# Patient Record
Sex: MALE | Race: WHITE | NOT HISPANIC OR LATINO | Employment: PART TIME | URBAN - METROPOLITAN AREA
[De-identification: names, ages, dates, MRNs, and addresses within clinical notes are randomized per-mention and may not be internally consistent; named-entity substitution may affect disease eponyms.]

---

## 2017-07-07 ENCOUNTER — OFFICE VISIT (OUTPATIENT)
Dept: PEDIATRICS | Facility: CLINIC | Age: 62
End: 2017-07-07
Payer: COMMERCIAL

## 2017-07-07 VITALS
HEIGHT: 66 IN | HEART RATE: 95 BPM | TEMPERATURE: 98.4 F | OXYGEN SATURATION: 95 % | DIASTOLIC BLOOD PRESSURE: 88 MMHG | BODY MASS INDEX: 33.91 KG/M2 | SYSTOLIC BLOOD PRESSURE: 136 MMHG | WEIGHT: 211 LBS

## 2017-07-07 DIAGNOSIS — M54.2 CHRONIC NECK PAIN: ICD-10-CM

## 2017-07-07 DIAGNOSIS — G89.29 CHRONIC NECK PAIN: ICD-10-CM

## 2017-07-07 DIAGNOSIS — I10 HTN, GOAL BELOW 140/90: ICD-10-CM

## 2017-07-07 DIAGNOSIS — Z00.00 ROUTINE GENERAL MEDICAL EXAMINATION AT A HEALTH CARE FACILITY: Primary | ICD-10-CM

## 2017-07-07 DIAGNOSIS — G44.049 CHRONIC PAROXYSMAL HEMICRANIA, NOT INTRACTABLE: ICD-10-CM

## 2017-07-07 DIAGNOSIS — R97.20 ELEVATED PROSTATE SPECIFIC ANTIGEN (PSA): ICD-10-CM

## 2017-07-07 LAB
BASOPHILS # BLD AUTO: 0 10E9/L (ref 0–0.2)
BASOPHILS NFR BLD AUTO: 0.3 %
DIFFERENTIAL METHOD BLD: NORMAL
EOSINOPHIL # BLD AUTO: 0.2 10E9/L (ref 0–0.7)
EOSINOPHIL NFR BLD AUTO: 2.3 %
ERYTHROCYTE [DISTWIDTH] IN BLOOD BY AUTOMATED COUNT: 12.6 % (ref 10–15)
HCT VFR BLD AUTO: 45.8 % (ref 40–53)
HGB BLD-MCNC: 15.4 G/DL (ref 13.3–17.7)
LYMPHOCYTES # BLD AUTO: 1.3 10E9/L (ref 0.8–5.3)
LYMPHOCYTES NFR BLD AUTO: 19.6 %
MCH RBC QN AUTO: 30.6 PG (ref 26.5–33)
MCHC RBC AUTO-ENTMCNC: 33.6 G/DL (ref 31.5–36.5)
MCV RBC AUTO: 91 FL (ref 78–100)
MONOCYTES # BLD AUTO: 0.6 10E9/L (ref 0–1.3)
MONOCYTES NFR BLD AUTO: 8.8 %
NEUTROPHILS # BLD AUTO: 4.7 10E9/L (ref 1.6–8.3)
NEUTROPHILS NFR BLD AUTO: 69 %
PLATELET # BLD AUTO: 209 10E9/L (ref 150–450)
RBC # BLD AUTO: 5.03 10E12/L (ref 4.4–5.9)
WBC # BLD AUTO: 6.8 10E9/L (ref 4–11)

## 2017-07-07 PROCEDURE — 99396 PREV VISIT EST AGE 40-64: CPT | Mod: 25 | Performed by: INTERNAL MEDICINE

## 2017-07-07 PROCEDURE — 80050 GENERAL HEALTH PANEL: CPT | Performed by: INTERNAL MEDICINE

## 2017-07-07 PROCEDURE — G0103 PSA SCREENING: HCPCS | Performed by: INTERNAL MEDICINE

## 2017-07-07 PROCEDURE — 90715 TDAP VACCINE 7 YRS/> IM: CPT | Performed by: INTERNAL MEDICINE

## 2017-07-07 PROCEDURE — 90471 IMMUNIZATION ADMIN: CPT | Performed by: INTERNAL MEDICINE

## 2017-07-07 PROCEDURE — 36415 COLL VENOUS BLD VENIPUNCTURE: CPT | Performed by: INTERNAL MEDICINE

## 2017-07-07 RX ORDER — TRIAMTERENE/HYDROCHLOROTHIAZID 37.5-25 MG
TABLET ORAL
Refills: 0 | COMMUNITY
Start: 2017-06-09 | End: 2017-07-10

## 2017-07-07 NOTE — NURSING NOTE
"Chief Complaint   Patient presents with     Physical       Initial /80 (BP Location: Right arm, Cuff Size: Adult Regular)  Pulse 95  Temp 98.4  F (36.9  C) (Oral)  Ht 5' 6\" (1.676 m)  Wt 211 lb (95.7 kg)  SpO2 95%  BMI 34.06 kg/m2 Estimated body mass index is 34.06 kg/(m^2) as calculated from the following:    Height as of this encounter: 5' 6\" (1.676 m).    Weight as of this encounter: 211 lb (95.7 kg).  Medication Reconciliation: complete   Anitra Tay MA    "

## 2017-07-07 NOTE — LETTER
[address] [address]                  [phone]   July 10, 2017    Roni Rebolledo  [address]  [facility] [address]      Dear Roni,     Here are the results from the recent Labs that we did.     Your prostate testing numbers have increased slightly since last year. I think we should have you discuss these numbers with a urologist to see if we should continue to follow or if any intervention is needed. You can call to schedule [facility], KENDALL Patel [phone]   [email or web address]     Your other labs were in an ok range.     Let me know if you have questions or concerns!     Sincerely,       Roge Alvarado MD   Internal Medicine and Pediatrics         Results for orders placed or performed in visit on 07/07/17   TSH with free T4 reflex   Result Value Ref Range    TSH 0.55 0.40 - 4.00 mU/L   CBC with platelets differential   Result Value Ref Range    WBC 6.8 4.0 - 11.0 10e9/L    RBC Count 5.03 4.4 - 5.9 10e12/L    Hemoglobin 15.4 13.3 - 17.7 g/dL    Hematocrit 45.8 40.0 - 53.0 %    MCV 91 78 - 100 fl    MCH 30.6 26.5 - 33.0 pg    MCHC 33.6 31.5 - 36.5 g/dL    RDW 12.6 10.0 - 15.0 %    Platelet Count 209 150 - 450 10e9/L    Diff Method Automated Method     % Neutrophils 69.0 %    % Lymphocytes 19.6 %    % Monocytes 8.8 %    % Eosinophils 2.3 %    % Basophils 0.3 %    Absolute Neutrophil 4.7 1.6 - 8.3 10e9/L    Absolute Lymphocytes 1.3 0.8 - 5.3 10e9/L    Absolute Monocytes 0.6 0.0 - 1.3 10e9/L    Absolute Eosinophils 0.2 0.0 - 0.7 10e9/L    Absolute Basophils 0.0 0.0 - 0.2 10e9/L   Comprehensive metabolic panel   Result Value Ref Range    Sodium 143 133 - 144 mmol/L    Potassium 4.1 3.4 - 5.3 mmol/L    Chloride 107 94 - 109 mmol/L    Carbon Dioxide 32 20 - 32 mmol/L    Anion Gap 4 3 - 14 mmol/L    Glucose 109 (H) 70 - 99 mg/dL    Urea Nitrogen 24 7 - 30 mg/dL    Creatinine 0.95 0.66 - 1.25 mg/dL    GFR  Estimate 80 >60 mL/min/1.7m2    GFR Estimate If Black >90   GFR Calc   >60 mL/min/1.7m2    Calcium 8.5 8.5 - 10.1 mg/dL    Bilirubin Total 0.3 0.2 - 1.3 mg/dL    Albumin 3.9 3.4 - 5.0 g/dL    Protein Total 6.8 6.8 - 8.8 g/dL    Alkaline Phosphatase 68 40 - 150 U/L    ALT 34 0 - 70 U/L    AST 22 0 - 45 U/L   PSA, screen   Result Value Ref Range    PSA 10.80 (H) 0 - 4 ug/L

## 2017-07-07 NOTE — PATIENT INSTRUCTIONS
1) Labs downstairs for routine health check including prostate testing, liver and kidney functions, thyroid testing    2) They should call you from [facility] to set up MRI of the neck and brain-     3) I would continue current blood pressure medication- let me know if you need refills.    Roge Alvarado MD    Preventive Health Recommendations  Male Ages 50   64    Yearly exam:             See your health care provider every year in order to  o   Review health changes.   o   Discuss preventive care.    o   Review your medicines if your doctor has prescribed any.     Have a cholesterol test every 5 years, or more frequently if you are at risk for high cholesterol/heart disease.     Have a diabetes test (fasting glucose) every three years. If you are at risk for diabetes, you should have this test more often.     Have a colonoscopy at age 50, or have a yearly FIT test (stool test). These exams will check for colon cancer.      Talk with your health care provider about whether or not a prostate cancer screening test (PSA) is right for you.    You should be tested each year for STDs (sexually transmitted diseases), if you re at risk.     Shots: Get a flu shot each year. Get a tetanus shot every 10 years.     Nutrition:    Eat at least 5 servings of fruits and vegetables daily.     Eat whole-grain bread, whole-wheat pasta and brown rice instead of white grains and rice.     Talk to your provider about Calcium and Vitamin D.     Lifestyle    Exercise for at least 150 minutes a week (30 minutes a day, 5 days a week). This will help you control your weight and prevent disease.     Limit alcohol to one drink per day.     No smoking.     Wear sunscreen to prevent skin cancer.     See your dentist every six months for an exam and cleaning.     See your eye doctor every 1 to 2 years.
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
## 2017-07-07 NOTE — MR AVS SNAPSHOT
After Visit Summary   7/7/2017    Roni Rebolledo    MRN: [id number]           Patient Information     Date Of Birth          1955        Visit Information        Provider Department      7/7/2017 1:50 PM Roge Alvarado MD [facility]        Today's Diagnoses     Routine general medical examination at a health care facility    -  1    Chronic paroxysmal hemicrania, not intractable        HTN, goal below 140/90        Chronic neck pain          Care Instructions    1) Labs downstairs for routine health check including prostate testing, liver and kidney functions, thyroid testing    2) They should call you from [facility] to set up MRI of the neck and brain-     3) I would continue current blood pressure medication- let me know if you need refills.    Roge Alvarado MD    Preventive Health Recommendations  Male Ages 50 - 64    Yearly exam:             See your health care provider every year in order to  o   Review health changes.   o   Discuss preventive care.    o   Review your medicines if your doctor has prescribed any.     Have a cholesterol test every 5 years, or more frequently if you are at risk for high cholesterol/heart disease.     Have a diabetes test (fasting glucose) every three years. If you are at risk for diabetes, you should have this test more often.     Have a colonoscopy at age 50, or have a yearly FIT test (stool test). These exams will check for colon cancer.      Talk with your health care provider about whether or not a prostate cancer screening test (PSA) is right for you.    You should be tested each year for STDs (sexually transmitted diseases), if you re at risk.     Shots: Get a flu shot each year. Get a tetanus shot every 10 years.     Nutrition:    Eat at least 5 servings of fruits and vegetables daily.     Eat whole-grain bread, whole-wheat pasta and brown rice instead of white grains and rice.     Talk to your provider about Calcium and Vitamin D.  "    Lifestyle    Exercise for at least 150 minutes a week (30 minutes a day, 5 days a week). This will help you control your weight and prevent disease.     Limit alcohol to one drink per day.     No smoking.     Wear sunscreen to prevent skin cancer.     See your dentist every six months for an exam and cleaning.     See your eye doctor every 1 to 2 years.            Follow-ups after your visit        Future tests that were ordered for you today     Open Future Orders        Priority Expected Expires Ordered    MR Brain w/o Contrast Routine  7/7/2018 7/7/2017    MR Cervical Spine w/o Contrast Routine  7/7/2018 7/7/2017            Who to contact     If you have questions or need follow up information about today's clinic visit or your schedule please contact [facility] directly at [phone].  Normal or non-critical lab and imaging results will be communicated to you by MyChart, letter or phone within 4 business days after the clinic has received the results. If you do not hear from us within 7 days, please contact the clinic through [employer] or phone. If you have a critical or abnormal lab result, we will notify you by phone as soon as possible.  Submit refill requests through [employer] or call your pharmacy and they will forward the refill request to us. Please allow 3 business days for your refill to be completed.          Additional Information About Your Visit        [employer] Information     [employer] lets you send messages to your doctor, view your test results, renew your prescriptions, schedule appointments and more. To sign up, go to [email or web address] . Click on \"Log in\" on the left side of the screen, which will take you to the Welcome page. Then click on \"Sign up Now\" on the right side of the page.     You will be asked to enter the access code listed below, as well as some personal information. Please follow the directions to create your username and password.     Your access code is: " "[email or web address]  Expires: 10/5/2017  2:48 PM     Your access code will  in 90 days. If you need help or a new code, please call your [place] clinic or [phone].        Care EveryWhere ID     This is your Care EveryWhere ID. This could be used by other organizations to access your [place] medical records  [id number]        Your Vitals Were     Pulse Temperature Height Pulse Oximetry BMI (Body Mass Index)       95 98.4  F (36.9  C) (Oral) 5' 6\" (1.676 m) 95% 34.06 kg/m2        Blood Pressure from Last 3 Encounters:   17 136/88   16 134/88   05/29/15 125/78    Weight from Last 3 Encounters:   17 211 lb (95.7 kg)   16 217 lb (98.4 kg)   05/06/15 206 lb (93.4 kg)              We Performed the Following     CBC with platelets differential     Comprehensive metabolic panel     PSA, screen     TSH with free T4 reflex        Primary Care Provider Office Phone # Fax #    Roge Alvarado [phone]       [facility] [address] [place] [address]        Equal Access to Services     GORDON TO : Hadii sherwin ku hadasho Soomaali, waaxda luqadaha, qaybta kaalmada adeegyada, rashmi terryn luis pinon . So [facility] [phone].    ATENCIÓN: Si habla español, tiene a hernandez disposición servicios gratuitos de asistencia lingüística. Llame al [phone].    We comply with applicable federal civil rights laws and [place] laws. We do not discriminate on the basis of race, color, national origin, age, disability sex, sexual orientation or gender identity.            Thank you!     Thank you for choosing [facility]  for your care. Our goal is always to provide you with excellent care. Hearing back from our patients is one way we can continue to improve our services. Please take a few minutes to complete the written survey that you may receive in the mail after your visit with us. Thank you!             Your Updated Medication List - " Protect others around you: Learn how to safely use, store and throw away your medicines at www.disposemymeds.org.          This list is accurate as of: 7/7/17  2:48 PM.  Always use your most recent med list.                   Brand Name Dispense Instructions for use Diagnosis    triamterene-hydrochlorothiazide 37.5-25 MG per tablet    MAXZIDE-25     TK 1 T PO QAM

## 2017-07-08 LAB
ALBUMIN SERPL-MCNC: 3.9 G/DL (ref 3.4–5)
ALP SERPL-CCNC: 68 U/L (ref 40–150)
ALT SERPL W P-5'-P-CCNC: 34 U/L (ref 0–70)
ANION GAP SERPL CALCULATED.3IONS-SCNC: 4 MMOL/L (ref 3–14)
AST SERPL W P-5'-P-CCNC: 22 U/L (ref 0–45)
BILIRUB SERPL-MCNC: 0.3 MG/DL (ref 0.2–1.3)
BUN SERPL-MCNC: 24 MG/DL (ref 7–30)
CALCIUM SERPL-MCNC: 8.5 MG/DL (ref 8.5–10.1)
CHLORIDE SERPL-SCNC: 107 MMOL/L (ref 94–109)
CO2 SERPL-SCNC: 32 MMOL/L (ref 20–32)
CREAT SERPL-MCNC: 0.95 MG/DL (ref 0.66–1.25)
GFR SERPL CREATININE-BSD FRML MDRD: 80 ML/MIN/1.7M2
GLUCOSE SERPL-MCNC: 109 MG/DL (ref 70–99)
POTASSIUM SERPL-SCNC: 4.1 MMOL/L (ref 3.4–5.3)
PROT SERPL-MCNC: 6.8 G/DL (ref 6.8–8.8)
PSA SERPL-ACNC: 10.8 UG/L (ref 0–4)
SODIUM SERPL-SCNC: 143 MMOL/L (ref 133–144)
TSH SERPL DL<=0.005 MIU/L-ACNC: 0.55 MU/L (ref 0.4–4)

## 2017-07-10 DIAGNOSIS — I10 HTN, GOAL BELOW 140/90: Primary | ICD-10-CM

## 2017-07-10 NOTE — TELEPHONE ENCOUNTER
Maxide 37.5-25mg  Last Written Prescription Date: 6/9/17  Last Fill Quantity: 0, # refills: 0  Last Office Visit with [facility] or [facility] prescribing provider: 7/7/17       Potassium   Date Value Ref Range Status   07/07/2017 4.1 3.4 - 5.3 mmol/L Final     Creatinine   Date Value Ref Range Status   07/07/2017 0.95 0.66 - 1.25 mg/dL Final     BP Readings from Last 3 Encounters:   07/07/17 136/88   05/17/16 134/88   05/29/15 125/78     *reported as historical*    Alyssa Sylvester,   [facility]

## 2017-07-13 NOTE — TELEPHONE ENCOUNTER
Patient calling to check on refill status. He spoke with someone a couple of days ago, a nurse, and was told she would be able to send in his refill for him. He still doesn't have it. Needs it to be sent to [facility] pharmacy on [address]. Please call to confirm once this has been completed.    Sally SUTTON  Central Scheduler

## 2017-07-14 ENCOUNTER — HOSPITAL ENCOUNTER (OUTPATIENT)
Dept: MRI IMAGING | Facility: CLINIC | Age: 62
End: 2017-07-14
Attending: INTERNAL MEDICINE
Payer: COMMERCIAL

## 2017-07-14 ENCOUNTER — HOSPITAL ENCOUNTER (OUTPATIENT)
Dept: MRI IMAGING | Facility: CLINIC | Age: 62
Discharge: HOME | End: 2017-07-14
Attending: INTERNAL MEDICINE | Admitting: INTERNAL MEDICINE
Payer: COMMERCIAL

## 2017-07-14 DIAGNOSIS — M54.2 CHRONIC NECK PAIN: ICD-10-CM

## 2017-07-14 DIAGNOSIS — G89.29 CHRONIC NECK PAIN: ICD-10-CM

## 2017-07-14 PROCEDURE — 70551 MRI BRAIN STEM W/O DYE: CPT

## 2017-07-14 PROCEDURE — 72141 MRI NECK SPINE W/O DYE: CPT

## 2017-07-14 RX ORDER — TRIAMTERENE/HYDROCHLOROTHIAZID 37.5-25 MG
TABLET ORAL
Qty: 60 TABLET | Refills: 0 | Status: SHIPPED | OUTPATIENT
Start: 2017-07-14 | End: 2017-07-14

## 2017-07-14 RX ORDER — TRIAMTERENE/HYDROCHLOROTHIAZID 37.5-25 MG
TABLET ORAL
Qty: 60 TABLET | Refills: 1 | Status: SHIPPED | OUTPATIENT
Start: 2017-07-14 | End: 2018-01-15

## 2017-07-14 NOTE — TELEPHONE ENCOUNTER
Routing refill request to provider for review/approval because:  Medication is reported/historical    Grace Wallace RN  Message handled by Nurse Triage.

## 2017-08-29 ENCOUNTER — TRANSFERRED RECORDS (OUTPATIENT)
Dept: HEALTH INFORMATION MANAGEMENT | Facility: CLINIC | Age: 62
End: 2017-08-29

## 2018-01-15 DIAGNOSIS — I10 HTN, GOAL BELOW 140/90: ICD-10-CM

## 2018-01-15 NOTE — TELEPHONE ENCOUNTER
"Requested Prescriptions   Pending Prescriptions Disp Refills     triamterene-hydrochlorothiazide (MAXZIDE-25) 37.5-25 MG per tablet  Last Written Prescription Date:  7/14/17  Last Fill Quantity: 60,  # refills: 1   Last Office Visit with [facility], [facility] or [facility] prescribing provider:  7/7/2017     Future Office Visit:      60 tablet 1     Sig: TK 1 T PO QAM    Diuretics (Including Combos) Protocol Passed    1/15/2018 11:28 AM       Passed - Blood pressure under 140/90    BP Readings from Last 3 Encounters:   07/07/17 136/88   05/17/16 134/88   05/29/15 125/78                Passed - Recent or future visit with authorizing provider's specialty    Patient had office visit in the last year or has a visit in the next 30 days with authorizing provider.  See \"Patient Info\" tab in inbasket, or \"Choose Columns\" in Meds & Orders section of the refill encounter.              Passed - Patient is age 18 or older       Passed - Normal serum creatinine on file in past 12 months    Recent Labs   Lab Test  07/07/17   1459   CR  0.95             Passed - Normal serum potassium on file in past 12 months    Recent Labs   Lab Test  07/07/17   1459   POTASSIUM  4.1                   Passed - Normal serum sodium on file in past 12 months    Recent Labs   Lab Test  07/07/17   1459   NA  143                "

## 2018-01-17 RX ORDER — TRIAMTERENE/HYDROCHLOROTHIAZID 37.5-25 MG
1 TABLET ORAL EVERY MORNING
Qty: 90 TABLET | Refills: 1 | Status: SHIPPED | OUTPATIENT
Start: 2018-01-17 | End: 2021-05-10

## 2018-01-17 NOTE — TELEPHONE ENCOUNTER
Prescription approved per [facility] [facility].  Ella Coulter RN

## 2018-01-18 ENCOUNTER — OFFICE VISIT (OUTPATIENT)
Dept: URGENT CARE | Facility: URGENT CARE | Age: 63
End: 2018-01-18
Payer: COMMERCIAL

## 2018-01-18 ENCOUNTER — TELEPHONE (OUTPATIENT)
Dept: PEDIATRICS | Facility: CLINIC | Age: 63
End: 2018-01-18

## 2018-01-18 VITALS
TEMPERATURE: 97.7 F | HEART RATE: 99 BPM | BODY MASS INDEX: 34.06 KG/M2 | DIASTOLIC BLOOD PRESSURE: 86 MMHG | SYSTOLIC BLOOD PRESSURE: 134 MMHG | OXYGEN SATURATION: 99 % | WEIGHT: 211 LBS

## 2018-01-18 DIAGNOSIS — I10 HTN, GOAL BELOW 140/90: ICD-10-CM

## 2018-01-18 DIAGNOSIS — R06.02 SOB (SHORTNESS OF BREATH): ICD-10-CM

## 2018-01-18 DIAGNOSIS — I71.21 ASCENDING AORTIC ANEURYSM (H): ICD-10-CM

## 2018-01-18 DIAGNOSIS — R07.9 ACUTE CHEST PAIN: Primary | ICD-10-CM

## 2018-01-18 PROCEDURE — 93000 ELECTROCARDIOGRAM COMPLETE: CPT | Performed by: PHYSICIAN ASSISTANT

## 2018-01-18 PROCEDURE — 99215 OFFICE O/P EST HI 40 MIN: CPT | Performed by: PHYSICIAN ASSISTANT

## 2018-01-18 ASSESSMENT — ENCOUNTER SYMPTOMS
ABDOMINAL PAIN: 0
FEVER: 0
CHILLS: 0
NAUSEA: 0
SHORTNESS OF BREATH: 1
HEADACHES: 0
FOCAL WEAKNESS: 0
VOMITING: 0
DIARRHEA: 0

## 2018-01-18 NOTE — MR AVS SNAPSHOT
"              After Visit Summary   2018    Roni Rebolledo    MRN: [id number]           Patient Information     Date Of Birth          1955        Visit Information        Provider Department      2018 3:15 PM Shital Ramon PA-C [facility]        Today's Diagnoses     Acute chest pain    -  1    SOB (shortness of breath)        Ascending aortic aneurysm (H)        HTN, goal below 140/90           Follow-ups after your visit        Who to contact     If you have questions or need follow up information about today's clinic visit or your schedule please contact [facility] directly at [phone].  Normal or non-critical lab and imaging results will be communicated to you by Animalvitaehart, letter or phone within 4 business days after the clinic has received the results. If you do not hear from us within 7 days, please contact the clinic through Animalvitaehart or phone. If you have a critical or abnormal lab result, we will notify you by phone as soon as possible.  Submit refill requests through Wandrian or call your pharmacy and they will forward the refill request to us. Please allow 3 business days for your refill to be completed.          Additional Information About Your Visit        MyChart Information     Wandrian lets you send messages to your doctor, view your test results, renew your prescriptions, schedule appointments and more. To sign up, go to [email or web address] . Click on \"Log in\" on the left side of the screen, which will take you to the Welcome page. Then click on \"Sign up Now\" on the right side of the page.     You will be asked to enter the access code listed below, as well as some personal information. Please follow the directions to create your username and password.     Your access code is: [email or web address]  Expires: 2018  4:11 PM     Your access code will  in 90 days. If you need help or a new code, please call your [facility] clinic or " [phone].        Care EveryWhere ID     This is your Care EveryWhere ID. This could be used by other organizations to access your [place] medical records  [id number]        Your Vitals Were     Pulse Temperature Pulse Oximetry BMI (Body Mass Index)          99 97.7  F (36.5  C) (Tympanic) 99% 34.06 kg/m2         Blood Pressure from Last 3 Encounters:   01/18/18 134/86   07/07/17 136/88   05/17/16 134/88    Weight from Last 3 Encounters:   01/18/18 211 lb (95.7 kg)   07/07/17 211 lb (95.7 kg)   05/17/16 217 lb (98.4 kg)              We Performed the Following     EKG 12-lead complete w/read - Clinics        Primary Care Provider Office Phone # Fax #    Roge Alvarado [phone] [address] [place] [address]        Equal Access to Services     [facility]: Hadii aad ku hadasho Soomaali, waaxda luqadaha, qaybta kaalmada adeegyada, rashmi thrasherin hoda pinon . So [facility] [phone].    ATENCIÓN: Si habla español, tiene a hernandez disposición servicios gratuitos de asistencia lingüística. Llame al [phone].    We comply with applicable federal civil rights laws and [place] laws. We do not discriminate on the basis of race, color, national origin, age, disability, sex, sexual orientation, or gender identity.            Thank you!     Thank you for choosing [facility]  for your care. Our goal is always to provide you with excellent care. Hearing back from our patients is one way we can continue to improve our services. Please take a few minutes to complete the written survey that you may receive in the mail after your visit with us. Thank you!             Your Updated Medication List - Protect others around you: Learn how to safely use, store and throw away your medicines at www.disposemymeds.org.          This list is accurate as of: 1/18/18  4:11 PM.  Always use your most recent med list.                   Brand Name Dispense Instructions  for use Diagnosis    triamterene-hydrochlorothiazide 37.5-25 MG per tablet    MAXZIDE-25    90 tablet    Take 1 tablet by mouth every morning    HTN, goal below 140/90

## 2018-01-18 NOTE — TELEPHONE ENCOUNTER
Pt notifies the following:     - pain under the L armpit off & on for months  - also has been experiencing tightness in mid chest, doesn't radiate  - has been trying to get into shape, working out regularly, lifting weights  - wondering whether the chest tightness is a muscle pull, has herniated disks in neck  - denies chest pain, GOTTI, HA, SOB, vision changes, weakness in one side of face/body, diaphoresis, lethargy, vomiting, nausea, confusion, edema, pain radiating to jaw/shoulder/hand or dehydration sx's  - is taking maxzide qd  - had multiple stress tests in the last 10 yrs      Offered an appointment tomorrow, advised ER if sx's get worse. Pt doesn't want to go to ER, don't want to wait till tomorrow to be seen. So, advised UC. Pt agrees.     Bello, RN  Triage Nurse

## 2018-01-18 NOTE — PROGRESS NOTES
HPI  January 18, 2018  HPI limited as pt is poor historian.    HPI: Roni Rebolledo is a 62 year old male with hx HTN, ascending aortic aneurysm, & anxiety who complains of moderate intermittent midsternal chest pain for the past couple of months. He notes pain today intermittently (none currently) and feeling SOB at rest today. Chest pain feels like a tightness and like heartburn, does not radiate. Denies pain between shoulderblades. No known aggravating or alleviating factors. No treatments tried. Denies fever/chills, cough, HA, abd pain, N/V/D, rash, or any other symptoms.     Aortic aneurysm diagnosed in 2013 and monitored periodically; last imaging done in 2016. Pt has had several stress tests which have been normal- can't recall when last one was.    Past Medical History:   Diagnosis Date     Anxiety 12/23/2009     Ascending aortic aneurysm (H) 5/6/2015     Bunion, left 5/17/2016     Bunion, right 5/17/2016     CARDIOVASCULAR SCREENING; LDL GOAL LESS THAN 130 10/31/2010     Carpal tunnel syndrome of left wrist 5/17/2016     Carpal tunnel syndrome on both sides      Cervical radiculopathy 5/17/2016     Elevated prostate specific antigen (PSA) 5/6/2015     Flank pain 5/6/2015     HTN, goal below 140/90 12/23/2013     Immunization deficiency 5/6/2015     Lumbar radiculopathy 5/17/2016     Onychomycosis 5/17/2016     Overweight 5/17/2016     Urinary retention      Past Surgical History:   Procedure Laterality Date     ARTHROSCOPY KNEE WITH LATERAL MENISCECTOMY      left     ARTHROSCOPY SHOULDER      bilat     COLONOSCOPY  5/29/2015    Dr. Vigil [facility]     COLONOSCOPY N/A 5/29/2015    Procedure: COLONOSCOPY;  Surgeon: Mariano Vigil MD;  Location:  GI     ORTHOPEDIC SURGERY  2013    ACL repair left     PROSTATE BIOPSY, NEEDLE, SATURATION SAMPLING       Social History   Substance Use Topics     Smoking status: Former Smoker     Quit date: 1/3/1987     Smokeless tobacco: Never Used     Alcohol use 0.0 oz/week      0 Standard drinks or equivalent per week      Comment: wine 5 times a wk --glass or 2     Patient Active Problem List   Diagnosis     Anxiety     CARDIOVASCULAR SCREENING; LDL GOAL LESS THAN 130     HTN, goal below 140/90     Ascending aortic aneurysm (H)     Elevated prostate specific antigen (PSA)     Flank pain     Immunization deficiency     Overweight     Cervical radiculopathy     Lumbar radiculopathy     Carpal tunnel syndrome of left wrist     Bunion, left     Bunion, right     Onychomycosis     Family History   Problem Relation Age of Onset     Other Cancer Mother      brain CA     MENTAL ILLNESS Father      dementia     Coronary Artery Disease Brother      Colon Cancer No family hx of         Problem list, Medication list, Allergies, and Medical/Social/Surgical histories reviewed in [facility] and updated as appropriate.      Review of Systems   Constitutional: Negative for chills and fever.   Respiratory: Positive for shortness of breath.    Cardiovascular: Positive for chest pain.   Gastrointestinal: Negative for abdominal pain, diarrhea, nausea and vomiting.   Skin: Negative for rash.   Neurological: Negative for focal weakness and headaches.   All other systems reviewed and are negative.        Physical Exam   Constitutional: He is oriented to person, place, and time and well-developed, well-nourished, and in no distress.   HENT:   Head: Normocephalic and atraumatic.   Neck: Trachea normal. No JVD present. No tracheal deviation present.   Cardiovascular: Normal rate, regular rhythm and normal heart sounds.    Pulmonary/Chest: Effort normal and breath sounds normal.   Musculoskeletal: Normal range of motion.   Neurological: He is alert and oriented to person, place, and time. Gait normal.   Skin: Skin is warm and dry.   Psychiatric: His mood appears anxious.   Nursing note and vitals reviewed.      Vital Signs  /86 (BP Location: Right arm, Patient Position: Chair, Cuff Size: Adult Large)  Pulse 99   Temp 97.7  F (36.5  C) (Tympanic)  Wt 211 lb (95.7 kg)  SpO2 99%  BMI 34.06 kg/m2     Diagnostic Test Results:  none   EKG - appears normal, NSR, normal axis, normal intervals, no acute ST/T changes c/w ischemia, no LVH by voltage criteria, unchanged from previous tracings    ASSESSMENT/PLAN      ICD-10-CM    1. Acute chest pain R07.9 EKG 12-lead complete w/read - Clinics   2. SOB (shortness of breath) R06.02    3. Ascending aortic aneurysm (H) I71.2    4. HTN, goal below 140/90 I10       RRR, no M/R/G, lungs CTAB, EKG normal. Vitals WNL. Pt with hx aortic aneurysm and HTN, presents with SOB and intermittent chest pain. Although he does appear anxious and symptoms may be due to anxiety, I informed him I cannot rule out ACS or ruptured aortic aneurysm here from urgent care so have recommended he go to the ER. He will go to [facility] ER by private car- declines ambulance.    I have discussed any lab or imaging results, the patient's diagnosis, and my plan of treatment with the patient and/or family. Patient is aware to come back in if with worsening symptoms or if no relief despite treatment plan.  Patient voiced understanding and had no further questions.       Follow Up: Data Unavailable    GURINDER Gayle, PAERIK  [facility]

## 2018-01-18 NOTE — NURSING NOTE
"Chief Complaint   Patient presents with     Urgent Care     Chest Pain     Pt states has had chest tightness, sob, and headache .        Initial /86 (BP Location: Right arm, Patient Position: Chair, Cuff Size: Adult Large)  Pulse 99  Temp 97.7  F (36.5  C) (Tympanic)  Wt 211 lb (95.7 kg)  SpO2 99%  BMI 34.06 kg/m2 Estimated body mass index is 34.06 kg/(m^2) as calculated from the following:    Height as of 7/7/17: 5' 6\" (1.676 m).    Weight as of this encounter: 211 lb (95.7 kg).  Medication Reconciliation: unable or not appropriate to perform   Chana Ross CMA ([facility]) 1/18/2018 3:27 PM    "

## 2018-01-24 ENCOUNTER — OFFICE VISIT (OUTPATIENT)
Dept: URGENT CARE | Facility: URGENT CARE | Age: 63
End: 2018-01-24
Payer: COMMERCIAL

## 2018-01-24 VITALS
SYSTOLIC BLOOD PRESSURE: 128 MMHG | HEART RATE: 93 BPM | DIASTOLIC BLOOD PRESSURE: 80 MMHG | TEMPERATURE: 99.9 F | WEIGHT: 211 LBS | BODY MASS INDEX: 34.06 KG/M2 | OXYGEN SATURATION: 96 %

## 2018-01-24 DIAGNOSIS — R05.9 COUGH: ICD-10-CM

## 2018-01-24 DIAGNOSIS — J10.1 INFLUENZA B: Primary | ICD-10-CM

## 2018-01-24 DIAGNOSIS — R52 BODY ACHES: ICD-10-CM

## 2018-01-24 DIAGNOSIS — R07.0 THROAT PAIN: ICD-10-CM

## 2018-01-24 LAB
DEPRECATED S PYO AG THROAT QL EIA: NORMAL
FLUAV+FLUBV AG SPEC QL: NEGATIVE
FLUAV+FLUBV AG SPEC QL: POSITIVE
SPECIMEN SOURCE: ABNORMAL
SPECIMEN SOURCE: NORMAL

## 2018-01-24 PROCEDURE — 99214 OFFICE O/P EST MOD 30 MIN: CPT | Performed by: FAMILY MEDICINE

## 2018-01-24 PROCEDURE — 87880 STREP A ASSAY W/OPTIC: CPT | Performed by: FAMILY MEDICINE

## 2018-01-24 PROCEDURE — 87081 CULTURE SCREEN ONLY: CPT | Performed by: FAMILY MEDICINE

## 2018-01-24 PROCEDURE — 87804 INFLUENZA ASSAY W/OPTIC: CPT | Performed by: FAMILY MEDICINE

## 2018-01-24 RX ORDER — OSELTAMIVIR PHOSPHATE 75 MG/1
75 CAPSULE ORAL 2 TIMES DAILY
Qty: 10 CAPSULE | Refills: 0 | Status: SHIPPED | OUTPATIENT
Start: 2018-01-24 | End: 2018-01-29

## 2018-01-24 RX ORDER — BENZONATATE 200 MG/1
200 CAPSULE ORAL 3 TIMES DAILY PRN
Qty: 21 CAPSULE | Refills: 0 | Status: SHIPPED | OUTPATIENT
Start: 2018-01-24 | End: 2020-01-16

## 2018-01-24 NOTE — PATIENT INSTRUCTIONS
Take full course of Tamiflu for influenza B.  Okay for tylenol and ibuprofen for discomfort.  Okay to take tessalon perles to help with cough.      Influenza (Adult)    Influenza is also called the flu. It is a viral illness that affects the air passages of your lungs. It is different from the common cold. The flu can easily be passed from one to person to another. It may be spread through the air by coughing and sneezing. Or it can be spread by touching the sick person and then touching your own eyes, nose, or mouth.  The flu starts 1 to 3 days after you are exposed to the flu virus. It may last for 1 to 2 weeks but many people feel tired or fatigued for many weeks afterward. You usually don t need to take antibiotics unless you have a complication. This might be an ear or sinus infection or pneumonia.  Symptoms of the flu may be mild or severe. They can include extreme tiredness (wanting to stay in bed all day), chills, fevers, muscle aches, soreness with eye movement, headache, and a dry, hacking cough.  Home care  Follow these guidelines when caring for yourself at home:    Avoid being around cigarette smoke, whether yours or other people s.    Acetaminophen or ibuprofen will help ease your fever, muscle aches, and headache. Don t give aspirin to anyone younger than 18 who has the flu. Aspirin can harm the liver.    Nausea and loss of appetite are common with the flu. Eat light meals. Drink 6 to 8 glasses of liquids every day. Good choices are water, sport drinks, soft drinks without caffeine, juices, tea, and soup. Extra fluids will also help loosen secretions in your nose and lungs.    Over-the-counter cold medicines will not make the flu go away faster. But the medicines may help with coughing, sore throat, and congestion in your nose and sinuses. Don t use a decongestant if you have high blood pressure.    Stay home until your fever has been gone for at least 24 hours without using medicine to reduce  fever.  Follow-up care  Follow up with your healthcare provider, or as advised, if you are not getting better over the next week.  If you are age 65 or older, talk with your provider about getting a pneumococcal vaccine every 5 years. You should also get this vaccine if you have chronic asthma or COPD. All adults should get a flu vaccine every fall. Ask your provider about this.  When to seek medical advice  Call your healthcare provider right away if any of these occur:    Cough with lots of colored mucus (sputum) or blood in your mucus    Chest pain, shortness of breath, wheezing, or trouble breathing    Severe headache, or face, neck, or ear pain    New rash with fever    Fever of 100.4 F (38 C) or higher, or as directed by your healthcare provider    Confusion, behavior change, or seizure    Severe weakness or dizziness    You get a new fever or cough after getting better for a few days  Date Last Reviewed: 1/1/2017 2000-2017 [employer]. [address] [place] [address]. All rights reserved. This information is not intended as a substitute for professional medical care. Always follow your healthcare professional's instructions.

## 2018-01-24 NOTE — PROGRESS NOTES
SUBJECTIVE:   Roni Rebolledo is a 62 year old male presenting with a chief complaint of bodyaches, sore throat, cough and chills.  Developed fever last night.  Denies any N/V/D, states that digestive has slowed down and constipated, took laxative and was able to have BM.  Does not obtain flu vaccination  Onset of symptoms was 3 day(s) ago.  Course of illness is worsening.    Severity moderate  Current and Associated symptoms: fever, cough, body aches  Treatment measures tried include Tylenol/Ibuprofen, Fluids and Rest.  Predisposing factors include ill contact: work and family.    Patient is planning to go to [place] on Friday, will be gone for 3 weeks.  No history of asthma.  Patient has seasonal allergies.  Prior TOB use.    Past Medical History:   Diagnosis Date     Anxiety 12/23/2009     Ascending aortic aneurysm (H) 5/6/2015     Bunion, left 5/17/2016     Bunion, right 5/17/2016     CARDIOVASCULAR SCREENING; LDL GOAL LESS THAN 130 10/31/2010     Carpal tunnel syndrome of left wrist 5/17/2016     Carpal tunnel syndrome on both sides      Cervical radiculopathy 5/17/2016     Elevated prostate specific antigen (PSA) 5/6/2015     Flank pain 5/6/2015     HTN, goal below 140/90 12/23/2013     Immunization deficiency 5/6/2015     Lumbar radiculopathy 5/17/2016     Onychomycosis 5/17/2016     Overweight 5/17/2016     Urinary retention      Current Outpatient Prescriptions   Medication Sig Dispense Refill     oseltamivir (TAMIFLU) 75 MG capsule Take 1 capsule (75 mg) by mouth 2 times daily for 5 days 10 capsule 0     benzonatate (TESSALON) 200 MG capsule Take 1 capsule (200 mg) by mouth 3 times daily as needed for cough 21 capsule 0     triamterene-hydrochlorothiazide (MAXZIDE-25) 37.5-25 MG per tablet Take 1 tablet by mouth every morning 90 tablet 1     Social History   Substance Use Topics     Smoking status: Former Smoker     Quit date: 1/3/1987     Smokeless tobacco: Never Used     Alcohol use 0.0 oz/week     0  Standard drinks or equivalent per week      Comment: wine 5 times a wk --glass or 2       ROS:  CONSTITUTIONAL:POSITIVE  for chills, fatigue, fever, malaise and myalgias  INTEGUMENTARY/SKIN: NEGATIVE for worrisome rashes, moles or lesions  ENT/MOUTH: POSITIVE for sore throat  RESP:POSITIVE for cough-non productive, cough-productive and wheezing  CV: NEGATIVE for chest pain, palpitations or peripheral edema  GI: NEGATIVE for nausea, abdominal pain, heartburn, or change in bowel habits and POSITIVE for constipation  MUSCULOSKELETAL: POSITIVE  for arthralgias     OBJECTIVE:  /80 (BP Location: Right arm, Patient Position: Chair, Cuff Size: Adult Regular)  Pulse 93  Temp 99.9  F (37.7  C) (Oral)  Wt 211 lb (95.7 kg)  SpO2 96%  BMI 34.06 kg/m2  GENERAL APPEARANCE: healthy, alert and no distress  EYES: EOMI,  PERRL, conjunctiva clear  HENT: ear canals and TM's normal.  Nose and mouth without ulcers, erythema or lesions.  No sinus tenderness.  Pharynx pink, no exudates  NECK: supple, nontender, no lymphadenopathy  RESP: lungs clear to auscultation - no rales, rhonchi or wheezes  CV: regular rates and rhythm, normal S1 S2, no murmur noted  NEURO: Normal strength and tone, sensory exam grossly normal,  normal speech and mentation  SKIN: no suspicious lesions or rashes    Results for orders placed or performed in visit on 01/24/18   Influenza A/B antigen   Result Value Ref Range    Influenza A/B Agn Specimen Nasal     Influenza A Negative NEG^Negative    Influenza B Positive (A) NEG^Negative   Rapid strep screen   Result Value Ref Range    Specimen Description Throat     Rapid Strep A Screen       NEGATIVE: No Group A streptococcal antigen detected by immunoassay, await culture report.       ASSESSMENT/PLAN:  (J10.1) Influenza B  (primary encounter diagnosis)  Plan: oseltamivir (TAMIFLU) 75 MG capsule            (R52) Body aches  Comment: influenza B  Plan: Influenza A/B antigen            (R07.0) Throat pain  Plan:  Rapid strep screen            (R05) Cough  Comment: influenza B  Plan: benzonatate (TESSALON) 200 MG capsule            Discussed symptomatic treatment, plenty of fluids and rest.  RX Tamiflu given for patient, reviewed that most effective treatment is within 48 hour of symptoms onset and unclear if patient is outside that window time but had only developed fever yesterday.  Due to age and co-morbid medical problems and planning to fly, may be of more benefit to treat with Tamiflu.    Will follow up on throat culture and treat if positive for strep.    Return to clinic if no resolution of symptoms.    Emir Enriquez MD  January 24, 2018 3:59 PM

## 2018-01-24 NOTE — MR AVS SNAPSHOT
After Visit Summary   1/24/2018    Roni Rebolledo    MRN: [id number]           Patient Information     Date Of Birth          1955        Visit Information        Provider Department      1/24/2018 2:00 PM Emir Enriquez MD [facility]        Today's Diagnoses     Influenza B    -  1    Body aches        Throat pain        Cough          Care Instructions    Take full course of Tamiflu for influenza B.  Okay for tylenol and ibuprofen for discomfort.  Okay to take tessalon perles to help with cough.      Influenza (Adult)    Influenza is also called the flu. It is a viral illness that affects the air passages of your lungs. It is different from the common cold. The flu can easily be passed from one to person to another. It may be spread through the air by coughing and sneezing. Or it can be spread by touching the sick person and then touching your own eyes, nose, or mouth.  The flu starts 1 to 3 days after you are exposed to the flu virus. It may last for 1 to 2 weeks but many people feel tired or fatigued for many weeks afterward. You usually don t need to take antibiotics unless you have a complication. This might be an ear or sinus infection or pneumonia.  Symptoms of the flu may be mild or severe. They can include extreme tiredness (wanting to stay in bed all day), chills, fevers, muscle aches, soreness with eye movement, headache, and a dry, hacking cough.  Home care  Follow these guidelines when caring for yourself at home:    Avoid being around cigarette smoke, whether yours or other people s.    Acetaminophen or ibuprofen will help ease your fever, muscle aches, and headache. Don t give aspirin to anyone younger than 18 who has the flu. Aspirin can harm the liver.    Nausea and loss of appetite are common with the flu. Eat light meals. Drink 6 to 8 glasses of liquids every day. Good choices are water, sport drinks, soft drinks without caffeine, juices, tea, and soup. Extra  fluids will also help loosen secretions in your nose and lungs.    Over-the-counter cold medicines will not make the flu go away faster. But the medicines may help with coughing, sore throat, and congestion in your nose and sinuses. Don t use a decongestant if you have high blood pressure.    Stay home until your fever has been gone for at least 24 hours without using medicine to reduce fever.  Follow-up care  Follow up with your healthcare provider, or as advised, if you are not getting better over the next week.  If you are age 65 or older, talk with your provider about getting a pneumococcal vaccine every 5 years. You should also get this vaccine if you have chronic asthma or COPD. All adults should get a flu vaccine every fall. Ask your provider about this.  When to seek medical advice  Call your healthcare provider right away if any of these occur:    Cough with lots of colored mucus (sputum) or blood in your mucus    Chest pain, shortness of breath, wheezing, or trouble breathing    Severe headache, or face, neck, or ear pain    New rash with fever    Fever of 100.4 F (38 C) or higher, or as directed by your healthcare provider    Confusion, behavior change, or seizure    Severe weakness or dizziness    You get a new fever or cough after getting better for a few days  Date Last Reviewed: 1/1/2017 2000-2017 [employer]. [address] [place] [address]. All rights reserved. This information is not intended as a substitute for professional medical care. Always follow your healthcare professional's instructions.                Follow-ups after your visit        Who to contact     If you have questions or need follow up information about today's clinic visit or your schedule please contact [facility] directly at [phone].  Normal or non-critical lab and imaging results will be communicated to you by MyChart, letter or phone within 4 business days after the clinic has  "received the results. If you do not hear from us within 7 days, please contact the clinic through LiquidText or phone. If you have a critical or abnormal lab result, we will notify you by phone as soon as possible.  Submit refill requests through LiquidText or call your pharmacy and they will forward the refill request to us. Please allow 3 business days for your refill to be completed.          Additional Information About Your Visit        LiquidText Information     Liqu[employer]Text lets you send messages to your doctor, view your test results, renew your prescriptions, schedule appointments and more. To sign up, go to [email or web address] . Click on \"Log in\" on the left side of the screen, which will take you to the Welcome page. Then click on \"Sign up Now\" on the right side of the page.     You will be asked to enter the access code listed below, as well as some personal information. Please follow the directions to create your username and password.     Your access code is: [email or web address]  Expires: 2018  4:11 PM     Your access code will  in 90 days. If you need help or a new code, please call your [place] clinic or [phone].        Care EveryWhere ID     This is your Care EveryWhere ID. This could be used by other organizations to access your [place] medical records  [id number]        Your Vitals Were     Pulse Temperature Pulse Oximetry BMI (Body Mass Index)          93 99.9  F (37.7  C) (Oral) 96% 34.06 kg/m2         Blood Pressure from Last 3 Encounters:   18 128/80   18 134/86   17 136/88    Weight from Last 3 Encounters:   18 211 lb (95.7 kg)   18 211 lb (95.7 kg)   17 211 lb (95.7 kg)              We Performed the Following     Influenza A/B antigen     Rapid strep screen          Today's Medication Changes          These changes are accurate as of 18  3:42 PM.  If you have any questions, ask your nurse or doctor.               Start taking these medicines.     "    Dose/Directions    benzonatate 200 MG capsule   Commonly known as:  TESSALON   Used for:  Cough   Started by:  Emir Enriquez MD        Dose:  200 mg   Take 1 capsule (200 mg) by mouth 3 times daily as needed for cough   Quantity:  21 capsule   Refills:  0       oseltamivir 75 MG capsule   Commonly known as:  TAMIFLU   Used for:  Influenza B   Started by:  Emir Enriquez MD        Dose:  75 mg   Take 1 capsule (75 mg) by mouth 2 times daily for 5 days   Quantity:  10 capsule   Refills:  0            Where to get your medicines      These medications were sent to [id number] - [facility] [address] AT [facility] [address]  [address], [facility] [address]     Phone:  [phone]     benzonatate 200 MG capsule    oseltamivir 75 MG capsule                Primary Care Provider Office Phone # Fax #    Roge Alvarado [phone] [address] [place] [address]        Equal Access to Services     [facility] AH: Hadii aad ku hadasho Soomaali, waaxda luqadaha, qaybta kaalmada adeegyada, waxay idiin hayaan luis pinon . So [facility] [phone].    ATENCIÓN: Si habla español, tiene a hernandez disposición servicios gratuitos de asistencia lingüística. [facility] [phone].    We comply with applicable federal civil rights laws and [place] laws. We do not discriminate on the basis of race, color, national origin, age, disability, sex, sexual orientation, or gender identity.            Thank you!     Thank you for choosing [facility]  for your care. Our goal is always to provide you with excellent care. Hearing back from our patients is one way we can continue to improve our services. Please take a few minutes to complete the written survey that you may receive in the mail after your visit with us. Thank you!             Your Updated Medication List - Protect others around you: Learn how to safely use, store and throw away your  medicines at www.disposemymeds.org.          This list is accurate as of 1/24/18  3:42 PM.  Always use your most recent med list.                   Brand Name Dispense Instructions for use Diagnosis    benzonatate 200 MG capsule    TESSALON    21 capsule    Take 1 capsule (200 mg) by mouth 3 times daily as needed for cough    Cough       oseltamivir 75 MG capsule    TAMIFLU    10 capsule    Take 1 capsule (75 mg) by mouth 2 times daily for 5 days    Influenza B       triamterene-hydrochlorothiazide 37.5-25 MG per tablet    MAXZIDE-25    90 tablet    Take 1 tablet by mouth every morning    HTN, goal below 140/90

## 2018-01-24 NOTE — NURSING NOTE
"Chief Complaint   Patient presents with     Urgent Care     Generalized Body Aches     bodyache, sore throat, cough, chills x 4        Initial /80 (BP Location: Right arm, Patient Position: Chair, Cuff Size: Adult Regular)  Pulse 93  Temp 99.9  F (37.7  C) (Oral)  Wt 211 lb (95.7 kg)  SpO2 96%  BMI 34.06 kg/m2 Estimated body mass index is 34.06 kg/(m^2) as calculated from the following:    Height as of 7/7/17: 5' 6\" (1.676 m).    Weight as of this encounter: 211 lb (95.7 kg).  Medication Reconciliation: unable or not appropriate to perform   Noam Martinez Medical Assistant    "

## 2018-01-25 LAB
BACTERIA SPEC CULT: NORMAL
SPECIMEN SOURCE: NORMAL

## 2018-12-05 ENCOUNTER — OFFICE VISIT (OUTPATIENT)
Dept: ORTHOPEDICS | Facility: CLINIC | Age: 63
End: 2018-12-05
Payer: COMMERCIAL

## 2018-12-05 VITALS — WEIGHT: 203 LBS | BODY MASS INDEX: 32.62 KG/M2 | HEIGHT: 66 IN

## 2018-12-05 DIAGNOSIS — D48.0 SYNOVIAL CHONDROMATOSIS: Primary | ICD-10-CM

## 2018-12-05 ASSESSMENT — ENCOUNTER SYMPTOMS
DYSPNEA ON EXERTION: 1
MUSCLE CRAMPS: 1
HEMATURIA: 0
STIFFNESS: 1
SINUS PAIN: 1
SORE THROAT: 0
SINUS CONGESTION: 0
WHEEZING: 1
TASTE DISTURBANCE: 0
HOARSE VOICE: 0
NAUSEA: 0
BACK PAIN: 1
BLOATING: 0
MYALGIAS: 1
CONSTIPATION: 1
SEIZURES: 0
NUMBNESS: 1
SNORES LOUDLY: 1
PALPITATIONS: 0
FLANK PAIN: 0
BLOOD IN STOOL: 0
EYE REDNESS: 0
NECK MASS: 0
EYE PAIN: 0
MUSCLE WEAKNESS: 1
COUGH DISTURBING SLEEP: 0
SHORTNESS OF BREATH: 0
TROUBLE SWALLOWING: 0
DIARRHEA: 0
PARALYSIS: 0
EYE IRRITATION: 1
LIGHT-HEADEDNESS: 0
ARTHRALGIAS: 1
TREMORS: 0
HEARTBURN: 1
DIFFICULTY URINATING: 1
DISTURBANCES IN COORDINATION: 0
DOUBLE VISION: 0
HYPOTENSION: 0
MEMORY LOSS: 1
EYE WATERING: 0
HEADACHES: 1
RECTAL PAIN: 1
COUGH: 0
HEMOPTYSIS: 0
LOSS OF CONSCIOUSNESS: 0
JOINT SWELLING: 1
SPEECH CHANGE: 0
SMELL DISTURBANCE: 0
SYNCOPE: 0
JAUNDICE: 0
LEG PAIN: 1
NECK PAIN: 1
DYSURIA: 1
POSTURAL DYSPNEA: 0
TINGLING: 1
HYPERTENSION: 1
WEAKNESS: 1
SLEEP DISTURBANCES DUE TO BREATHING: 0
BOWEL INCONTINENCE: 0
SPUTUM PRODUCTION: 0
ORTHOPNEA: 0
EXERCISE INTOLERANCE: 0
VOMITING: 0
ABDOMINAL PAIN: 0
DIZZINESS: 1

## 2018-12-05 NOTE — MR AVS SNAPSHOT
"              After Visit Summary   2018    Roni Rebolledo    MRN: [id number]           Patient Information     Date Of Birth          1955        Visit Information        Provider Department      2018 7:15 AM Saleem Johnson [facility]        Today's Diagnoses     Synovial chondromatosis    -  1       Follow-ups after your visit        Who to contact     Please call your clinic at [phone] to:    Ask questions about your health    Make or cancel appointments    Discuss your medicines    Learn about your test results    Speak to your doctor            Additional Information About Your Visit        MyChart Information     [employer] is an electronic gateway that provides easy, online access to your medical records. With [employer], you can request a clinic appointment, read your test results, renew a prescription or communicate with your care team.     To sign up for [employer] visit the website at [email or web address]   You will be asked to enter the access code listed below, as well as some personal information. Please follow the directions to create your username and password.     Your access code is: [email or web address]  Expires: 3/4/2019  6:30 AM     Your access code will  in 90 days. If you need help or a new code, please contact your [facility] or call [phone] for assistance.        Care EveryWhere ID     This is your Care EveryWhere ID. This could be used by other organizations to access your [place] medical records  [id number]        Your Vitals Were     Height BMI (Body Mass Index)                1.676 m (5' 6\") 32.77 kg/m2           Blood Pressure from Last 3 Encounters:   18 128/80   18 134/86   17 136/88    Weight from Last 3 Encounters:   18 92.1 kg (203 lb)   18 95.7 kg (211 lb)   18 95.7 kg (211 lb)              Today, you had the following     No orders found for display       " Primary Care Provider Office Phone # Fax #    Roge Alvarado [phone] [address] [place] [address]        Equal Access to Services     SHARLAGORDON JULIANKRANTHI : Hadii sherwin madison claribelo Sorohitali, waaxda luqadaha, qaybta kaalmada patricia, rashmi del toroarpan gene. So [facility] [phone].    ATENCIÓN: Si habla español, tiene a hernandez disposición servicios gratuitos de asistencia lingüística. Llame al [phone].    We comply with applicable federal civil rights laws and [place] laws. We do not discriminate on the basis of race, color, national origin, age, disability, sex, sexual orientation, or gender identity.            Thank you!     Thank you for choosing [facility]  for your care. Our goal is always to provide you with excellent care. Hearing back from our patients is one way we can continue to improve our services. Please take a few minutes to complete the written survey that you may receive in the mail after your visit with us. Thank you!             Your Updated Medication List - Protect others around you: Learn how to safely use, store and throw away your medicines at www.disposemymeds.org.          This list is accurate as of 12/5/18 10:54 AM.  Always use your most recent med list.                   Brand Name Dispense Instructions for use Diagnosis    benzonatate 200 MG capsule    TESSALON    21 capsule    Take 1 capsule (200 mg) by mouth 3 times daily as needed for cough    Cough       triamterene-HCTZ 37.5-25 MG tablet    MAXZIDE-25    90 tablet    Take 1 tablet by mouth every morning    HTN, goal below 140/90

## 2018-12-05 NOTE — LETTER
12/5/2018       RE: Roni Rebolledo  [address]  [facility] [place] [facility] [address]     Dear Colleague,    Thank you for referring your patient, Roni Rebolledo, [facility]. Please see a copy of my visit note below.    This 63-year-old male has a history of right leg pain.  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  He began to have pain in the hip and the thigh in the medial lateral aspect of the knee as well as the posterior aspect of the right knee.  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  He has minimal discomfort below the knee and does not seem to have any numbness or tingling complaints.  He has some knee pain going up and down stairs.  I reviewed his patient survey information in the EMR.    On examination he is alert oriented has a normal mood and affect and is in no acute distress.  He has pes planus with a collapsed plantar arch on the left side more than the right.  He has some lateral subluxation of his right patella.  In his right lower extremity there is no edema erythema or adenopathy.  He does have some fullness in the popliteal fossa and is mildly tender on the medial side.  His leg is well perfused and grossly sensate.  He is able to ambulate with slight limp.    I reviewed his imaging studies.  He has some very well defined ossified portions of synovial chondromatosis in the medial aspect of his posterior knee.  I suspect these are within a Baker's cyst.  He had these present on x-ray 5 years ago.    This patient has an episode of over exertion and pain throughout the right leg especially in the medial lateral aspects of the knee.  The popliteal tenderness on the right seem to come on or at least be exacerbated during that episode.  My preference is for him to work through this with physical  therapy.  If he has persistent popliteal pain we could remove the synovial chondromatosis tissue from his Baker's cyst however these have been present for many years while being asymptomatic and I am not convinced that they are the pain generator in this current episode.  Patient understands this impression will work with therapy.  If he does not have resolution of his popliteal discomfort then he will return to see me and we can discuss surgery again.    Again, thank you for allowing me to participate in the care of your patient.      Sincerely,    Saleem Johnson MD

## 2018-12-05 NOTE — PROGRESS NOTES
This 63-year-old male has a history of right leg pain.  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  He began to have pain in the hip and the thigh in the medial lateral aspect of the knee as well as the posterior aspect of the right knee.  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  He has minimal discomfort below the knee and does not seem to have any numbness or tingling complaints.  He has some knee pain going up and down stairs.  I reviewed his patient survey information in the EMR.    On examination he is alert oriented has a normal mood and affect and is in no acute distress.  He has pes planus with a collapsed plantar arch on the left side more than the right.  He has some lateral subluxation of his right patella.  In his right lower extremity there is no edema erythema or adenopathy.  He does have some fullness in the popliteal fossa and is mildly tender on the medial side.  His leg is well perfused and grossly sensate.  He is able to ambulate with slight limp.    I reviewed his imaging studies.  He has some very well defined ossified portions of synovial chondromatosis in the medial aspect of his posterior knee.  I suspect these are within a Baker's cyst.  He had these present on x-ray 5 years ago.    This patient has an episode of over exertion and pain throughout the right leg especially in the medial lateral aspects of the knee.  The popliteal tenderness on the right seem to come on or at least be exacerbated during that episode.  My preference is for him to work through this with physical therapy.  If he has persistent popliteal pain we could remove the synovial chondromatosis tissue from his Baker's cyst however these have been present for many years while being asymptomatic and I am not convinced that they are the pain generator in this current episode.  Patient understands this  impression will work with therapy.  If he does not have resolution of his popliteal discomfort then he will return to see me and we can discuss surgery again.

## 2018-12-05 NOTE — NURSING NOTE
"Reason For Visit:   Chief Complaint   Patient presents with     Right Knee - Consult       Ht 1.676 m (5' 6\")  Wt 92.1 kg (203 lb)  BMI 32.77 kg/m2    Pain Assessment  Patient Currently in Pain: Yes  Primary Pain Location: Knee  Pain Orientation: Right  Pain Descriptors: Aching  Aggravating Factors: Walking    Stephane More ATC  "

## 2018-12-10 ENCOUNTER — TELEPHONE (OUTPATIENT)
Dept: ORTHOPEDICS | Facility: CLINIC | Age: 63
End: 2018-12-10

## 2018-12-10 NOTE — TELEPHONE ENCOUNTER
[facility] Call Center    Phone Message    May a detailed message be left on voicemail: yes    Reason for Call: Other: Pt requesting call back to discuss surgery with Dr. Johnson. Pt wanting to know if he can also do a knee replacement as well     Action Taken: Message routed to:  [facility] ([facility] ortho

## 2018-12-12 ENCOUNTER — TELEPHONE (OUTPATIENT)
Dept: ORTHOPEDICS | Facility: CLINIC | Age: 63
End: 2018-12-12

## 2018-12-12 NOTE — TELEPHONE ENCOUNTER
RN called and left a voice message with Roni.  Based on Dr. Johnson's clinic note  From 12-05-18, You should be working with therapy, if this does not resolve your pain. Please call and make an appointment to come back in and discuss surgery.   ( My preference is for him to work through this with physical therapy.  If he has persistent popliteal pain we could remove the synovial chondromatosis tissue from his Baker's cyst however these have been present for many years while being asymptomatic and I am not convinced that they are the pain generator in this current episode.  Patient understands this impression will work with therapy.  If he does not have resolution of his popliteal discomfort then he will return to see me and we can discuss surgery again.)      Call Back (questions about surgery)     Jeevan Kinney ATC routed conversation to You 2 days ago      Maritza Nicholas routed conversation to [facility] 2 days ago      Maritza Nicholas 2 days ago         [facility]     Phone Message     May a detailed message be left on voicemail: yes     Reason for Call: Other: Pt requesting call back to discuss surgery with Dr. Johnson. Pt wanting to know if he can also do a knee replacement as well      Action Taken: Message routed to:  [facility] ([facility] ortho         Documentation       Roni Rebolledo P [phone]  Maritza Nicholas Christian McKay, MD   Orthopaedic Surgery   Synovial chondromatosis   Dx   Right Knee - Consult ; Referred by Marquez Gill MD   Reason for Visit    Progress Notes   Saleem Johnson MD (Physician)     Orthopaedic Surgery      This 63-year-old male has a history of right leg pain.  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  He began to have pain in the hip and the thigh in the medial lateral aspect of the knee as well as the posterior aspect of the right knee.  X-rays  showed synovial chondromatosis which is been present for several years.  He has minimal discomfort below the knee and does not seem to have any numbness or tingling complaints.  He has some knee pain going up and down stairs.  I reviewed his patient survey information in the EMR.     On examination he is alert oriented has a normal mood and affect and is in no acute distress.  He has pes planus with a collapsed plantar arch on the left side more than the right.  He has some lateral subluxation of his right patella.  In his right lower extremity there is no edema erythema or adenopathy.  He does have some fullness in the popliteal fossa and is mildly tender on the medial side.  His leg is well perfused and grossly sensate.  He is able to ambulate with slight limp.     I reviewed his imaging studies.  He has some very well defined ossified portions of synovial chondromatosis in the medial aspect of his posterior knee.  I suspect these are within a Baker's cyst.  He had these present on x-ray 5 years ago.     This patient has an episode of over exertion and pain throughout the right leg especially in the medial lateral aspects of the knee.  The popliteal tenderness on the right seem to come on or at least be exacerbated during that episode.  My preference is for him to work through this with physical therapy.  If he has persistent popliteal pain we could remove the synovial chondromatosis tissue from his Baker's cyst however these have been present for many years while being asymptomatic and I am not convinced that they are the pain generator in this current episode.  Patient understands this impression will work with therapy.  If he does not have resolution of his popliteal discomfort then he will return to see me and we can discuss surgery again.

## 2018-12-31 ENCOUNTER — OFFICE VISIT (OUTPATIENT)
Dept: URGENT CARE | Facility: URGENT CARE | Age: 63
End: 2018-12-31
Payer: COMMERCIAL

## 2018-12-31 VITALS
DIASTOLIC BLOOD PRESSURE: 96 MMHG | TEMPERATURE: 98.2 F | WEIGHT: 197.6 LBS | HEART RATE: 81 BPM | SYSTOLIC BLOOD PRESSURE: 124 MMHG | OXYGEN SATURATION: 96 % | BODY MASS INDEX: 31.89 KG/M2

## 2018-12-31 DIAGNOSIS — R05.9 COUGH: Primary | ICD-10-CM

## 2018-12-31 DIAGNOSIS — J02.9 SORE THROAT: ICD-10-CM

## 2018-12-31 LAB
DEPRECATED S PYO AG THROAT QL EIA: NORMAL
FLUAV+FLUBV AG SPEC QL: NEGATIVE
FLUAV+FLUBV AG SPEC QL: NEGATIVE
SPECIMEN SOURCE: NORMAL
SPECIMEN SOURCE: NORMAL

## 2018-12-31 PROCEDURE — 87804 INFLUENZA ASSAY W/OPTIC: CPT | Performed by: FAMILY MEDICINE

## 2018-12-31 PROCEDURE — 87081 CULTURE SCREEN ONLY: CPT | Performed by: FAMILY MEDICINE

## 2018-12-31 PROCEDURE — 99213 OFFICE O/P EST LOW 20 MIN: CPT | Performed by: FAMILY MEDICINE

## 2018-12-31 PROCEDURE — 87880 STREP A ASSAY W/OPTIC: CPT | Performed by: FAMILY MEDICINE

## 2019-01-01 LAB
BACTERIA SPEC CULT: NORMAL
SPECIMEN SOURCE: NORMAL

## 2019-01-01 NOTE — PROGRESS NOTES
SUBJECTIVE:   Roni Rebolledo is a 63 year old male presenting with a chief complaint of cough, congestion, runny nose, tinnitus, elevated temp.  Thought was flu as this feels similar.  Onset of symptoms was 3 day(s) ago.  Course of illness is worsening.    Severity moderate  Current and Associated symptoms: congestion, sore throat  Treatment measures tried include Tylenol/Ibuprofen, Fluids and Rest.  Predisposing factors include None.    Did not obtain flu vaccination this year, had influenza B last year.    Past Medical History:   Diagnosis Date     Anxiety 2009     Ascending aortic aneurysm (H) 2015     Bunion, left 2016     Bunion, right 2016     CARDIOVASCULAR SCREENING; LDL GOAL LESS THAN 130 10/31/2010     Carpal tunnel syndrome of left wrist 2016     Carpal tunnel syndrome on both sides      Cervical radiculopathy 2016     Elevated prostate specific antigen (PSA) 2015     Flank pain 2015     HTN, goal below 140/90 2013     Immunization deficiency 2015     Lumbar radiculopathy 2016     Onychomycosis 2016     Overweight 2016     Urinary retention      Current Outpatient Medications   Medication Sig Dispense Refill     benzonatate (TESSALON) 200 MG capsule Take 1 capsule (200 mg) by mouth 3 times daily as needed for cough (Patient not taking: Reported on 2018) 21 capsule 0     triamterene-hydrochlorothiazide (MAXZIDE-25) 37.5-25 MG per tablet Take 1 tablet by mouth every morning (Patient not taking: Reported on 2018) 90 tablet 1     Social History     Tobacco Use     Smoking status: Former Smoker     Last attempt to quit: 1/3/1987     Years since quittin.0     Smokeless tobacco: Never Used   Substance Use Topics     Alcohol use: Yes     Alcohol/week: 0.0 oz     Comment: wine 5 times a wk --glass or 2       ROS:  Review of systems otherwise negative except as stated above.    OBJECTIVE:  BP (!) 124/96 (BP Location: Right arm, Patient  Position: Chair, Cuff Size: Adult Regular)   Pulse 81   Temp 98.2  F (36.8  C) (Oral)   Wt 89.6 kg (197 lb 9.6 oz)   SpO2 96%   BMI 31.89 kg/m    GENERAL APPEARANCE: healthy, alert and no distress  EYES: EOMI,  PERRL, conjunctiva clear  HENT: ear canals and TM's normal.  Nose and mouth without ulcers, erythema or lesions  NECK: supple, nontender, no lymphadenopathy  RESP: lungs clear to auscultation - no rales, rhonchi or wheezes  CV: regular rates and rhythm, normal S1 S2, no murmur noted  Extremities: no peripheral edema or tenderness, peripheral pulses normal  PSYCH: mentation appears normal and affect normal/bright    Results for orders placed or performed in visit on 12/31/18   Influenza A/B antigen   Result Value Ref Range    Influenza A/B Agn Specimen Nasal     Influenza A Negative NEG^Negative    Influenza B Negative NEG^Negative   Rapid strep screen   Result Value Ref Range    Specimen Description Throat     Rapid Strep A Screen       NEGATIVE: No Group A streptococcal antigen detected by immunoassay, await culture report.       ASSESSMENT/PLAN:  (R05) Cough  (primary encounter diagnosis)  Comment: viral  Plan: Influenza A/B antigen            (J02.9) Sore throat  Comment: viral  Plan: Rapid strep screen, Beta strep group A culture            Reassurance given, reviewed symptomatic treatment for viral URI symptoms with tylenol, ibuprofen, plenty of fluids and rest.  Will follow up on throat culture and treat if positive for strep.  Discussed limitation for influenza screening but that as symptoms outside 48 hour window that Tamiflu would not be effective.  Declined cough suppressant medication, states that cough has improved and is able to sleep.    Follow up with primary provider if no resolution of symptoms.    Emir Enriquez MD

## 2019-07-02 ENCOUNTER — TRANSFERRED RECORDS (OUTPATIENT)
Dept: HEALTH INFORMATION MANAGEMENT | Facility: CLINIC | Age: 64
End: 2019-07-02

## 2019-11-16 ENCOUNTER — TRANSFERRED RECORDS (OUTPATIENT)
Dept: HEALTH INFORMATION MANAGEMENT | Facility: CLINIC | Age: 64
End: 2019-11-16

## 2019-11-22 ENCOUNTER — NURSE TRIAGE (OUTPATIENT)
Dept: NURSING | Facility: CLINIC | Age: 64
End: 2019-11-22

## 2019-11-22 PROCEDURE — 99284 EMERGENCY DEPT VISIT MOD MDM: CPT

## 2019-11-23 ENCOUNTER — HOSPITAL ENCOUNTER (EMERGENCY)
Facility: CLINIC | Age: 64
Discharge: HOME | End: 2019-11-23
Attending: EMERGENCY MEDICINE | Admitting: EMERGENCY MEDICINE
Payer: MEDICARE

## 2019-11-23 VITALS
RESPIRATION RATE: 18 BRPM | DIASTOLIC BLOOD PRESSURE: 91 MMHG | HEART RATE: 70 BPM | OXYGEN SATURATION: 98 % | TEMPERATURE: 98.6 F | SYSTOLIC BLOOD PRESSURE: 139 MMHG

## 2019-11-23 DIAGNOSIS — M54.2 NECK PAIN: ICD-10-CM

## 2019-11-23 DIAGNOSIS — M47.812 SPONDYLOSIS OF CERVICAL REGION WITHOUT MYELOPATHY OR RADICULOPATHY: ICD-10-CM

## 2019-11-23 PROCEDURE — 25000132 ZZH RX MED GY IP 250 OP 250 PS 637: Mod: GY | Performed by: EMERGENCY MEDICINE

## 2019-11-23 PROCEDURE — 25000128 H RX IP 250 OP 636: Performed by: EMERGENCY MEDICINE

## 2019-11-23 PROCEDURE — 96372 THER/PROPH/DIAG INJ SC/IM: CPT

## 2019-11-23 RX ORDER — LIDOCAINE 4 G/G
1 PATCH TOPICAL ONCE
Status: DISCONTINUED | OUTPATIENT
Start: 2019-11-23 | End: 2019-11-23 | Stop reason: HOSPADM

## 2019-11-23 RX ORDER — DIAZEPAM 5 MG
5 TABLET ORAL ONCE
Status: COMPLETED | OUTPATIENT
Start: 2019-11-23 | End: 2019-11-23

## 2019-11-23 RX ORDER — LIDOCAINE 4 G/G
1 PATCH TOPICAL DAILY PRN
Qty: 15 PATCH | Refills: 0 | Status: SHIPPED | OUTPATIENT
Start: 2019-11-23 | End: 2021-05-10

## 2019-11-23 RX ORDER — DIAZEPAM 5 MG
5 TABLET ORAL EVERY 6 HOURS PRN
Qty: 10 TABLET | Refills: 0 | Status: SHIPPED | OUTPATIENT
Start: 2019-11-23 | End: 2019-11-30

## 2019-11-23 RX ORDER — ACETAMINOPHEN 500 MG
1000 TABLET ORAL ONCE
Status: COMPLETED | OUTPATIENT
Start: 2019-11-23 | End: 2019-11-23

## 2019-11-23 RX ORDER — NAPROXEN 500 MG/1
500 TABLET ORAL 2 TIMES DAILY WITH MEALS
Qty: 14 TABLET | Refills: 0 | Status: SHIPPED | OUTPATIENT
Start: 2019-11-23 | End: 2020-01-16

## 2019-11-23 RX ORDER — KETOROLAC TROMETHAMINE 30 MG/ML
30 INJECTION, SOLUTION INTRAMUSCULAR; INTRAVENOUS ONCE
Status: COMPLETED | OUTPATIENT
Start: 2019-11-23 | End: 2019-11-23

## 2019-11-23 RX ADMIN — DIAZEPAM 5 MG: 5 TABLET ORAL at 01:20

## 2019-11-23 RX ADMIN — LIDOCAINE 1 PATCH: 560 PATCH PERCUTANEOUS; TOPICAL; TRANSDERMAL at 01:21

## 2019-11-23 RX ADMIN — KETOROLAC TROMETHAMINE 30 MG: 30 INJECTION, SOLUTION INTRAMUSCULAR at 01:20

## 2019-11-23 RX ADMIN — ACETAMINOPHEN 1000 MG: 500 TABLET, FILM COATED ORAL at 01:20

## 2019-11-23 ASSESSMENT — ENCOUNTER SYMPTOMS
BACK PAIN: 0
NERVOUS/ANXIOUS: 1
NECK PAIN: 1
FEVER: 0
MYALGIAS: 1

## 2019-11-23 NOTE — TELEPHONE ENCOUNTER
"Roni reports neck pain, rated 3/10. Pain is constant. Thinks it may be due to nerve pain. States that he is unable to sleep due to the discomfort. Reports that there is weakness of both arms.     Per protocol, advised to head to ED. Care advice reviewed. Patient verbalizes understanding but might wait, if he is unable to sleep tonight, then will head to [facility] ED. Advised to call back with further questions/concerns.     Susana Juarez RN/[place] Nurse Advisor          Reason for Disposition    Weakness of an arm or hand    Additional Information    Negative: Shock suspected (e.g., cold/pale/clammy skin, too weak to stand, low BP, rapid pulse)    Negative: Difficult to awaken or acting confused (e.g., disoriented, slurred speech)    Negative: [1] Similar pain previously AND [2] it was from \"heart attack\"    Negative: [1] Similar pain previously AND [2] it was from \"angina\" AND [3] not relieved by nitroglycerin    Negative: Sounds like a life-threatening emergency to the triager    Negative: Difficulty breathing or unusual sweating (e.g., sweating without exertion)    Negative: [1] Stiff neck (can't put chin to chest) AND [2] headache    Negative: [1] Stiff neck (can't put chin to chest) AND [2] fever    Protocols used: NECK PAIN OR [id number]      "

## 2019-11-23 NOTE — ED AVS SNAPSHOT
[facility] Emergency Department  [address]  [facility] [address]  Phone:  [phone]  Fax:  [phone]                                    Roni Rebolledo   MRN: [id number]    Department:  [facility] Emergency Department   Date of Visit:  11/22/2019           After Visit Summary Signature Page    I have received my discharge instructions, and my questions have been answered. I have discussed any challenges I see with this plan with the nurse or doctor.    ..........................................................................................................................................  Patient/Patient Representative Signature      ..........................................................................................................................................  Patient Representative Print Name and Relationship to Patient    ..................................................               ................................................  Date                                   Time    ..........................................................................................................................................  Reviewed by Signature/Title    ...................................................              ..............................................  Date                                               Time          22EPIC Rev 08/18

## 2019-11-23 NOTE — ED TRIAGE NOTES
"Neck pain that radiates to Rt shoulder x 2 weeks.  Had massage tonight and now pain is much worse.  States \"I'm not going home without and MRI\".  "

## 2019-11-23 NOTE — ED PROVIDER NOTES
"History     Chief Complaint:  Neck Pain     HPI  Roni Rebolledo is a 64 year old male with history of cervical radiculopathy and cervical DJD who presents to the emergency department today with exacerbation of neck pain. The patient states that he slept on his neck wrong about 2 weeks ago and he states that he has had a constant \"knife\" pain to the right neck, shoulder and front chest region. He states that it does radiate to the left. He states that the right arm feels fatigued but denies any numbness, tingling, or weakness. He states the pain is better with pressure to the back of the head and with walking. He states he has been to the chiropractor 3 times, a massage therapist and went to [facility]  6 days ago and obtained an image of the c-spine and was told he had mild arthritis. He states that he was given a muscle relaxer, that only slightly masks the pain, and course of steroids that he has been off since yesterday. He states he had a massage tonight and was feeling better until after when it re-cramped up. He denies fever or low back issues currently. He notes that with his prostate he is also up at night more often urinating. He denies any fall or injury. No low back symptoms. No fever. No history of malignancy.     Allergies:  Lisinopril    Medications:    Tessalon  Maxide  Flomax  Proscar  Uroxatral    Past Medical History:    Anxiety   Ascending aortic aneurysm   Bunion  Carpal tunnel syndrome   Cervical radiculopathy   Elevated prostate specific antigen (PSA)    HTN   Immunization deficiency   Lumbar radiculopathy   Onychomycosis   Overweight   Urinary retention    Past Surgical History:    Knee arthroscopy  Shoulder arthroscopy  ACL repair  Prostate biopsy    Family History:    Mother - brain cancer  Father - dementia  Brother - CAD    Social History:  The patient was accompanied to the ED alone.  Smoking Status: Former  Smokeless Tobacco: Never  Alcohol Use: Yes   Drug Use: No   PCP: Roge Alvarado "   Marital Status:      Review of Systems   Constitutional: Negative for fever.   Musculoskeletal: Positive for myalgias and neck pain. Negative for back pain.   Psychiatric/Behavioral: The patient is nervous/anxious.    All other systems reviewed and are negative.       Physical Exam     Patient Vitals for the past 24 hrs:   BP Temp Temp src Pulse Heart Rate Resp SpO2   11/23/19 0200 (!) 140/87 -- -- 74 -- 18 99 %   11/23/19 0012 (!) 171/140 98.5  F (36.9  C) Temporal 83 83 16 100 %        Physical Exam  General: Appears anxious, pacing. Nontoxic.   Head:  Scalp, face, and head appear normal, atraumatic.  Neck:  Cervical spine nontender, no stepoffs.  Normal range of motion.  Patient with increased muscle tension and soft tissue and muscle tenderness to palpation over the right posterior cervical paraspinous muscles as well as trapezius extending down inferior and laterally over the right shoulder.  The right shoulder is normal with full range of motion.  No joint effusion.  Clavicle is normal.  AC joints nontender.  No overlying skin changes, swelling, crepitus, ecchymosis.  Eyes:  Pupils equal, round    Conjunctivae noninjected and sclera white  ENT:    The nose is normal    Ears/pinnae are normal  CV:  RRR, no M/R/G. Radial pulses 2+ bilaterally   Resp:  CTAB, no increased WOB   MSK:  CMS intact in the bilateral upper extremities.  Sensation circulation and motor function is intact in all peripheral distributions of the bilateral hands and upper extremities.  No numbness tingling or weakness.  Skin:  No rash or lesions noted.  Neuro: Speech is normal and fluent    Moves all extremities spontaneously  Psych:  Anxious. Pressured speech. Awake, Alert.    Appropriate interactions    Emergency Department Course     Interventions:  0120 Toradol 30 mg IM  0120 Valium 5 mg PO  0120 Tylenol 1000 mg PO  0121 Lidocaine patch x1 transdermal     Emergency Department Course:  Nursing notes and vitals reviewed. (12:37  "AM) I performed an exam of the patient as documented above.     Medicine administered as documented above.    0301 I rechecked the patient and discussed the results of their workup thus far.     Findings and plan explained to the Patient. Patient discharged home with instructions regarding supportive care, medications, and reasons to return. The importance of close follow-up was reviewed. The patient was prescribed Acetaminophen, Valium, Lidocaine patch and Naproxen.    Impression & Plan       Medical Decision Making:  This patient presents with acute on chronic neck pain that is clearly musculoskeletal on examination.  Clinical examination is consistent with myofascial strain/spasm. No definite radicular symptoms. CMS is intact in all distal peripheral distributions. No trauma. Patient had XR just a few days who which he reports only showed \"mild arthritis\"  There is no evidence of cervical radiculopathy, ligamentous instability, myelopathy, dissection, spinal tumor or abscess at this time. He has had a [facility] MRI in 2017 which showed degenerative changes. I recommended supportive care at this time and follow up with spine specialist. Patient felt improved after the above interventions.  I discussed worrisome symptoms/signs, if they were to evolve, that should prompt the patient to follow up more quickly or return to the ED. Supportive outpatient management is indicated.  No indication for emergent MRI at this time. Return precautions were discussed with patient. The patient's questions were answered and the patient was agreeable with discharge.     Diagnosis:    ICD-10-CM    1. Spondylosis of cervical region without myelopathy or radiculopathy M47.812    2. Neck pain M54.2       Disposition:  Discharged to home.    Discharge Medications:  New Prescriptions    ACETAMINOPHEN 500 MG CAPS    Take 2 capsules by mouth every 8 hours as needed For aches, pain, fever    DIAZEPAM (VALIUM) 5 MG TABLET    Take 1 tablet (5 mg) " by mouth every 6 hours as needed for muscle spasms or pain (MUSCLE SPASM)    LIDOCAINE (LIDOCARE) 4 % PATCH    Place 1 patch onto the skin daily as needed for moderate pain (musculoskeletal pain) Remove patch after 12 hours. To prevent lidocaine toxicity, patient should be patch free for 12 hrs daily.    NAPROXEN (NAPROSYN) 500 MG TABLET    Take 1 tablet (500 mg) by mouth 2 times daily (with meals) for 7 days Do not take with ibuprofen/motrin/advil      Scribe Disclosure:  CLARENCE, Robi Guzman, am serving as a scribe at 12:37 AM on 11/23/2019 to document services personally performed by Jeevan Velázquez MD based on my observations and the provider's statements to me.      11/23/2019   [facility] EMERGENCY DEPARTMENT       Jeevan Velázquez MD  11/23/19 1926

## 2019-11-25 NOTE — PROGRESS NOTES
"NEUROSURGERY CLINIC CONSULT NOTE     DATE OF VISIT: 11/26/2019     SUBJECTIVE:     Roni Rebolledo is a pleasant 64 year old male who presents to the clinic today for consultation on some cervical spine pain. He is referred to the Neurosurgery Clinic by the Emergency Department on 11/23/2019.   Today, Mr. Rebolledo reports a three weeks history of symptoms. He describes constant, sharp pain that initiates in the midline cervical region and radiates distally in the left C*** distribution. This pain is not accompanied by paresthesias, numbness or perceived weakness, but he does feel a cramping sensation ***. Prolonged walking, standing, and *** aggravate the symptoms, while alleviation is obtained by ***. \"Sleeping on his neck wrong\" is associated with the onset of the pain. There are no bowel or bladder changes. He denies changes in gait, instability or falling episodes. There has been no significant change in his handwriting or hand dexterity. He has participated in conservative therapies to include muscle relaxants, Medrol Dosepak, massage therapy and chiropractic care which did provide relief, albeit minimal. The patient does *** smoke cigarettes.       Current Outpatient Medications:      acetaminophen 500 MG CAPS, Take 2 capsules by mouth every 8 hours as needed For aches, pain, fever, Disp: 60 capsule, Rfl: 0     benzonatate (TESSALON) 200 MG capsule, Take 1 capsule (200 mg) by mouth 3 times daily as needed for cough (Patient not taking: Reported on 12/31/2018), Disp: 21 capsule, Rfl: 0     diazepam (VALIUM) 5 MG tablet, Take 1 tablet (5 mg) by mouth every 6 hours as needed for muscle spasms or pain (MUSCLE SPASM), Disp: 10 tablet, Rfl: 0     Lidocaine (LIDOCARE) 4 % Patch, Place 1 patch onto the skin daily as needed for moderate pain (musculoskeletal pain) Remove patch after 12 hours. To prevent lidocaine toxicity, patient should be patch free for 12 hrs daily., Disp: 15 patch, Rfl: 0     naproxen (NAPROSYN) 500 " MG tablet, Take 1 tablet (500 mg) by mouth 2 times daily (with meals) for 7 days Do not take with ibuprofen/motrin/advil, Disp: 14 tablet, Rfl: 0     triamterene-hydrochlorothiazide (MAXZIDE-25) 37.5-25 MG per tablet, Take 1 tablet by mouth every morning (Patient not taking: Reported on 2018), Disp: 90 tablet, Rfl: 1     Allergies   Allergen Reactions     Lisinopril Cough     No Known Drug Allergies         Past Medical History:   Diagnosis Date     Anxiety 2009     Ascending aortic aneurysm (H) 2015     Bunion, left 2016     Bunion, right 2016     CARDIOVASCULAR SCREENING; LDL GOAL LESS THAN 130 10/31/2010     Carpal tunnel syndrome of left wrist 2016     Carpal tunnel syndrome on both sides      Cervical radiculopathy 2016     Elevated prostate specific antigen (PSA) 2015     Flank pain 2015     HTN, goal below 140/90 2013     Immunization deficiency 2015     Lumbar radiculopathy 2016     Onychomycosis 2016     Overweight 2016     Urinary retention         Past Surgical History:   Procedure Laterality Date     ARTHROSCOPY KNEE WITH LATERAL MENISCECTOMY      left     ARTHROSCOPY SHOULDER      bilat     COLONOSCOPY  2015    Dr. Vigil [facility]     COLONOSCOPY N/A 2015    Procedure: COLONOSCOPY;  Surgeon: Mariano Vigil MD;  Location:  GI     ORTHOPEDIC SURGERY      ACL repair left     PROSTATE BIOPSY, NEEDLE, SATURATION SAMPLING          Social History     Tobacco Use     Smoking status: Former Smoker     Last attempt to quit: 1/3/1987     Years since quittin.9     Smokeless tobacco: Never Used   Substance Use Topics     Alcohol use: Yes     Alcohol/week: 0.0 standard drinks     Comment: wine 5 times a wk --glass or 2     Drug use: No        Review of Symptoms - Pertinent findings noted in the HPI.     OBJECTIVE:   Ht: ***  Wt:  BMI:  R:  P:  BP:    Imaging:     None available at time of evaluation.     Exam:     Patient  appears comfortable, conversational, and in no apparent distress.   Head: Normocephalic, without obvious abnormality, atraumatic, no facial asymmetry.   Eyes: conjunctivae/corneas clear. PERRL, EOM's intact.   Throat: lips, mucosa, and tongue normal; teeth and gums normal.   Neck: supple, symmetrical, trachea midline, no adenopathy and thyroid: not enlarged, symmetric, no tenderness/mass/nodules.   Lungs: clear to auscultation bilaterally.   Heart: regular rate and rhythm.   Abdomen: soft, non-tender; bowel sounds normal; no masses, no organomegaly.   Pulses: 2+ and symmetric.   Skin: Skin color, texture, turgor normal. No rashes or lesions.     CN II-XII grossly intact, alert and appropriate with conversation and following commands.   Gait is non-antalgic. Able to tandem walk. Able to walk on toes and heels without difficulty***.   Cervical spine is *** tender to palpation. Appropriate range of motion of neck, not concerning for lhermitte's phenomenon.   Bilateral bicep ***/4 and tricep reflexes 1/4. Sensation intact throughout upper extremities.     UE muscle strength  Right  Left    Deltoid  5/5  5/5    Biceps  5/5  5/5    Triceps  5/5  5/5    Hand intrinsics  5/5  5/5    Hand grasp  5/5  5/5    Schafer signs  neg  neg      Lumbar spine is *** tender to palpation   Intact sensation throughout lower extremities.***   Bilateral patellar ***/4 and achilles reflex ***/4. ***Negative for pain with straight leg raise.     LE muscle strength  Right  Left    Iliopsoas (hip flexion)  5/5  5/5    Quad (knee extension)  5/5  5/5    Hamstring (knee flexion)  5/5  5/5    Gastrocnemius (PF)  5/5  5/5    Tibialis Ant. (DF)  5/5  5/5    EHL  5/5  5/5      ***Negative Babinski bilaterally. ***Negative for clonus.   Calves are soft and non-tender bilaterally.     ASSESSMENT/PLAN:     No diagnosis found.     Roni Rebolledo is a 64 year old male who presents to the clinic for consultation on***. The patient's most recent imaging was  reviewed with Dr. Salvador today. We did not have any available imaging available to review today. On exam, the patient is noted to have***. The patient has attempted conservative management with muscle relaxants, Medrol Dosepak, massage therapy and chiropractic care, without resolution of symptoms.   Surgical options were discussed with the patient today. Based on imaging and clinical evaluation, M*** would likely benefit from ***. Benefits as well as possible surgical risks including risk of nerve damage, infection, bleeding, residual or recurrent disk / symptoms, and spinal fluid leak were reviewed with the patient. ***An educational video was viewed by the patient for further understanding of the surgery as well as pre-operative and post-operative management.   ***The procedure was described as being a ***minimally invasive same-day surgery to be performed at [facility]. Normal postoperative recovery was summarized and included not lifting anything greater than 5-10 pounds, avoid excessive bending, twisting, and turning, and to make frequent position changes about every 30 minutes going from sitting, standing, and walking for six weeks approximately. The timing of post-operative follow-up appointments were also discussed. These will take place in the [facility].   The patient appeared to have a good understanding of the situation and asked appropriate questions which were answered. If changes or worsening of symptoms should occur prior to ***, ***he should seek being evaluated.      Respectfully,     ANTOINE Oliveira PA-C     ***Dr. Salvador met with the patient today and reviewed the above plan.   ***Exam, imaging, and plan reviewed by Dr. Salvador.

## 2019-11-26 ENCOUNTER — OFFICE VISIT (OUTPATIENT)
Dept: NEUROSURGERY | Facility: CLINIC | Age: 64
End: 2019-11-26
Attending: PHYSICIAN ASSISTANT
Payer: MEDICARE

## 2019-11-26 VITALS
SYSTOLIC BLOOD PRESSURE: 137 MMHG | RESPIRATION RATE: 16 BRPM | BODY MASS INDEX: 31.34 KG/M2 | OXYGEN SATURATION: 96 % | HEART RATE: 84 BPM | DIASTOLIC BLOOD PRESSURE: 90 MMHG | HEIGHT: 66 IN | TEMPERATURE: 97.4 F | WEIGHT: 195 LBS

## 2019-11-26 DIAGNOSIS — M54.2 NECK PAIN: Primary | ICD-10-CM

## 2019-11-26 DIAGNOSIS — M48.02 FORAMINAL STENOSIS OF CERVICAL REGION: ICD-10-CM

## 2019-11-26 PROCEDURE — G0463 HOSPITAL OUTPT CLINIC VISIT: HCPCS

## 2019-11-26 PROCEDURE — 99203 OFFICE O/P NEW LOW 30 MIN: CPT | Performed by: PHYSICIAN ASSISTANT

## 2019-11-26 RX ORDER — TAMSULOSIN HYDROCHLORIDE 0.4 MG/1
0.4 CAPSULE ORAL DAILY
COMMUNITY
End: 2021-05-10 | Stop reason: ALTCHOICE

## 2019-11-26 ASSESSMENT — MIFFLIN-ST. JEOR: SCORE: 1612.26

## 2019-11-26 ASSESSMENT — PAIN SCALES - GENERAL: PAINLEVEL: MODERATE PAIN (4)

## 2019-11-26 NOTE — NURSING NOTE
"Roni Rebolledo is a 64 year old male who presents for:  Chief Complaint   Patient presents with     Follow Up        Initial Vitals:  BP (!) 137/90   Pulse 84   Temp 97.4  F (36.3  C) (Oral)   Resp 16   Ht 5' 6\" (1.676 m)   Wt 195 lb (88.5 kg)   SpO2 96%   BMI 31.47 kg/m   Estimated body mass index is 31.47 kg/m  as calculated from the following:    Height as of this encounter: 5' 6\" (1.676 m).    Weight as of this encounter: 195 lb (88.5 kg).. Body surface area is 2.03 meters squared. BP completed using cuff size: regular  Moderate Pain (4)        Nursing Comments: Pt present today for neck pain.        Bang Meléndez, [facility]    "

## 2019-11-26 NOTE — PATIENT INSTRUCTIONS
Recommend a trial of cervical traction with physical therapy and have a home unit issued if appropriate.

## 2019-11-26 NOTE — LETTER
11/26/2019         RE: Roni Rebolledo  [address]  [facility] [address]        Dear Colleague,    Thank you for referring your patient, Roni Rebolledo, to the [facility]. Please see a copy of my visit note below.    Neurosurgery Clinic Consult    HPI    Mr. Rebolledo is a 64-year-old male reports a long history of neck pain and presents for follow-up after being in the ER few days ago.  He states he is having right-sided neck pain goes between his neck and his upper scapular region is waxing and waning but more recently his hips.  He was in physical therapy or just a few days ago and feels this is made things better he is been working with PT at [facility] orthopedics.  He denies any radicular symptoms in his arms denies any loss of coordination in his hands or difficulty with walking.  He he has a cervical MRI from 2 years ago and from about 10 years ago and they are stable.  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        Assessment    64-year-old male with neck pain most likely musculoskeletal but could be are somewhat related to the foraminal stenosis seen on this at C3-4 and his MRI from 2 years ago.    Plan:      Recommend the patient keep working with physical therapy and do a trial of cervical traction and  have a home unit issued if appropriate.  If his symptoms do not improve or if he develops more radicular pain he can certainly see him back in clinic and update his cervical MRI at that time.      Total time with 30 minutes spent with the patient today.  50% spent in consultation and coordination of care regarding his neck pain    Again, thank you for allowing me to participate in the care of your patient.        Sincerely,        Monico To PA-C

## 2019-11-26 NOTE — PROGRESS NOTES
Neurosurgery Clinic Consult    HPI    Mr. Rebolledo is a 64-year-old male reports a long history of neck pain and presents for follow-up after being in the ER few days ago.  He states he is having right-sided neck pain goes between his neck and his upper scapular region is waxing and waning but more recently his hips.  He was in physical therapy or just a few days ago and feels this is made things better he is been working with PT at [facility] orthopedics.  He denies any radicular symptoms in his arms denies any loss of coordination in his hands or difficulty with walking.  He he has a cervical MRI from 2 years ago and from about 10 years ago and they are stable.  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        Assessment    64-year-old male with neck pain most likely musculoskeletal but could be are somewhat related to the foraminal stenosis seen on this at C3-4 and his MRI from 2 years ago.    Plan:      Recommend the patient keep working with physical therapy and do a trial of cervical traction and have a home unit issued if appropriate.  If his symptoms do not improve or if he develops more radicular pain he can certainly see him back in clinic and update his cervical MRI at that time.      Total time with 30 minutes spent with the patient today.  50% spent  in consultation and coordination of care regarding his neck pain

## 2020-01-16 ENCOUNTER — OFFICE VISIT (OUTPATIENT)
Dept: URGENT CARE | Facility: URGENT CARE | Age: 65
End: 2020-01-16
Payer: MEDICARE

## 2020-01-16 VITALS
OXYGEN SATURATION: 97 % | HEART RATE: 81 BPM | DIASTOLIC BLOOD PRESSURE: 94 MMHG | TEMPERATURE: 98.9 F | SYSTOLIC BLOOD PRESSURE: 154 MMHG | RESPIRATION RATE: 18 BRPM

## 2020-01-16 DIAGNOSIS — R68.89 FLU-LIKE SYMPTOMS: ICD-10-CM

## 2020-01-16 DIAGNOSIS — J11.1 INFLUENZA-LIKE ILLNESS: Primary | ICD-10-CM

## 2020-01-16 LAB
FLUAV+FLUBV AG SPEC QL: NEGATIVE
FLUAV+FLUBV AG SPEC QL: NEGATIVE
SPECIMEN SOURCE: NORMAL

## 2020-01-16 PROCEDURE — 87804 INFLUENZA ASSAY W/OPTIC: CPT | Performed by: FAMILY MEDICINE

## 2020-01-16 PROCEDURE — 99213 OFFICE O/P EST LOW 20 MIN: CPT | Performed by: FAMILY MEDICINE

## 2020-01-16 RX ORDER — OSELTAMIVIR PHOSPHATE 75 MG/1
75 CAPSULE ORAL 2 TIMES DAILY
Qty: 10 CAPSULE | Refills: 0 | Status: SHIPPED | OUTPATIENT
Start: 2020-01-16 | End: 2020-01-21

## 2020-01-16 RX ORDER — FINASTERIDE 5 MG/1
5 TABLET, FILM COATED ORAL
COMMUNITY
Start: 2019-07-03 | End: 2020-07-02

## 2020-01-16 RX ORDER — ALFUZOSIN HYDROCHLORIDE 10 MG/1
1 TABLET, EXTENDED RELEASE ORAL DAILY
Refills: 3 | COMMUNITY
Start: 2019-11-11 | End: 2022-11-15

## 2020-01-16 NOTE — PROGRESS NOTES
SUBJECTIVE:   Roni Rebolledo is a 64 year old male presenting with a chief complaint of URI symptoms intially last week, was feeling better.  Developed fever, body aches and cough today  Onset of symptoms was today.  Course of illness is worsening.    Severity moderate  Current and Associated symptoms: fever, body aches, cough  Treatment measures tried include Tylenol/Ibuprofen, Fluids and Rest.  Predisposing factors include None.    No flu vaccination    Past Medical History:   Diagnosis Date     Anxiety 12/23/2009     Ascending aortic aneurysm (H) 5/6/2015     Bunion, left 5/17/2016     Bunion, right 5/17/2016     CARDIOVASCULAR SCREENING; LDL GOAL LESS THAN 130 10/31/2010     Carpal tunnel syndrome of left wrist 5/17/2016     Carpal tunnel syndrome on both sides      Cervical radiculopathy 5/17/2016     Elevated prostate specific antigen (PSA) 5/6/2015     Flank pain 5/6/2015     HTN, goal below 140/90 12/23/2013     Immunization deficiency 5/6/2015     Lumbar radiculopathy 5/17/2016     Onychomycosis 5/17/2016     Overweight 5/17/2016     Urinary retention      Current Outpatient Medications   Medication Sig Dispense Refill     alfuzosin ER (UROXATRAL) 10 MG 24 hr tablet Take 1 tablet by mouth daily  3     finasteride (PROSCAR) 5 MG tablet Take 5 mg by mouth       acetaminophen 500 MG CAPS Take 2 capsules by mouth every 8 hours as needed For aches, pain, fever 60 capsule 0     Lidocaine (LIDOCARE) 4 % Patch Place 1 patch onto the skin daily as needed for moderate pain (musculoskeletal pain) Remove patch after 12 hours. To prevent lidocaine toxicity, patient should be patch free for 12 hrs daily. 15 patch 0     tamsulosin (FLOMAX) 0.4 MG capsule Take 0.4 mg by mouth daily       triamterene-hydrochlorothiazide (MAXZIDE-25) 37.5-25 MG per tablet Take 1 tablet by mouth every morning (Patient not taking: Reported on 1/16/2020) 90 tablet 1     Social History     Tobacco Use     Smoking status: Former Smoker     Last  attempt to quit: 1/3/1987     Years since quittin.0     Smokeless tobacco: Never Used   Substance Use Topics     Alcohol use: Yes     Alcohol/week: 0.0 standard drinks     Comment: wine 5 times a wk --glass or 2       ROS:  Review of systems negative except as stated above.    OBJECTIVE:  BP (!) 154/94   Pulse 81   Temp 98.9  F (37.2  C) (Tympanic)   Resp 18   SpO2 97%   GENERAL APPEARANCE: healthy, alert and no distress  RESP: lungs clear to auscultation - no rales, rhonchi or wheezes  CV: regular rates and rhythm, normal S1 S2, no murmur noted  PSYCH: mentation appears normal and affect normal/bright    Results for orders placed or performed in visit on 20   Influenza A/B antigen     Status: None   Result Value Ref Range    Influenza A/B Agn Specimen Nasal     Influenza A Negative NEG^Negative    Influenza B Negative NEG^Negative       ASSESSMENT/PLAN:  (R69) Influenza-like illness  (primary encounter diagnosis)  Plan: oseltamivir (TAMIFLU) 75 MG capsule            (R68.89) Flu-like symptoms  Plan: Influenza A/B antigen            Reassurance given, reviewed symptomatic treatment with tylenol, plenty of fluids and rest.  Reviewed influenza screen limitation, patient would like to be treated empirically.  RX Tamiflu given as patient is within 48 hour window for effective treatment    Follow up with primary provider if no improvement of symptoms in 1 week    Emir Enriquez MD, MD  2020 6:10 PM

## 2020-01-28 ENCOUNTER — ANCILLARY PROCEDURE (OUTPATIENT)
Dept: GENERAL RADIOLOGY | Facility: CLINIC | Age: 65
End: 2020-01-28
Attending: INTERNAL MEDICINE
Payer: MEDICARE

## 2020-01-28 ENCOUNTER — OFFICE VISIT (OUTPATIENT)
Dept: URGENT CARE | Facility: URGENT CARE | Age: 65
End: 2020-01-28
Payer: MEDICARE

## 2020-01-28 VITALS
SYSTOLIC BLOOD PRESSURE: 148 MMHG | OXYGEN SATURATION: 97 % | WEIGHT: 195.8 LBS | BODY MASS INDEX: 31.6 KG/M2 | DIASTOLIC BLOOD PRESSURE: 88 MMHG | TEMPERATURE: 97.1 F | HEART RATE: 74 BPM

## 2020-01-28 DIAGNOSIS — R05.9 COUGH: ICD-10-CM

## 2020-01-28 DIAGNOSIS — J18.9 COMMUNITY ACQUIRED PNEUMONIA OF RIGHT LOWER LOBE OF LUNG: ICD-10-CM

## 2020-01-28 DIAGNOSIS — R05.9 COUGH: Primary | ICD-10-CM

## 2020-01-28 PROCEDURE — 99214 OFFICE O/P EST MOD 30 MIN: CPT | Performed by: INTERNAL MEDICINE

## 2020-01-28 PROCEDURE — 71046 X-RAY EXAM CHEST 2 VIEWS: CPT

## 2020-01-28 RX ORDER — CEFDINIR 300 MG/1
300 CAPSULE ORAL 2 TIMES DAILY
Qty: 20 CAPSULE | Refills: 0 | Status: SHIPPED | OUTPATIENT
Start: 2020-01-28 | End: 2020-02-07

## 2020-01-28 RX ORDER — DOXYCYCLINE 100 MG/1
100 TABLET ORAL DAILY
Qty: 10 TABLET | Refills: 0 | Status: SHIPPED | OUTPATIENT
Start: 2020-01-28 | End: 2020-02-07

## 2020-01-28 RX ORDER — PREDNISONE 20 MG/1
40 TABLET ORAL DAILY
Qty: 10 TABLET | Refills: 0 | Status: CANCELLED | OUTPATIENT
Start: 2020-01-28 | End: 2020-02-02

## 2020-01-29 ENCOUNTER — TELEPHONE (OUTPATIENT)
Dept: URGENT CARE | Facility: URGENT CARE | Age: 65
End: 2020-01-29

## 2020-01-29 NOTE — TELEPHONE ENCOUNTER
Reason for Call:  Other call back    Detailed comments: The pt was calling and he would like to speak to his care team about his most recent chest xray and if he needs to still be on his antibiotics.     Phone Number Patient can be reached at: Home number on file [phone] (home)    Best Time: Anytime    Can we leave a detailed message on this number? YES    Call taken on 1/29/2020 at 5:23 PM by Felicitas Smith

## 2020-01-29 NOTE — PROGRESS NOTES
Subjective     Roni Rebolledo is a 64 year old male who presents to clinic today for the following health issues:    HPI     Ongoing cold and moved into chest area.  - started getting sick, was on trip, was getting better and then worse. Last night with some black piece of material up, notes that was feeling better and now feels worse again.   - wheezing has been ongoing.   - has been spitting up-   - getting tickle then gets some material up.   - did course of tamiflu for possible influenza     - no fevers.   - no chest pain  -  Has been wheezing more  - feels congested with some maxillary sinus pain  - similar to episodes in the past requiring treatment    Otherwise feeling well. Normal eating/drinking. No abdominal pain. Has not needed albuterol before.        Patient Active Problem List   Diagnosis     Anxiety     CARDIOVASCULAR SCREENING; LDL GOAL LESS THAN 130     HTN, goal below 140/90     Ascending aortic aneurysm (H)     Elevated prostate specific antigen (PSA)     Flank pain     Immunization deficiency     Overweight     Cervical radiculopathy     Lumbar radiculopathy     Carpal tunnel syndrome of left wrist     Bunion, left     Bunion, right     Onychomycosis     Past Surgical History:   Procedure Laterality Date     ARTHROSCOPY KNEE WITH LATERAL MENISCECTOMY      left     ARTHROSCOPY SHOULDER      bilat     COLONOSCOPY  2015    Dr. Vigil [facility]     COLONOSCOPY N/A 2015    Procedure: COLONOSCOPY;  Surgeon: Mariano Vigil MD;  Location:  GI     ORTHOPEDIC SURGERY      ACL repair left     PROSTATE BIOPSY, NEEDLE, SATURATION SAMPLING         Social History     Tobacco Use     Smoking status: Former Smoker     Last attempt to quit: 1/3/1987     Years since quittin.0     Smokeless tobacco: Never Used   Substance Use Topics     Alcohol use: Yes     Alcohol/week: 0.0 standard drinks     Comment: wine 5 times a wk --glass or 2     Family History   Problem Relation Age of Onset      Other Cancer Mother         brain CA     Mental Illness Father         dementia     Coronary Artery Disease Brother      Colon Cancer No family hx of              Reviewed and updated as needed this visit by Provider         Review of Systems   ROS COMP: Constitutional, HEENT, cardiovascular, pulmonary, GI, , musculoskeletal, neuro, skin, endocrine and psych systems are negative, except as otherwise noted.      Objective    BP (!) 148/88   Pulse 74   Temp 97.1  F (36.2  C)   Wt 88.8 kg (195 lb 12.8 oz)   SpO2 97%   BMI 31.60 kg/m    Body mass index is 31.6 kg/m .  Physical Exam   GENERAL: healthy, alert and no distress  EYES: Eyes grossly normal to inspection, PERRL and conjunctivae and sclerae normal  HENT: bilateral maxillary sinus  Tenderness, MMM without lesiosn  NECK: no adenopathy, no asymmetry, masses, or scars and thyroid normal to palpation  RESP: prolonged expiratory phase, occasional rhonchi noted throughout, crackles at the right lung base area  CV: regular rate and rhythm, normal S1 S2, no S3 or S4, no murmur, click or rub, no peripheral edema and peripheral pulses strong  ABDOMEN: soft, nontender, no hepatosplenomegaly, no masses and bowel sounds normal  MS: no gross musculoskeletal defects noted, no edema  SKIN: no suspicious lesions or rashes  NEURO: Normal strength and tone, mentation intact and speech normal  PSYCH: mentation appears normal, affect normal/bright  Chest xray with possible early pneumonia at RLL base area        Assessment & Plan       ICD-10-CM    1. Cough R05 XR Chest 2 Views   2. Community acquired pneumonia of right lower lobe of lung (H) J18.1 doxycycline monohydrate (ADOXA) 100 MG tablet     cefdinir (OMNICEF) 300 MG capsule     Discussed warning signs for recheck, good oxygen saturations and normal work of breathing, appears well hydrated, discussed rechecking BLOOD PRESSURE  When feeling better. Declined steroids today    Patient Instructions   Start the cefdinir and  "the doxycycline for treatment of pneumonia as we discussed    Take a probiotic twice per day to prevent diarrhea while on the antibiotic. Florastor and culturelle are common brands, but generics work just as well.     Let us know if breathing is worsening and would add in steroids- prednisone as we discussed.    We will let you know xray when back.    Roge Alvarado MD         BMI:   Estimated body mass index is 31.6 kg/m  as calculated from the following:    Height as of 11/26/19: 1.676 m (5' 6\").    Weight as of this encounter: 88.8 kg (195 lb 12.8 oz).               Return in about 1 week (around 2/4/2020), or if symptoms worsen or fail to improve.    Roge Alvarado MD  [facility]        "

## 2020-01-29 NOTE — PATIENT INSTRUCTIONS
Start the cefdinir and the doxycycline for treatment of pneumonia as we discussed    Take a probiotic twice per day to prevent diarrhea while on the antibiotic. Florastor and culturelle are common brands, but generics work just as well.     Let us know if breathing is worsening and would add in steroids- prednisone as we discussed.    We will let you know xray when back.    Roge Alvarado MD

## 2020-01-30 NOTE — TELEPHONE ENCOUNTER
Please contact patient regarding his concerns -     Formal Xray report is normal but if clinical exam is concerning for pneumonia or bronchitis that requires an antibiotic then I would recommend taking the full course of the antibiotic.  Sometimes there is a delay in the Xray showing a pneumonia.    Please follow up with your primary provider if no improvement of symptoms in 1-2 weeks    Emir Enriquez MD, MD  January 30, 2020 9:40 AM

## 2020-01-30 NOTE — TELEPHONE ENCOUNTER
Called and relayed Dr. Enriquez's message with a detailed message (okay'd via initial call report). Instructed patient to call back if any further questions.    NATALI Brothers  10:27 AM 1/30/2020

## 2020-05-09 ENCOUNTER — NURSE TRIAGE (OUTPATIENT)
Dept: NURSING | Facility: CLINIC | Age: 65
End: 2020-05-09

## 2020-05-10 NOTE — TELEPHONE ENCOUNTER
"While standing either earlier today and also yesterday brief episode, it happened again just before this call. While standing he became dizzy, like he was losing his balance, did not pass out. He had wine 2 glasses earlier (6pm). He feels it is his neck and goes into his chest: on the right side in his pectoral muscles, feels like a tightness. No pain. The back of his neck hurt earlier, better now. Feels exhausted.   Hx of migraine. No headache currently.  Currently neck pain =\"1\". He has stretches he does for his neck and also applies heat. He thinks he had these sx tonight because he used the rototiller earlier today, and did not have much to drink (? Maybe a little dehydrated). He wanted to know if he were to come in if they could tell if he had had a stroke (yes).   Triaged to a disposition of See PCP within 3 days. Advised to call back or go to the ER if worsening sx.     Reason for Disposition    [1] MILD dizziness (e.g., walking normally) AND [2] has NOT been evaluated by physician for this  (Exception: dizziness caused by heat exposure, sudden standing, or poor fluid intake)    Additional Information    Negative: Severe difficulty breathing (e.g., struggling for each breath, speaks in single words)    Negative: [1] Difficulty breathing or swallowing AND [2] started suddenly after medicine, an allergic food or bee sting    Negative: Shock suspected (e.g., cold/pale/clammy skin, too weak to stand, low BP, rapid pulse)    Negative: Difficult to awaken or acting confused (e.g., disoriented, slurred speech)    Negative: [1] Weakness (i.e., paralysis, loss of muscle strength) of the face, arm or leg on one side of the body AND [2] sudden onset AND [3] present now    Negative: [1] Numbness (i.e., loss of sensation) of the face, arm or leg on one side of the body AND [2] sudden onset AND [3] present now    Negative: [1] Loss of speech or garbled speech AND [2] sudden onset AND [3] present now    Negative: Overdose " "(accidental or intentional) of medications    Negative: [1] Fainted > 15 minutes ago AND [2] still feels too weak or dizzy to stand    Negative: Heart beating < 50 beats per minute OR > 140 beats per minute    Negative: Sounds like a life-threatening emergency to the triager    Negative: Chest pain    Negative: Rectal bleeding, bloody stool, or tarry-black stool    Negative: [1] Vomiting AND [2] contains red blood or black (\"coffee ground\") material    Negative: Vomiting is main symptom    Negative: Diarrhea is main symptom    Negative: Headache is main symptom    Negative: Patient states that he/she is having an anxiety/panic attack    Negative: Dizziness from low blood sugar (i.e., < 60 mg/dl or 3.5 mmol/l)    Negative: Dizziness is described as a spinning sensation (i.e., vertigo)    Negative: Heat exhaustion suspected (i.e., dehydration from heat exposure)    Negative: Difficulty breathing    Negative: SEVERE dizziness (e.g., unable to stand, requires support to walk, feels like passing out now)    Negative: Extra heart beats OR irregular heart beating  (i.e., \"palpitations\")    Negative: [1] Drinking very little AND [2] dehydration suspected (e.g., no urine > 12 hours, very dry mouth, very lightheaded)    Negative: Patient sounds very sick or weak to the triager    Negative: [1] Dizziness caused by heat exposure, sudden standing, or poor fluid intake AND [2] no improvement after 2 hours of rest and fluids    Negative: [1] Fever > 103 F (39.4 C) AND [2] not able to get the fever down using Fever Care Advice    Negative: [1] Fever > 101 F (38.3 C) AND [2] age > 60    Negative: [1] Fever > 100.0 F (37.8 C) AND [2] bedridden (e.g., nursing home patient, CVA, chronic illness, recovering from surgery)    Negative: [1] Fever > 100.0 F (37.8 C) AND [2] diabetes mellitus or weak immune system (e.g., HIV positive, cancer chemo, splenectomy, chronic steroids)    Negative: [1] MODERATE dizziness (e.g., interferes with " "normal activities) AND [2] has NOT been evaluated by physician for this  (Exception: dizziness caused by heat exposure, sudden standing, or poor fluid intake)    Negative: Fever present > 3 days (72 hours)    Negative: Taking a medicine that could cause dizziness (e.g., blood pressure medications, diuretics)    Negative: [1] MODERATE dizziness (e.g., interferes with normal activities) AND [2] has been evaluated by physician for this    Negative: Shock suspected (e.g., cold/pale/clammy skin, too weak to stand, low BP, rapid pulse)    Negative: Difficult to awaken or acting confused (e.g., disoriented, slurred speech)    Negative: [1] Similar pain previously AND [2] it was from \"heart attack\"    Negative: [1] Similar pain previously AND [2] it was from \"angina\" AND [3] not relieved by nitroglycerin    Negative: Sounds like a life-threatening emergency to the triager    Negative: Followed a neck injury (e.g., MVA, sports, impact or collision)    Negative: Chest pain    Negative: Lymph node in the neck is swollen or painful to the touch    Negative: Sore throat is main symptom    Negative: Difficulty breathing or unusual sweating (e.g., sweating without exertion)    Negative: [1] Stiff neck (can't put chin to chest) AND [2] headache    Negative: [1] Stiff neck (can't put chin to chest) AND [2] fever    Negative: Weakness of an arm or hand    Negative: Problems with bowel or bladder control    Negative: Head is twisting to one side (or ask \"is it turning against your will?\")    Negative: Patient sounds very sick or weak to the triager    Negative: [1] SEVERE neck pain (e.g., excruciating, unable to do any normal activities) AND [2] not improved after 2 hours of pain medicine    Negative: [1] Fever > 100.0 F (37.8 C) AND [2] IVDA (intravenous drug abuse)    Negative: [1] Fever > 100.0 F (37.8 C) AND [2] diabetes mellitus or weak immune system (e.g., HIV positive, cancer chemo, splenectomy, chronic steroids)    Negative: " "Numbness in an arm or hand (i.e., loss of sensation)    Negative: Tenderness or swelling of front of neck over windpipe    Negative: Rash in same area as pain (may be described as \"small blisters\")    Negative: High-risk adult (e.g., history of cancer, HIV, or IV drug abuse)    Negative: [1] MODERATE neck pain (e.g., interferes with normal activities AND [2] present > 3 days    Negative: Neck pain present > 2 weeks    Negative: Pain shoots (radiates) into arm or hand    Negative: Neck pain is a chronic symptom (recurrent or ongoing AND present > 4 weeks)    Negative: [1] Age > 50 AND [2] no history of prior similar neck pain    Caused by a twisting, bending, or lifting injury    Protocols used: DIZZINESS - [id number], NECK PAIN OR [id number]      "

## 2020-11-10 ENCOUNTER — HOSPITAL ENCOUNTER (OUTPATIENT)
Facility: CLINIC | Age: 65
End: 2020-11-10
Attending: UROLOGY | Admitting: UROLOGY
Payer: MEDICARE

## 2020-11-15 DIAGNOSIS — Z11.59 ENCOUNTER FOR SCREENING FOR OTHER VIRAL DISEASES: Primary | ICD-10-CM

## 2020-11-28 ENCOUNTER — VIRTUAL VISIT (OUTPATIENT)
Dept: FAMILY MEDICINE | Facility: OTHER | Age: 65
End: 2020-11-28

## 2020-11-29 NOTE — PROGRESS NOTES
"Date: 2020 18:44:57  Clinician: Mary Hoyos  Clinician NPI: [id number]  Patient: Roni Rebolledo  Patient : 1955  Patient Address: [address] [place] [address], [place] [address]  Patient Phone: [phone]  Visit Protocol: URI  Patient Summary:  Roni is a 65 year old ( : 1955 ) male who initiated a [facility] Visit for COVID-19 (Coronavirus) evaluation and screening. When asked the question \"Please sign me up to receive news, health information and promotions. \", Roni responded \"No\".    Roni states his symptoms started 1-2 days ago.   His symptoms consist of a headache, myalgia, chills, and malaise. Roni also feels feverish.   Symptom details     Temperature: His current temperature is 103.6 degrees Fahrenheit. Roni has had a temperature over 100 degrees Fahrenheit for 1-2 days.     Headache: He states the headache is mild (1-3 on a 10 point pain scale).      Roni denies having ear pain, wheezing, cough, nasal congestion, nausea, vomiting, rhinitis, facial pain or pressure, sore throat, teeth pain, ageusia, diarrhea, and anosmia. He also denies taking antibiotic medication in the past month, having recent facial or sinus surgery in the past 60 days, and having a sinus infection within the past year. He is not experiencing dyspnea.   Precipitating events  He has not recently been exposed to someone with influenza. Roni has been in close contact with the following high risk individuals: adults 65 or older.   Pertinent COVID-19 (Coronavirus) information  Roni does not work or volunteer as healthcare worker or a . In the past 14 days, oRni has not worked or volunteered at a healthcare facility or group living setting.   In the past 14 days, he also has not lived in a congregate living setting.   Roni has not had a close contact with a laboratory-confirmed COVID-19 patient within 14 days of symptom onset.    Since 2019, Roni has not been tested for COVID-19 and has not " had upper respiratory infection or influenza-like illness.   Triage Point(s) temporarily suspended for COVID-19 (Coronavirus) screening  Roni reported the following symptoms/conditions. These are protocol referral points that have temporarily been removed for purposes of COVID-19 (Coronavirus) screening.   Temperature &gt; 102. Current temperature: 103.6    Pertinent medical history  He has not been told by his provider to avoid NSAIDs.   Roni does not have diabetes. He denies having immunosuppressive conditions (e.g., chemotherapy, HIV, organ transplant, long-term use of steroids or other immunosuppressive medications, splenectomy). He does not have severe COPD and congestive heart failure. He does not have asthma.   Roni does not need a return to work/school note.   Weight: 184 lbs   Roni does not smoke or use smokeless tobacco.   Additional information as reported by the patient (free text): Varying temp of 100-103.6 in past 48 hours. Had one episode of blood in urine eight hors ago. Scheduled for turp surgery on Dec 8, 2020 at [facility] with Dr Angel. CV test set for Dec 4.   Weight: 184 lbs    MEDICATIONS: No current medications, ALLERGIES: NKDA  Clinician Response:  Dear Roni,  Based on the information provided, you have a viral upper respiratory infection, otherwise known as a cold. Symptoms vary from person to person, but can include sneezing, coughing, a runny nose, sore throat, and headache and range from mild to severe.  Unfortunately, there are no medications that can cure a cold, so treatment is focused on controlling symptoms as much as possible. Most people gradually feel better until symptoms are gone in 1-2 weeks.  Medication information  Because you have a viral infection, antibiotics will not help you get better. Treating a viral infection with antibiotics could actually make you feel worse.  Unless you are allergic to the over-the-counter medication(s) below, I recommend using:        Acetaminophen (Tylenol or store brand) oral tablet. Take 1-2 tablets by mouth every 4-6 hours to help with the discomfort.      Ibuprofen (Advil or store brand) 200 mg oral tablet. Take 1-3 tablets (200-600 mg) by mouth every 8 hours to help with the discomfort. Make sure to take the ibuprofen with food. Do not exceed 2400 mg in 24 hours.    A decongestant such as Sudafed PE or store brand.      Oxymetazoline (Afrin or store brand) nasal spray. Use 1 spray in each nostril 2 times a day for a maximum of 3 days. Using this medication more frequently or longer than recommended may cause nasal congestion to reoccur or worsen. Sinus pressure occurs when the tissues lining your sinuses become swollen and inflamed. Afrin nasal spray decreases the swelling to provide the quickest and most effective relief from sinus pressure.      Over-the-counter medications do not require a prescription. Ask the pharmacist if you have any questions.  Self care  Steps you can take to be as comfortable as possible:     Rest.    Drink plenty of fluids.     When to seek care  Please be seen in a clinic or urgent care if any of the following occur:   New symptoms develop, or symptoms become worse   Call ahead before going to the clinic or urgent care.  Additional treatment plan   Your symptoms show that you may have coronavirus (COVID-19). This illness can cause fever, cough and trouble breathing. Many people get a mild case and get better on their own. Some people can get very sick.  What should I do?  We would like to test you for this virus.   1. Please call [phone] to schedule your visit. Explain that you were referred by [facility] to have a COVID-19 test. Be ready to share your [facility] visit ID number.  * If you need to schedule in [facility] please call [phone] or for [employer] employees please call [phone].  * If you need to schedule in the [facility] area please call [phone]. [facility] employees call [phone].  The  "following will serve as your written order for this COVID Test, ordered by me, for the indication of suspected COVID [Z20.828]: The test will be ordered in [employer], our electronic health record, after you are scheduled. It will show as ordered and authorized by En Rubio MD.  Order: COVID-19 (Coronavirus) PCR for SYMPTOMATIC [facility] from [facility].   2. When it's time for your COVID test:  Stay at least 6 feet away from others. (If someone will drive you to your test, stay in the backseat, as far away from the  as you can.)   Cover your mouth and nose with a mask, tissue or washcloth.  Go straight to the testing site. Don't make any stops on the way there or back.      3.Starting now: Stay home and away from others (self-isolate) until:   You've had no fever---and no medicine that reduces fever---for one full day (24 hours). And...   Your other symptoms have gotten better. For example, your cough or breathing has improved. And...   At least 10 days have passed since your symptoms started.       During this time, don't leave the house except for testing or medical care.   Stay in your own room, even for meals. Use your own bathroom if you can.   Stay away from others in your home. No hugging, kissing or shaking hands. No visitors.  Don't go to work, school or anywhere else.    Clean \"high touch\" surfaces often (doorknobs, counters, handles, etc.). Use a household cleaning spray or wipes. You'll find a full list of  on the EPA website: www.epa.gov/pesticide-registration/list-n-disinfectants-use-against-sars-cov-2.   Cover your mouth and nose with a mask, tissue or washcloth to avoid spreading germs.  Wash your hands and face often. Use soap and water.  Caregivers in these groups are at risk for severe illness due to COVID-19:  o People 65 years and older  o People who live in a nursing home or long-term care facility  o People with chronic disease (lung, heart, cancer, diabetes, kidney, liver, immunologic)  " o People who have a weakened immune system, including those who:   Are in cancer treatment  Take medicine that weakens the immune system, such as corticosteroids  Had a bone marrow or organ transplant  Have an immune deficiency  Have poorly controlled HIV or AIDS  Are obese (body mass index of 40 or higher)  Smoke regularly   o Caregivers should wear gloves while washing dishes, handling laundry and cleaning bedrooms and bathrooms.  o Use caution when washing and drying laundry: Don't shake dirty laundry, and use the warmest water setting that you can.  o For more tips, go to www.cdc.gov/coronavirus/2019-ncov/downloads/10Things.pdf.    4.Sign up for [employer]. We know it's scary to hear that you might have COVID-19. We want to track your symptoms to make sure you're okay over the next 2 weeks. Please look for an email from [employer]---this is a free, online program that we'll use to keep in touch. To sign up, follow the link in the email. Learn more at [email or web address]  How can I take care of myself?   Get lots of rest. Drink extra fluids (unless a doctor has told you not to).   Take Tylenol (acetaminophen) for fever or pain. If you have liver or kidney problems, ask your family doctor if it's okay to take Tylenol.   Adults can take either:    650 mg (two 325 mg pills) every 4 to 6 hours, or...   1,000 mg (two 500 mg pills) every 8 hours as needed.    Note: Don't take more than 3,000 mg in one day. Acetaminophen is found in many medicines (both prescribed and over-the-counter medicines). Read all labels to be sure you don't take too much.   For children, check the Tylenol bottle for the right dose. The dose is based on the child's age or weight.    If you have other health problems (like cancer, heart failure, an organ transplant or severe kidney disease): Call your specialty clinic if you don't feel better in the next 2 days.       Know when to call 911. Emergency warning signs include:     Trouble breathing or shortness of breath Pain or pressure in the chest that doesn't go away Feeling confused like you haven't felt before, or not being able to wake up Bluish-colored lips or face.  Where can I get more information?   [facility] -- About COVID-19: [email or web address]   CDC -- What to Do If You're Sick: www.cdc.gov/coronavirus/2019-ncov/about/steps-when-sick.html   CDC -- Ending Home Isolation: www.cdc.gov/coronavirus/2019-ncov/hcp/disposition-in-home-patients.html   CDC -- Caring for Someone: www.cdc.gov/coronavirus/2019-ncov/if-you-are-sick/care-for-someone.html   [facility] -- Interim Guidance for Hospital Discharge to Home: www.[facility].[place]/diseases/coronavirus/hcp/hospdischarge.pdf   [facility] clinical trials (COVID-19 research studies): clinicalaffairs.[facility]-clinical-trials    Below are the COVID-19 hotlines at the [facility] of Health ([facility]). Interpreters are available.    For health questions: Call 675-432-5075 or 1-270.174.5869 (7 a.m. to 7 p.m.) For questions about schools and childcare: Call 681-101-7034 or 1-669.524.6495 (7 a.m. to 7 p.m.)    Diagnosis: Fever presenting with conditions classified elsewhere  Diagnosis ICD: R50.81

## 2020-12-01 ENCOUNTER — NURSE TRIAGE (OUTPATIENT)
Dept: NURSING | Facility: CLINIC | Age: 65
End: 2020-12-01

## 2020-12-02 NOTE — TELEPHONE ENCOUNTER
Wanting appointment confirmation.    Kamala Elkins RN  [facility]      Additional Information    Health Information question, no triage required and triager able to answer question    Protocols used: INFORMATION ONLY CALL-A-AH

## 2020-12-03 RX ORDER — CEFAZOLIN SODIUM 2 G/100ML
2 INJECTION, SOLUTION INTRAVENOUS
Status: CANCELLED | OUTPATIENT
Start: 2020-12-08

## 2020-12-04 DIAGNOSIS — Z11.59 ENCOUNTER FOR SCREENING FOR OTHER VIRAL DISEASES: ICD-10-CM

## 2020-12-04 PROCEDURE — U0003 INFECTIOUS AGENT DETECTION BY NUCLEIC ACID (DNA OR RNA); SEVERE ACUTE RESPIRATORY SYNDROME CORONAVIRUS 2 (SARS-COV-2) (CORONAVIRUS DISEASE [COVID-19]), AMPLIFIED PROBE TECHNIQUE, MAKING USE OF HIGH THROUGHPUT TECHNOLOGIES AS DESCRIBED BY CMS-2020-01-R: HCPCS | Performed by: UROLOGY

## 2020-12-05 LAB
SARS-COV-2 RNA SPEC QL NAA+PROBE: NOT DETECTED
SPECIMEN SOURCE: NORMAL

## 2021-01-15 ENCOUNTER — HEALTH MAINTENANCE LETTER (OUTPATIENT)
Age: 66
End: 2021-01-15

## 2021-03-09 ENCOUNTER — TELEPHONE (OUTPATIENT)
Dept: UROLOGY | Facility: CLINIC | Age: 66
End: 2021-03-09

## 2021-03-09 NOTE — TELEPHONE ENCOUNTER
[facility] Call Center    Phone Message    May a detailed message be left on voicemail: yes     Reason for Call: Other: Pt would like to schedule an appt with Dr. Membreno to do a Rezum procedure. Pt was wondering if Dr. Membreno will do procedure if Pt prostate is 140 mm. Please call Pt to discuss and schedule at .     Action Taken: Message routed to:  [facility] Urology    Travel Screening: Not Applicable

## 2021-03-12 NOTE — TELEPHONE ENCOUNTER
I returned call to patient to let him know that the Rezum procedure would not a recommended procedure for a prostate size of 140 grams. Other procedures would be better. If patient is interested in discussing further he should schedule a Virtual visit with Dr. Membreno. I left him the telephone number to schedule if he is interested. [phone].

## 2021-03-12 NOTE — TELEPHONE ENCOUNTER
[facility] Call Center    Phone Message    May a detailed message be left on voicemail: yes     Reason for Call: Other: Pt returning Carley's call, would like a call back from her to discuss his options.     Action Taken: Message routed to:  [facility] uro    Travel Screening: Not Applicable

## 2021-03-30 ENCOUNTER — TELEPHONE (OUTPATIENT)
Dept: UROLOGY | Facility: CLINIC | Age: 66
End: 2021-03-30

## 2021-03-30 NOTE — TELEPHONE ENCOUNTER
LVM for patient to get set up to see Dr. Membreno to discuss enlarged prostate. Please take next available and offer to put him on a cancellation list

## 2021-04-15 ENCOUNTER — NURSE TRIAGE (OUTPATIENT)
Dept: NURSING | Facility: CLINIC | Age: 66
End: 2021-04-15

## 2021-04-15 ENCOUNTER — HOSPITAL ENCOUNTER (EMERGENCY)
Facility: CLINIC | Age: 66
Discharge: HOME | End: 2021-04-15
Payer: MEDICARE

## 2021-04-15 VITALS
OXYGEN SATURATION: 98 % | BODY MASS INDEX: 29.82 KG/M2 | TEMPERATURE: 97.8 F | WEIGHT: 190 LBS | SYSTOLIC BLOOD PRESSURE: 156 MMHG | RESPIRATION RATE: 18 BRPM | HEIGHT: 67 IN | HEART RATE: 111 BPM | DIASTOLIC BLOOD PRESSURE: 114 MMHG

## 2021-04-15 ASSESSMENT — MIFFLIN-ST. JEOR: SCORE: 1595.46

## 2021-04-15 NOTE — TELEPHONE ENCOUNTER
Patient calling reporting he has a large Prostate. Patient reports having constipation. Patient too magnesium citrate and reports it did not give him relief. Reports having abdominal pain.Patient reports having urinary rentention as well. States getting only drops out. Per guideline, advised patient to be seen at the emergency department.     Mario Lawrence RN  [facility]     COVID 19 Nurse Triage Plan/Patient Instructions    Please be aware that novel coronavirus (COVID-19) may be circulating in the community. If you develop symptoms such as fever, cough, or SOB or if you have concerns about the presence of another infection including coronavirus (COVID-19), please contact your health care provider or visit [email or web address].     Disposition/Instructions    ED Visit recommended. Follow protocol based instructions.     Bring Your Own Device:  Please also bring your smart device(s) (smart phones, tablets, laptops) and their charging cables for your personal use and to communicate with your care team during your visit.    Thank you for taking steps to prevent the spread of this virus.  o Limit your contact with others.  o Wear a simple mask to cover your cough.  o Wash your hands well and often.    Resources    [facility]: About COVID-19: [email or web address] [facility] [email or web address]    CDC: What to Do If You're Sick: www.cdc.gov/coronavirus/2019-ncov/about/steps-when-sick.html    CDC: Ending Home Isolation: www.cdc.gov/coronavirus/2019-ncov/hcp/disposition-in-home-patients.html     CDC: Caring for Someone: www.cdc.gov/coronavirus/2019-ncov/if-you-are-sick/care-for-someone.html     [facility]: Interim Guidance for Hospital Discharge to Home: www.health.[facility].[place]/diseases/coronavirus/hcp/hospdischarge.pdf    [facility] clinical trials (COVID-19 research studies): clinicalaffairs.[facility]/umn-clinical-trials     Below are the COVID-19 hotlines at the [place] Department of Health ([facility]).  Interpreters are available.   o For health questions: Call 989-468-5061 or 1-286.421.5853 (7 a.m. to 7 p.m.)  o For questions about schools and childcare: Call 026-017-3924 or 1-189.438.9660 (7 a.m. to 7 p.m.)                       Reason for Disposition    [1] Unable to urinate (or only a few drops) > 4 hours AND     [2] bladder feels very full (e.g., palpable bladder or strong urge to urinate)    Additional Information    Negative: Shock suspected (e.g., cold/pale/clammy skin, too weak to stand, low BP, rapid pulse)    Negative: Sounds like a life-threatening emergency to the triager    Protocols used: URINARY SYMPTOMS-A-AH

## 2021-04-16 NOTE — ED TRIAGE NOTES
Patient comes in for evaluation of urinary retention, constipation and abdominal pain. Patient states symptoms were starting today, has a history of prostate issues. States he tried to take magnesium citrate today was able to have large bowel movements after but states he still has stool that is needing to pass. ABCs intact.

## 2021-04-18 ENCOUNTER — NURSE TRIAGE (OUTPATIENT)
Dept: NURSING | Facility: CLINIC | Age: 66
End: 2021-04-18

## 2021-04-18 NOTE — TELEPHONE ENCOUNTER
Questions about recent catheterization, said catheter draining well but he is wondering if it is normal to have some sensations of needing to void. Educated. Plans to f/u as advised when catheter was placed. He was appreciative of the information and will call with any further questions.

## 2021-04-24 ENCOUNTER — NURSE TRIAGE (OUTPATIENT)
Dept: NURSING | Facility: CLINIC | Age: 66
End: 2021-04-24

## 2021-04-24 ENCOUNTER — HOSPITAL ENCOUNTER (EMERGENCY)
Facility: CLINIC | Age: 66
Discharge: HOME | End: 2021-04-24
Attending: EMERGENCY MEDICINE | Admitting: EMERGENCY MEDICINE
Payer: MEDICARE

## 2021-04-24 ENCOUNTER — APPOINTMENT (OUTPATIENT)
Dept: GENERAL RADIOLOGY | Facility: CLINIC | Age: 66
End: 2021-04-24
Attending: EMERGENCY MEDICINE
Payer: MEDICARE

## 2021-04-24 VITALS
TEMPERATURE: 98.5 F | RESPIRATION RATE: 16 BRPM | HEIGHT: 68 IN | OXYGEN SATURATION: 93 % | SYSTOLIC BLOOD PRESSURE: 140 MMHG | BODY MASS INDEX: 28.89 KG/M2 | HEART RATE: 71 BPM | DIASTOLIC BLOOD PRESSURE: 101 MMHG

## 2021-04-24 DIAGNOSIS — R07.9 CHEST PAIN, UNSPECIFIED TYPE: ICD-10-CM

## 2021-04-24 LAB
ALBUMIN SERPL-MCNC: 3.9 G/DL (ref 3.4–5)
ALP SERPL-CCNC: 72 U/L (ref 40–150)
ALT SERPL W P-5'-P-CCNC: 26 U/L (ref 0–70)
ANION GAP SERPL CALCULATED.3IONS-SCNC: 6 MMOL/L (ref 3–14)
AST SERPL W P-5'-P-CCNC: 22 U/L (ref 0–45)
BASOPHILS # BLD AUTO: 0.1 10E9/L (ref 0–0.2)
BASOPHILS NFR BLD AUTO: 0.5 %
BILIRUB SERPL-MCNC: 0.7 MG/DL (ref 0.2–1.3)
BUN SERPL-MCNC: 20 MG/DL (ref 7–30)
CALCIUM SERPL-MCNC: 8.7 MG/DL (ref 8.5–10.1)
CHLORIDE SERPL-SCNC: 105 MMOL/L (ref 94–109)
CO2 SERPL-SCNC: 27 MMOL/L (ref 20–32)
CREAT SERPL-MCNC: 1.04 MG/DL (ref 0.66–1.25)
D DIMER PPP FEU-MCNC: 0.5 UG/ML FEU (ref 0–0.5)
DIFFERENTIAL METHOD BLD: NORMAL
EOSINOPHIL # BLD AUTO: 0.7 10E9/L (ref 0–0.7)
EOSINOPHIL NFR BLD AUTO: 7.7 %
ERYTHROCYTE [DISTWIDTH] IN BLOOD BY AUTOMATED COUNT: 11.9 % (ref 10–15)
GFR SERPL CREATININE-BSD FRML MDRD: 74 ML/MIN/{1.73_M2}
GLUCOSE SERPL-MCNC: 98 MG/DL (ref 70–99)
HCT VFR BLD AUTO: 47.4 % (ref 40–53)
HGB BLD-MCNC: 16 G/DL (ref 13.3–17.7)
IMM GRANULOCYTES # BLD: 0 10E9/L (ref 0–0.4)
IMM GRANULOCYTES NFR BLD: 0.1 %
INTERPRETATION ECG - MUSE: NORMAL
LABORATORY COMMENT REPORT: NORMAL
LYMPHOCYTES # BLD AUTO: 1.2 10E9/L (ref 0.8–5.3)
LYMPHOCYTES NFR BLD AUTO: 13.1 %
MCH RBC QN AUTO: 30.2 PG (ref 26.5–33)
MCHC RBC AUTO-ENTMCNC: 33.8 G/DL (ref 31.5–36.5)
MCV RBC AUTO: 90 FL (ref 78–100)
MONOCYTES # BLD AUTO: 0.5 10E9/L (ref 0–1.3)
MONOCYTES NFR BLD AUTO: 5.1 %
NEUTROPHILS # BLD AUTO: 6.9 10E9/L (ref 1.6–8.3)
NEUTROPHILS NFR BLD AUTO: 73.5 %
NRBC # BLD AUTO: 0 10*3/UL
NRBC BLD AUTO-RTO: 0 /100
PLATELET # BLD AUTO: 268 10E9/L (ref 150–450)
POTASSIUM SERPL-SCNC: 4.1 MMOL/L (ref 3.4–5.3)
PROT SERPL-MCNC: 6.8 G/DL (ref 6.8–8.8)
RBC # BLD AUTO: 5.29 10E12/L (ref 4.4–5.9)
SARS-COV-2 RNA RESP QL NAA+PROBE: NEGATIVE
SODIUM SERPL-SCNC: 138 MMOL/L (ref 133–144)
SPECIMEN SOURCE: NORMAL
TROPONIN I SERPL-MCNC: <0.015 UG/L (ref 0–0.04)
WBC # BLD AUTO: 9.5 10E9/L (ref 4–11)

## 2021-04-24 PROCEDURE — 93005 ELECTROCARDIOGRAM TRACING: CPT

## 2021-04-24 PROCEDURE — C9803 HOPD COVID-19 SPEC COLLECT: HCPCS

## 2021-04-24 PROCEDURE — 80053 COMPREHEN METABOLIC PANEL: CPT | Performed by: EMERGENCY MEDICINE

## 2021-04-24 PROCEDURE — 99285 EMERGENCY DEPT VISIT HI MDM: CPT | Mod: 25

## 2021-04-24 PROCEDURE — 71046 X-RAY EXAM CHEST 2 VIEWS: CPT

## 2021-04-24 PROCEDURE — 85379 FIBRIN DEGRADATION QUANT: CPT | Performed by: EMERGENCY MEDICINE

## 2021-04-24 PROCEDURE — 85025 COMPLETE CBC W/AUTO DIFF WBC: CPT | Performed by: EMERGENCY MEDICINE

## 2021-04-24 PROCEDURE — 84484 ASSAY OF TROPONIN QUANT: CPT | Performed by: EMERGENCY MEDICINE

## 2021-04-24 PROCEDURE — 87635 SARS-COV-2 COVID-19 AMP PRB: CPT | Performed by: EMERGENCY MEDICINE

## 2021-04-24 ASSESSMENT — ENCOUNTER SYMPTOMS
FEVER: 0
DIARRHEA: 0
SHORTNESS OF BREATH: 1
PALPITATIONS: 1

## 2021-04-24 NOTE — TELEPHONE ENCOUNTER
"Tightness in chest  Brain fog  Breathing is difficult  Wonders if he has clogged arteries.  Feels at this time that he's on the verge of passing out.  I instructed 911.  Roni declined. Said he's going to shower and then go to [facility].  I strongly urged he call 911.  He didn't change what he's going to do. Shower then go.  I told Roni he needs someone else to drive him if he won't call 911.  \"We'll see\".    COVID 19 Nurse Triage Plan/Patient Instructions    Please be aware that novel coronavirus (COVID-19) may be circulating in the community. If you develop symptoms such as fever, cough, or SOB or if you have concerns about the presence of another infection including coronavirus (COVID-19), please contact your health care provider or visit [email or web address].     Disposition/Instructions    Call to EMS/911 recommended. Follow protocol based instructions.     Bring Your Own Device:  Please also bring your smart device(s) (smart phones, tablets, laptops) and their charging cables for your personal use and to communicate with your care team during your visit.    Thank you for taking steps to prevent the spread of this virus.  o Limit your contact with others.  o Wear a simple mask to cover your cough.  o Wash your hands well and often.    Resources    [facility]: About COVID-19: [email or web address]    CDC: What to Do If You're Sick: www.cdc.gov/coronavirus/2019-ncov/about/steps-when-sick.html    CDC: Ending Home Isolation: www.cdc.gov/coronavirus/2019-ncov/hcp/disposition-in-home-patients.html     CDC: Caring for Someone: www.cdc.gov/coronavirus/2019-ncov/if-you-are-sick/care-for-someone.html     [facility]: Interim Guidance for Hospital Discharge to Home: www.health.[facility].[place]/diseases/coronavirus/hcp/hospdischarge.pdf    [facility] clinical trials (COVID-19 research studies): clinicalaffairs.[facility]-clinical-trials     Below are the COVID-19 hotlines at the [place] " Department of Health ([facility]). Interpreters are available.   o For health questions: Call 923-245-3774 or 1-502.471.3905 (7 a.m. to 7 p.m.)  o For questions about schools and childcare: Call 805-723-1179 or 1-305.465.3275 (7 a.m. to 7 p.m.)       Reason for Disposition    [1] Chest pain lasts > 5 minutes AND [2] history of heart disease  (i.e., heart attack, bypass surgery, angina, angioplasty, CHF; not just a heart murmur)    Additional Information    Negative: Severe difficulty breathing (e.g., struggling for each breath, speaks in single words)    Negative: Difficult to awaken or acting confused (e.g., disoriented, slurred speech)    Negative: Shock suspected (e.g., cold/pale/clammy skin, too weak to stand, low BP, rapid pulse)    Protocols used: CHEST PAIN-A-PRESTON TAYLOR RN [facility]

## 2021-04-24 NOTE — ED PROVIDER NOTES
History   Chief Complaint:  Chest Pain       HPI   Roni Rebolledo is a 66 year old male with history of hypertension and anxiety who presents with chest pain. The patient states that he has had intermittent episodes of chest pain and fatigue for years. He has been worked up for his chest pain in the past and was told it was due to anxiety. He states that he has been having palpitations for the past few days along with his typical chest pain and feels like he is unable to catch his breath when laying down at night. The patient states that he has been doing some reading online about his symptoms and is concerned about clogged arteries. The patient called the nurse line about his symptoms and was told to come into the ED. He denies any fevers or diarrhea.     Review of Systems   Constitutional: Negative for fever.   Respiratory: Positive for shortness of breath.    Cardiovascular: Positive for chest pain and palpitations.   Gastrointestinal: Negative for diarrhea.   All other systems reviewed and are negative.     Allergies:  Lisinopril    Medications:  alfuzosin ER   tamsulosin   triamterene-hydrochlorothiazide    Past Medical History:    Anxiety   Ascending aortic aneurysm    Bunion   Carpal tunnel syndrome  HTN  Immunization deficiency   Lumbar radiculopathy   Onychomycosis   Urinary retention     Past Surgical History:    Arthroscopy knee with lateral meniscectomy   ACL repair   Prostate biopsy     Family History:    Brain cancer   Dementia   Coronary artery disease     Social History:  The patient presents alone.     Physical Exam     Patient Vitals for the past 24 hrs:   BP Temp Temp src Pulse Resp SpO2 Height   04/24/21 2000 (!) 140/101 -- -- 71 16 93 % --   04/24/21 1945 -- -- -- 72 25 92 % --   04/24/21 1930 (!) 156/97 -- -- 73 16 95 % --   04/24/21 1900 -- -- -- 78 20 95 % --   04/24/21 1842 -- -- -- 88 14 95 % --   04/24/21 1839 (!) 160/103 -- -- 86 9 95 % --   04/24/21 1838 (!) 177/104 98.5  F (36.9  C)  "Temporal 85 16 93 % 1.727 m (5' 8\")       Physical Exam  VS: Reviewed per above  HENT: Mucous membranes moist  EYES: sclera anicteric  CV: Rate as noted, regular rhythm.   RESP: Effort normal. Breath sounds are normal bilaterally.  GI: no tenderness/rebound/guarding, not distended.  NEURO: Alert, moving all extremities  MSK: No deformity of the extremities  SKIN: Warm and dry    Emergency Department Course     ECG:  ECG taken at 1831, ECG read at 1841  Normal sinus rhythm  Normal ECG  Rate 85 bpm. MI interval 138 ms. QRS duration 84 ms. QT/QTc 362/430 ms. P-R-T axes 42 -13 17.   No significant change when compared to EKG dated 1/18/18.     Imaging:  XR Chest 2 Views  Final Result  IMPRESSION: The cardiac silhouette and pulmonary vasculature are  within normal limits. No focal pulmonary consolidations. Stable right  lower lung scarring. No pleural effusion or pneumothorax.  HARIS NOE MD  Reading per radiology     Laboratory:  Asymptomatic COVID-19 Virus (Coronavirus) by PCR Nasopharyngeal swab: negative     CBC:  WBC 9.5, HGB 16.0, , o/w WNL      CMP: AWNL (Creatinine 1.04)     Troponin(1841):  <0.015      D dimer quantitative: 0.5    Emergency Department Course:    Reviewed:  I reviewed the patient's nursing notes, vitals, past medical records, Care Everywhere.     Assessments:  1840  I performed an exam of the patient as documented above.   2020 Patient rechecked and updated.     Disposition:  Discharged to home.      Impression & Plan   Covid-19  Roni Rebolledo was evaluated during a global COVID-19 pandemic, which necessitated consideration that the patient might be at risk for infection with the SARS-CoV-2 virus that causes COVID-19.   Applicable protocols for evaluation were followed during the patient's care.   COVID-19 was considered as part of the patient's evaluation. The plan for testing is:  a test was obtained during this visit.    Medical Decision Making:  Patient presents to the ER for " evaluation of chest pain episodes and palpitations and shortness of breath. CXR did not reveal wide mediastinum and nature of pain not c/w aortic dissection. No radiographic evidence of pneumothorax nor PNA either. Hx and lack of pneumomediastinum on CXR make boerhaave's syndrome unlikely. Based on Well's and PERC criteria, PE was also deemed unlikely. ECG did not show signs of STEMI nor other specific signs of ischemia. Troponin testing was negative, thus no signs of acute MI.  Nature of pain in conjunction with reassuring ECG and negative troponin makes pericarditis and myocarditis unlikely as well.  Plan for outpatient stress testing.  Due to indwelling Hartman catheter, patient was worried about his ability to perform exercise stress testing and thus Lexiscan was ordered as outpatient.  I encouraged close primary care follow-up.  Return precautions discussed prior to discharge.      Diagnosis:    ICD-10-CM    1. Chest pain, unspecified type  R07.9 Asymptomatic SARS-CoV-2 COVID-19 Virus (Coronavirus) by PCR     NM Lexiscan stress test       Scribe Disclosure:  I, Medardo Tan, am serving as a scribe at 6:40 PM on 4/24/2021 to document services personally performed by Noe Oquendo MD based on my observations and the provider's statements to me.        Noe Oquendo MD  04/25/21 0046

## 2021-04-25 NOTE — DISCHARGE INSTRUCTIONS
Discharge Instructions  Chest Pain    You have been seen today for chest pain or discomfort.  At this time, your provider has found no signs that your chest pain is due to a serious or life-threatening condition, (or you have declined more testing and/or admission to the hospital). However, sometimes there is a serious problem that does not show up right away. Your evaluation today may not be complete and you may need further testing and evaluation.     Generally, every Emergency Department visit should have a follow-up clinic visit with either a primary or a specialty clinic/provider. Please follow-up as instructed by your emergency provider today.  Return to the Emergency Department if:  Your chest pain changes, gets worse, starts to happen more often, or comes with less activity.  You are newly short of breath.  You get very weak or tired.  You pass out or faint.  You have any new symptoms, like fever, cough, numb legs, or you cough up blood.  You have anything else that worries you.    Until you follow-up with your regular provider, please do the following:  Take one aspirin daily unless you have an allergy or are told not to by your provider.  If a stress test appointment has been made, go to the appointment.  If you have questions, contact your regular provider.  Follow-up with your regular provider/clinic as directed; this is very important.    If you were given a prescription for medicine here today, be sure to read all of the information (including the package insert) that comes with your prescription.  This will include important information about the medicine, its side effects, and any warnings that you need to know about.  The pharmacist who fills the prescription can provide more information and answer questions you may have about the medicine.  If you have questions or concerns that the pharmacist cannot address, please call or return to the Emergency Department.       Remember that you can always come  back to the Emergency Department if you are not able to see your regular provider in the amount of time listed above, if you get any new symptoms, or if there is anything that worries you.  Discharge Instructions  Palpitations    Palpitations are an unusual awareness of your heartbeat. People often describe this as the heart skipping, fluttering, racing, irregular, or pounding. At this time, your provider has found no signs that your palpitations are due to a serious or life-threatening condition. However, sometimes there is a serious problem that does not show up right away.    Palpitations can be caused by caffeine, cigarettes, diet pills, energy drinks or supplements, other stimulants, and medications and street drugs. They can also be caused by anxiety, hormone conditions such as high thyroid, and other medical conditions. Sometimes they are a sign of abnormal rhythm in the heart. At this time, your provider did not find any dangerous cause of your symptoms.    Generally, every Emergency Department visit should have a follow-up clinic visit with either a primary or a specialty clinic/provider. Please follow-up as instructed by your emergency provider today.    Return to the Emergency Department if:  You get chest pain or tightness.  You are short of breath.  You get very weak or tired.  You pass out or faint.  Your heart rate is over 120 beats per minute for more than 10 minutes while you are resting.   You have anything else that worries you.    What can I do to help myself?  Fill any prescriptions the provider gave you and take them right away.   Follow your provider s instructions about the prescription medicines you are on. Sometimes the provider may tell you to stop taking a medicine or change the dose.  If you smoke, this may be a good time to quit! The less you can smoke, the better.  Do not use energy drinks, diet pills, or stimulants. Limit your use of caffeine.  If you were given a prescription for  medicine here today, be sure to read all of the information (including the package insert) that comes with your prescription.  This will include important information about the medicine, its side effects, and any warnings that you need to know about.  The pharmacist who fills the prescription can provide more information and answer questions you may have about the medicine.  If you have questions or concerns that the pharmacist cannot address, please call or return to the Emergency Department.     Remember that you can always come back to the Emergency Department if you are not able to see your regular provider in the amount of time listed above, if you get any new symptoms, or if there is anything that worries you.

## 2021-04-29 ENCOUNTER — TRANSFERRED RECORDS (OUTPATIENT)
Dept: HEALTH INFORMATION MANAGEMENT | Facility: CLINIC | Age: 66
End: 2021-04-29

## 2021-05-07 ENCOUNTER — HOSPITAL ENCOUNTER (OUTPATIENT)
Dept: CARDIOLOGY | Facility: CLINIC | Age: 66
Setting detail: NUCLEAR MEDICINE
End: 2021-05-07
Attending: EMERGENCY MEDICINE
Payer: MEDICARE

## 2021-05-07 ENCOUNTER — HOSPITAL ENCOUNTER (OUTPATIENT)
Dept: CARDIOLOGY | Facility: CLINIC | Age: 66
End: 2021-05-07
Attending: EMERGENCY MEDICINE
Payer: MEDICARE

## 2021-05-07 ENCOUNTER — NURSE TRIAGE (OUTPATIENT)
Dept: NURSING | Facility: CLINIC | Age: 66
End: 2021-05-07

## 2021-05-07 VITALS
HEIGHT: 68 IN | DIASTOLIC BLOOD PRESSURE: 70 MMHG | OXYGEN SATURATION: 95 % | BODY MASS INDEX: 28.19 KG/M2 | SYSTOLIC BLOOD PRESSURE: 110 MMHG | HEART RATE: 100 BPM | WEIGHT: 186 LBS

## 2021-05-07 DIAGNOSIS — R07.9 CHEST PAIN, UNSPECIFIED TYPE: ICD-10-CM

## 2021-05-07 LAB
CV STRESS MAX HR HE: 110
RATE PRESSURE PRODUCT: NORMAL
STRESS ECHO BASELINE DIASTOLIC HE: 70
STRESS ECHO BASELINE HR: 93
STRESS ECHO BASELINE SYSTOLIC BP: 110
STRESS ECHO CALCULATED PERCENT HR: 72 %
STRESS ECHO LAST STRESS DIASTOLIC BP: 78
STRESS ECHO LAST STRESS SYSTOLIC BP: 150
STRESS ECHO TARGET HR: 153

## 2021-05-07 PROCEDURE — G1004 CDSM NDSC: HCPCS

## 2021-05-07 PROCEDURE — 250N000011 HC RX IP 250 OP 636: Performed by: EMERGENCY MEDICINE

## 2021-05-07 PROCEDURE — 93018 CV STRESS TEST I&R ONLY: CPT | Performed by: INTERNAL MEDICINE

## 2021-05-07 PROCEDURE — A9502 TC99M TETROFOSMIN: HCPCS | Performed by: EMERGENCY MEDICINE

## 2021-05-07 PROCEDURE — 78452 HT MUSCLE IMAGE SPECT MULT: CPT | Mod: 26 | Performed by: INTERNAL MEDICINE

## 2021-05-07 PROCEDURE — 93016 CV STRESS TEST SUPVJ ONLY: CPT | Performed by: INTERNAL MEDICINE

## 2021-05-07 PROCEDURE — 343N000001 HC RX 343: Performed by: EMERGENCY MEDICINE

## 2021-05-07 RX ORDER — AMINOPHYLLINE 25 MG/ML
50-100 INJECTION, SOLUTION INTRAVENOUS
Status: DISCONTINUED | OUTPATIENT
Start: 2021-05-07 | End: 2021-05-08 | Stop reason: HOSPADM

## 2021-05-07 RX ORDER — REGADENOSON 0.08 MG/ML
0.4 INJECTION, SOLUTION INTRAVENOUS ONCE
Status: COMPLETED | OUTPATIENT
Start: 2021-05-07 | End: 2021-05-07

## 2021-05-07 RX ORDER — ALBUTEROL SULFATE 90 UG/1
2 AEROSOL, METERED RESPIRATORY (INHALATION) EVERY 5 MIN PRN
Status: DISCONTINUED | OUTPATIENT
Start: 2021-05-07 | End: 2021-05-08 | Stop reason: HOSPADM

## 2021-05-07 RX ORDER — ACYCLOVIR 200 MG/1
0-1 CAPSULE ORAL
Status: DISCONTINUED | OUTPATIENT
Start: 2021-05-07 | End: 2021-05-08 | Stop reason: HOSPADM

## 2021-05-07 RX ORDER — CAFFEINE CITRATE 20 MG/ML
60 SOLUTION INTRAVENOUS
Status: DISCONTINUED | OUTPATIENT
Start: 2021-05-07 | End: 2021-05-08 | Stop reason: HOSPADM

## 2021-05-07 RX ADMIN — TETROFOSMIN 9.37 MCI.: 1.38 INJECTION, POWDER, LYOPHILIZED, FOR SOLUTION INTRAVENOUS at 10:22

## 2021-05-07 RX ADMIN — TETROFOSMIN 3.6 MCI.: 1.38 INJECTION, POWDER, LYOPHILIZED, FOR SOLUTION INTRAVENOUS at 09:01

## 2021-05-07 RX ADMIN — REGADENOSON 0.4 MG: 0.08 INJECTION, SOLUTION INTRAVENOUS at 10:19

## 2021-05-07 ASSESSMENT — MIFFLIN-ST. JEOR: SCORE: 1593.19

## 2021-05-08 NOTE — TELEPHONE ENCOUNTER
Pt is calling.    Seen today for a stress test.   Has a mcgill catheter x 3 weeks now due to an enlarged prostate. Was supposed to be removed after a week. He chose to leave it in longer.  Now starting yesterday, he started to have urinary urgency and it leaks out around the catheter when he feels the pressure to go. He stated that he was told that this can be normal as his bladder is in spasm.   He stated that he has been leaking off and on.  No blood seen. Denies fever or pain. I advised him that if it continues to leak, especially if not related to a spasm or pressure and trying to hold it back, then he should bee seen in the ED or clinic. He verbalized understanding.      Reason for Disposition    Leakage of urine around catheter    Additional Information    Negative: Shock suspected (e.g., cold/pale/clammy skin, too weak to stand, low BP, rapid pulse)    Negative: Sounds like a life-threatening emergency to the triager    Negative: [1] Catheter was accidentally pulled-out AND [2] bright red continuous bleeding    Negative: SEVERE abdominal pain    Negative: Fever > 100.4 F (38.0 C)    Negative: [1] Drinking very little AND [2] dehydration suspected (e.g., no urine > 12 hours, very dry mouth, very lightheaded)    Negative: Patient sounds very sick or weak to the triager    Negative: Catheter was accidentally pulled-out    Negative: [1] Catheter is broken AND [2] is not usable    Negative: Bleeding around catheter (e.g., from penis or female urethra)    Negative: Lower abdominal pain or distention    Negative: [1] No urine in bag > 4 hours AND [2] catheter is not kinked    Negative: [1] Tea-colored or slightly red urine lasts > 24 hours AND [2] not cleared by increasing fluid intake    AND [3] no recent prostate or bladder surgery    Negative: [1] Cloudy urine lasts > 24 hours AND [2] not cleared by increasing fluid intake    Negative: [1] Bloody or red-colored urine  AND [2] no recent prostate or bladder surgery   (Exception: brief episode and urine now clear)    Negative: [1] Bloody or red-colored urine  AND [2] prostate or bladder surgery > 3 days (72 hours) ago    Negative: Urine smells bad    Negative: [1] Catheter is broken or cracked AND [2] is still usable    Protocols used: URINARY CATHETER SYMPTOMS AND [id number]    Yakelin Escalante RN  [facility] Nurse Advisor  5/7/2021 at 9:33 PM

## 2021-05-10 ENCOUNTER — OFFICE VISIT (OUTPATIENT)
Dept: FAMILY MEDICINE | Facility: CLINIC | Age: 66
End: 2021-05-10
Payer: MEDICARE

## 2021-05-10 VITALS
HEART RATE: 95 BPM | TEMPERATURE: 96.3 F | WEIGHT: 183 LBS | RESPIRATION RATE: 20 BRPM | DIASTOLIC BLOOD PRESSURE: 76 MMHG | OXYGEN SATURATION: 96 % | HEIGHT: 68 IN | BODY MASS INDEX: 27.74 KG/M2 | SYSTOLIC BLOOD PRESSURE: 128 MMHG

## 2021-05-10 DIAGNOSIS — R97.20 ELEVATED PROSTATE SPECIFIC ANTIGEN (PSA): ICD-10-CM

## 2021-05-10 DIAGNOSIS — R82.90 NONSPECIFIC FINDING ON EXAMINATION OF URINE: ICD-10-CM

## 2021-05-10 DIAGNOSIS — Z97.8 FOLEY CATHETER IN PLACE: ICD-10-CM

## 2021-05-10 DIAGNOSIS — I71.21 ASCENDING AORTIC ANEURYSM (H): ICD-10-CM

## 2021-05-10 DIAGNOSIS — Z51.81 MEDICATION MONITORING ENCOUNTER: ICD-10-CM

## 2021-05-10 DIAGNOSIS — I10 HYPERTENSION GOAL BP (BLOOD PRESSURE) < 140/90: ICD-10-CM

## 2021-05-10 DIAGNOSIS — Z12.5 SCREENING FOR PROSTATE CANCER: ICD-10-CM

## 2021-05-10 DIAGNOSIS — Z12.11 SPECIAL SCREENING FOR MALIGNANT NEOPLASMS, COLON: ICD-10-CM

## 2021-05-10 DIAGNOSIS — Z13.6 CARDIOVASCULAR SCREENING; LDL GOAL LESS THAN 130: ICD-10-CM

## 2021-05-10 DIAGNOSIS — N40.1 BENIGN PROSTATIC HYPERPLASIA WITH URINARY RETENTION: ICD-10-CM

## 2021-05-10 DIAGNOSIS — Z00.00 ENCOUNTER FOR MEDICARE ANNUAL WELLNESS EXAM: Primary | ICD-10-CM

## 2021-05-10 DIAGNOSIS — R33.8 BENIGN PROSTATIC HYPERPLASIA WITH URINARY RETENTION: ICD-10-CM

## 2021-05-10 LAB
ALBUMIN SERPL-MCNC: 3.6 G/DL (ref 3.4–5)
ALBUMIN UR-MCNC: >=300 MG/DL
ALP SERPL-CCNC: 66 U/L (ref 40–150)
ALT SERPL W P-5'-P-CCNC: 20 U/L (ref 0–70)
ANION GAP SERPL CALCULATED.3IONS-SCNC: 6 MMOL/L (ref 3–14)
APPEARANCE UR: ABNORMAL
AST SERPL W P-5'-P-CCNC: 14 U/L (ref 0–45)
BILIRUB SERPL-MCNC: 0.5 MG/DL (ref 0.2–1.3)
BILIRUB UR QL STRIP: NEGATIVE
BUN SERPL-MCNC: 18 MG/DL (ref 7–30)
CALCIUM SERPL-MCNC: 8.7 MG/DL (ref 8.5–10.1)
CHLORIDE SERPL-SCNC: 108 MMOL/L (ref 94–109)
CHOLEST SERPL-MCNC: 149 MG/DL
CK SERPL-CCNC: 57 U/L (ref 30–300)
CO2 SERPL-SCNC: 25 MMOL/L (ref 20–32)
COLOR UR AUTO: ABNORMAL
CREAT SERPL-MCNC: 0.92 MG/DL (ref 0.66–1.25)
ERYTHROCYTE [DISTWIDTH] IN BLOOD BY AUTOMATED COUNT: 11.7 % (ref 10–15)
GFR SERPL CREATININE-BSD FRML MDRD: 85 ML/MIN/{1.73_M2}
GLUCOSE SERPL-MCNC: 95 MG/DL (ref 70–99)
GLUCOSE UR STRIP-MCNC: NEGATIVE MG/DL
HCT VFR BLD AUTO: 48.3 % (ref 40–53)
HDLC SERPL-MCNC: 72 MG/DL
HGB BLD-MCNC: 16 G/DL (ref 13.3–17.7)
HGB UR QL STRIP: ABNORMAL
KETONES UR STRIP-MCNC: 15 MG/DL
LDLC SERPL CALC-MCNC: 65 MG/DL
LEUKOCYTE ESTERASE UR QL STRIP: ABNORMAL
MCH RBC QN AUTO: 30.1 PG (ref 26.5–33)
MCHC RBC AUTO-ENTMCNC: 33.1 G/DL (ref 31.5–36.5)
MCV RBC AUTO: 91 FL (ref 78–100)
NITRATE UR QL: NEGATIVE
NONHDLC SERPL-MCNC: 77 MG/DL
PH UR STRIP: 6 PH (ref 5–7)
PLATELET # BLD AUTO: 231 10E9/L (ref 150–450)
POTASSIUM SERPL-SCNC: 4.3 MMOL/L (ref 3.4–5.3)
PROT SERPL-MCNC: 6.8 G/DL (ref 6.8–8.8)
RBC # BLD AUTO: 5.32 10E12/L (ref 4.4–5.9)
RBC #/AREA URNS AUTO: >100 /HPF
SODIUM SERPL-SCNC: 139 MMOL/L (ref 133–144)
SOURCE: ABNORMAL
SP GR UR STRIP: >1.03 (ref 1–1.03)
TRIGL SERPL-MCNC: 58 MG/DL
TSH SERPL DL<=0.005 MIU/L-ACNC: 0.96 MU/L (ref 0.4–4)
UROBILINOGEN UR STRIP-ACNC: 0.2 EU/DL (ref 0.2–1)
WBC # BLD AUTO: 7.6 10E9/L (ref 4–11)
WBC #/AREA URNS AUTO: >100 /HPF

## 2021-05-10 PROCEDURE — 99000 SPECIMEN HANDLING OFFICE-LAB: CPT | Performed by: FAMILY MEDICINE

## 2021-05-10 PROCEDURE — 82043 UR ALBUMIN QUANTITATIVE: CPT | Performed by: FAMILY MEDICINE

## 2021-05-10 PROCEDURE — 80053 COMPREHEN METABOLIC PANEL: CPT | Performed by: FAMILY MEDICINE

## 2021-05-10 PROCEDURE — 85027 COMPLETE CBC AUTOMATED: CPT | Performed by: FAMILY MEDICINE

## 2021-05-10 PROCEDURE — 36415 COLL VENOUS BLD VENIPUNCTURE: CPT | Performed by: FAMILY MEDICINE

## 2021-05-10 PROCEDURE — 82550 ASSAY OF CK (CPK): CPT | Performed by: FAMILY MEDICINE

## 2021-05-10 PROCEDURE — 87086 URINE CULTURE/COLONY COUNT: CPT | Performed by: FAMILY MEDICINE

## 2021-05-10 PROCEDURE — 84443 ASSAY THYROID STIM HORMONE: CPT | Performed by: FAMILY MEDICINE

## 2021-05-10 PROCEDURE — 84403 ASSAY OF TOTAL TESTOSTERONE: CPT | Mod: 90 | Performed by: FAMILY MEDICINE

## 2021-05-10 PROCEDURE — G0438 PPPS, INITIAL VISIT: HCPCS | Performed by: FAMILY MEDICINE

## 2021-05-10 PROCEDURE — 84270 ASSAY OF SEX HORMONE GLOBUL: CPT | Performed by: FAMILY MEDICINE

## 2021-05-10 PROCEDURE — 87186 SC STD MICRODIL/AGAR DIL: CPT | Performed by: FAMILY MEDICINE

## 2021-05-10 PROCEDURE — 81001 URINALYSIS AUTO W/SCOPE: CPT | Performed by: FAMILY MEDICINE

## 2021-05-10 PROCEDURE — G0103 PSA SCREENING: HCPCS | Performed by: FAMILY MEDICINE

## 2021-05-10 PROCEDURE — 87088 URINE BACTERIA CULTURE: CPT | Performed by: FAMILY MEDICINE

## 2021-05-10 PROCEDURE — 80061 LIPID PANEL: CPT | Performed by: FAMILY MEDICINE

## 2021-05-10 RX ORDER — ALFUZOSIN HYDROCHLORIDE 10 MG/1
10 TABLET, EXTENDED RELEASE ORAL
COMMUNITY
Start: 2019-06-13 | End: 2021-05-10

## 2021-05-10 RX ORDER — FINASTERIDE 5 MG/1
TABLET, FILM COATED ORAL
COMMUNITY
Start: 2020-06-15 | End: 2022-11-15

## 2021-05-10 SDOH — ECONOMIC STABILITY: FOOD INSECURITY: WITHIN THE PAST 12 MONTHS, THE FOOD YOU BOUGHT JUST DIDN'T LAST AND YOU DIDN'T HAVE MONEY TO GET MORE.: NOT ASKED

## 2021-05-10 SDOH — ECONOMIC STABILITY: FOOD INSECURITY: WITHIN THE PAST 12 MONTHS, YOU WORRIED THAT YOUR FOOD WOULD RUN OUT BEFORE YOU GOT MONEY TO BUY MORE.: NOT ASKED

## 2021-05-10 SDOH — ECONOMIC STABILITY: INCOME INSECURITY: HOW HARD IS IT FOR YOU TO PAY FOR THE VERY BASICS LIKE FOOD, HOUSING, MEDICAL CARE, AND HEATING?: NOT ASKED

## 2021-05-10 SDOH — HEALTH STABILITY: MENTAL HEALTH: HOW OFTEN DO YOU HAVE 6 OR MORE DRINKS ON ONE OCCASION?: NOT ASKED

## 2021-05-10 SDOH — ECONOMIC STABILITY: TRANSPORTATION INSECURITY
IN THE PAST 12 MONTHS, HAS THE LACK OF TRANSPORTATION KEPT YOU FROM MEDICAL APPOINTMENTS OR FROM GETTING MEDICATIONS?: NOT ASKED

## 2021-05-10 SDOH — ECONOMIC STABILITY: TRANSPORTATION INSECURITY
IN THE PAST 12 MONTHS, HAS LACK OF TRANSPORTATION KEPT YOU FROM MEETINGS, WORK, OR FROM GETTING THINGS NEEDED FOR DAILY LIVING?: NOT ASKED

## 2021-05-10 SDOH — HEALTH STABILITY: MENTAL HEALTH: HOW OFTEN DO YOU HAVE A DRINK CONTAINING ALCOHOL?: NOT ASKED

## 2021-05-10 SDOH — HEALTH STABILITY: MENTAL HEALTH: HOW MANY STANDARD DRINKS CONTAINING ALCOHOL DO YOU HAVE ON A TYPICAL DAY?: NOT ASKED

## 2021-05-10 ASSESSMENT — MIFFLIN-ST. JEOR: SCORE: 1579.58

## 2021-05-10 ASSESSMENT — ACTIVITIES OF DAILY LIVING (ADL): CURRENT_FUNCTION: NO ASSISTANCE NEEDED

## 2021-05-10 NOTE — PROGRESS NOTES
"[facility]    SUBJECTIVE    Roni Rebolledo is a 66 year old male who presents for Preventive Visit.    Patient has been advised of split billing requirements and indicates understanding: Yes     Are you in the first 12 months of your Medicare coverage?  No    Healthy Habits:    In general, how would you rate your overall health?  Good    Frequency of exercise:  None    Do you usually eat at least 4 servings of fruit and vegetables a day, include whole grains    & fiber and avoid regularly eating high fat or \"junk\" foods?  Yes    Taking medications regularly:  Yes    Barriers to taking medications:  None    Medication side effects:  None    Ability to successfully perform activities of daily living:  No assistance needed    Home Safety:  No safety concerns identified    Hearing Impairment:  No hearing concerns (ringing ears)    In the past 6 months, have you been bothered by leaking of urine? Yes    In general, how would you rate your overall mental or emotional health?  Fair      PHQ-2 Total Score:    Additional concerns today:  Yes (discuss catheter)    Do you feel safe in your environment? Yes    Have you ever done Advance Care Planning? (For example, a Health Directive, POLST, or a discussion with a medical provider or your loved ones about your wishes): No, advance care planning information given to patient to review.  Advanced care planning was discussed at today's visit.    Fall risk    Fallen 2 or more times in the past year?: No  Any fall with injury in the past year?: No    Cognitive Screening   1) Repeat 3 items (Leader, Season, Table)    2) Clock draw: NORMAL  3) 3 item recall: Recalls 2 objects   Results: NORMAL clock, 1-2 items recalled: COGNITIVE IMPAIRMENT LESS LIKELY    Mini-CogTM Copyright JOLIE Archibald. Licensed by the author for use in [facility]; reprinted with permission (yunier@.[facility]). All rights reserved.      Do you have sleep apnea, excessive snoring or daytime " drowsiness?: no    Reviewed and updated as needed this visit by clinical staff  Tobacco  Allergies  Meds   Med Hx  Surg Hx  Fam Hx  Soc Hx        Reviewed and updated as needed this visit by Provider                Social History     Tobacco Use     Smoking status: Former Smoker     Packs/day: 0.25     Years: 7.00     Pack years: 1.75     Quit date: 1/3/1980     Years since quittin.3     Smokeless tobacco: Never Used   Substance Use Topics     Alcohol use: Yes     Alcohol/week: 3.0 standard drinks     Types: 3 Standard drinks or equivalent per week     If you drink alcohol do you typically have >3 drinks per day or >7 drinks per week? No    Alcohol Use 5/10/2021   Prescreen: >3 drinks/day or >7 drinks/week? No     Current providers sharing in care for this patient include:   Patient Care Team:  Bernard Ortega MD as PCP - General (Family Medicine)  Saleem Johnson MD as MD (Orthopaedic Surgery)  Marquez Gill MD as MD (Orthopaedic Surgery)  Monico To PA-C as Assigned Neuroscience Provider  Monico To PA-C as Assigned Surgical Provider    The following health maintenance items are reviewed in Epic and correct as of today:  Health Maintenance Due   Topic Date Due     COVID-19 Vaccine (1) Never done     ZOSTER IMMUNIZATION (1 of 2) Never done     FALL RISK ASSESSMENT  Never done     Pneumococcal Vaccine: 65+ Years (1 of 1 - PPSV23) Never done     Urinary retention - Dr Rojas - Dr Gordon - prostate embolization 2021    Htn    BP Readings from Last 3 Encounters:   05/10/21 128/76   21 110/70   21 (!) 140/101     Creatinine   Date Value Ref Range Status   2021 1.04 0.66 - 1.25 mg/dL Final     GFR Estimate   Date Value Ref Range Status   2021 74 >60 mL/min/[1.73_m2] Final     Comment:     Non  GFR Calc  Starting 2018, serum creatinine based estimated GFR (eGFR) will be   calculated using the Chronic Kidney Disease Epidemiology  Collaboration   (CKD-EPI) equation.       Health Maintenance     Colonoscopy:  Due 7/2024, hx of tubular adenomas   FIT:  pending              PSA:  pending   DEXA:  NA    Health Maintenance Due   Topic Date Due     COVID-19 Vaccine (1) Never done     ZOSTER IMMUNIZATION (1 of 2) Never done     FALL RISK ASSESSMENT  Never done     Pneumococcal Vaccine: 65+ Years (1 of 1 - PPSV23) Never done       Current Problem List    Patient Active Problem List   Diagnosis     Anxiety     CARDIOVASCULAR SCREENING; LDL GOAL LESS THAN 130     Hypertension goal BP (blood pressure) < 140/90     Ascending aortic aneurysm (H)     Elevated prostate specific antigen (PSA)     Flank pain     Immunization deficiency     Overweight     Cervical radiculopathy     Lumbar radiculopathy     Carpal tunnel syndrome of left wrist     Bunion, left     Bunion, right     Onychomycosis     Benign prostatic hyperplasia with urinary retention       Past Medical History    Past Medical History:   Diagnosis Date     Anxiety 12/23/2009     Ascending aortic aneurysm (H) 05/06/2015     Benign prostatic hyperplasia with urinary retention     increased PSA     Bunion, left 05/17/2016     Bunion, right 05/17/2016     CARDIOVASCULAR SCREENING; LDL GOAL LESS THAN 130 10/31/2010     Carpal tunnel syndrome of left wrist 05/17/2016     Carpal tunnel syndrome on both sides      Cervical radiculopathy 05/17/2016     Elevated prostate specific antigen (PSA) 05/06/2015     Flank pain 05/06/2015     Hypertension goal BP (blood pressure) < 140/90 12/23/2013     Immunization deficiency 05/06/2015     Lumbar radiculopathy 05/17/2016     Onychomycosis 05/17/2016     Urinary retention        Past Surgical History    Past Surgical History:   Procedure Laterality Date     ARTHROSCOPY KNEE WITH LATERAL MENISCECTOMY  2013    left     ARTHROSCOPY SHOULDER      bilateral - rotator cuff repair - 1990's     COLONOSCOPY N/A 05/29/2015    Procedure: COLONOSCOPY;  Surgeon: Musa  Mariano FLORIAN MD;  Location:  GI     ORTHOPEDIC SURGERY      ACL repair left     PROSTATE BIOPSY, NEEDLE, SATURATION SAMPLING       SURGICAL HISTORY OF -       anal fissure       Current Medications    Current Outpatient Medications   Medication Sig Dispense Refill     alfuzosin ER (UROXATRAL) 10 MG 24 hr tablet Take 1 tablet by mouth daily  3     finasteride (PROSCAR) 5 MG tablet finasteride 5 mg tablet   TAKE 1 TABLET BY MOUTH EVERY DAY         Allergies    Allergies   Allergen Reactions     Lisinopril Cough       Immunizations    Immunization History   Administered Date(s) Administered     TD (ADULT, 7+) 1993     TDAP Vaccine (Adacel) 2017       Family History    Family History   Problem Relation Age of Onset     Other Cancer Mother         brain CA     Mental Illness Father         dementia     Coronary Artery Disease Brother      Colon Cancer No family hx of        Social History    Social History     Socioeconomic History     Marital status:      Spouse name: Kayla     Number of children: 5     Years of education: 12     Highest education level: Not on file   Occupational History     Employer: [employer]   Social Needs     Financial resource strain: Not on file     Food insecurity     Worry: Not on file     Inability: Not on file     Transportation needs     Medical: Not on file     Non-medical: Not on file   Tobacco Use     Smoking status: Former Smoker     Packs/day: 0.25     Years: 7.00     Pack years: 1.75     Quit date: 1/3/1980     Years since quittin.3     Smokeless tobacco: Never Used   Substance and Sexual Activity     Alcohol use: Yes     Alcohol/week: 3.0 standard drinks     Types: 3 Standard drinks or equivalent per week     Drug use: No     Sexual activity: Not Currently     Partners: Female   Lifestyle     Physical activity     Days per week: Not on file     Minutes per session: Not on file     Stress: Not on file   Relationships     Social  "connections     Talks on phone: Not on file     Gets together: Not on file     Attends [employer] service: Not on file     Active member of club or organization: Not on file     Attends meetings of clubs or organizations: Not on file     Relationship status: Not on file     Intimate partner violence     Fear of current or ex partner: Not on file     Emotionally abused: Not on file     Physically abused: Not on file     Forced sexual activity: Not on file   Other Topics Concern     Parent/sibling w/ CABG, MI or angioplasty before 65F 55M? No   Social History Narrative     Not on file       ROS    CONSTITUTIONAL: NEGATIVE for fever, chills, change in weight  INTEGUMENTARY/SKIN: NEGATIVE for worrisome rashes, moles or lesions  EYES: NEGATIVE for vision changes or irritation  ENT/MOUTH: NEGATIVE for ear, mouth and throat problems  RESP: NEGATIVE for significant cough or SOB  BREAST: NEGATIVE for masses, tenderness or discharge  CV: NEGATIVE for chest pain, palpitations or peripheral edema  GI: NEGATIVE for nausea, abdominal pain, heartburn, or change in bowel habits  : NEGATIVE for frequency, dysuria, or hematuria  MUSCULOSKELETAL: NEGATIVE for significant arthralgias or myalgia  NEURO: NEGATIVE for weakness, dizziness or paresthesias  ENDOCRINE: NEGATIVE for temperature intolerance, skin/hair changes  HEME: NEGATIVE for bleeding problems  PSYCHIATRIC: NEGATIVE for changes in mood or affect    OBJECTIVE    /76   Pulse 95   Temp 96.3  F (35.7  C) (Tympanic)   Resp 20   Ht 1.727 m (5' 8\")   Wt 83 kg (183 lb)   SpO2 96%   BMI 27.83 kg/m   Estimated body mass index is 27.83 kg/m  as calculated from the following:    Height as of this encounter: 1.727 m (5' 8\").    Weight as of this encounter: 83 kg (183 lb).  EXAM:   GENERAL: healthy, alert and no distress  EYES: Eyes grossly normal to inspection, PERRL and conjunctivae and sclerae normal  HENT: ear canals and TM's normal, nose and mouth without ulcers or " lesions  NECK: no adenopathy, no asymmetry, masses, or scars and thyroid normal to palpation  RESP: lungs clear to auscultation - no rales, rhonchi or wheezes  CV: regular rate and rhythm, normal S1 S2, no S3 or S4, no murmur, click or rub, no peripheral edema and peripheral pulses strong  ABDOMEN: soft, nontender, no hepatosplenomegaly, no masses and bowel sounds normal   (male): per urology  RECTAL: 2+ BPH  MS: no gross musculoskeletal defects noted, no edema  NEURO: Normal strength and tone, mentation intact and speech normal  PSYCH: mentation appears normal, affect normal/bright  LYMPH: no cervical, supraclavicular, axillary, or inguinal adenopathy    DIAGNOSTICS/PROCEDURES    Pending    ASSESSMENT      ICD-10-CM    1. Encounter for Medicare annual wellness exam  Z00.00 Comprehensive metabolic panel     Lipid panel reflex to direct LDL Fasting     CK total     CBC with platelets     TSH with free T4 reflex     UA reflex to Microscopic and Culture     Albumin Random Urine Quantitative with Creat Ratio     Prostate spec antigen screen     Fecal colorectal cancer screen FIT     JUST IN CASE     Urine Microscopic   2. Benign prostatic hyperplasia with urinary retention  N40.1 Testosterone Free and Total    R33.8    3. Elevated prostate specific antigen (PSA)  R97.20 Testosterone Free and Total   4. Hartman catheter in place  Z97.8    5. Hypertension goal BP (blood pressure) < 140/90  I10    6. Ascending aortic aneurysm (H)  I71.2    7. CARDIOVASCULAR SCREENING; LDL GOAL LESS THAN 130  Z13.6    8. Screening for prostate cancer  Z12.5 Prostate spec antigen screen     Testosterone Free and Total   9. Special screening for malignant neoplasms, colon  Z12.11 Fecal colorectal cancer screen FIT   10. Nonspecific finding on examination of urine  R82.90 Urine Culture Aerobic Bacterial   11. Medication monitoring encounter  Z51.81        PLAN    Discussed treatment/modality options, including risk and benefits, he  "desires:    advised alcohol consumption 1oz per day or less, advised aspirin 81 mg po daily, advised 1 multivitamin per day, advised calcium 7466-3563 mg/d and Vitamin D 800-1200 IU/d, advised dentist every 6 months, advised diet and exercise, advised opthalmologist every 1 years, advised self testicular exam q month, further health care maintenance, further lab(s) and observation    All diagnosis above reviewed and noted above, otherwise stable.      See [facility] orders for further details.      1) med refills prn     2) labs pending    3) declines immunizations    4) Prostate embolization 5/14/2021    Return in about 1 month (around 6/10/2021), or if symptoms worsen or fail to improve, for Annual Wellness Visit, Medication Recheck Visit.    Health Maintenance Due   Topic Date Due     COVID-19 Vaccine (1) Never done     ZOSTER IMMUNIZATION (1 of 2) Never done     FALL RISK ASSESSMENT  Never done     Pneumococcal Vaccine: 65+ Years (1 of 1 - PPSV23) Never done       End of Life Planning:  Patient currently has an advanced directive: at home    COUNSELING    Reviewed preventive health counseling, as reflected in patient instructions    Estimated body mass index is 27.83 kg/m  as calculated from the following:    Height as of this encounter: 1.727 m (5' 8\").    Weight as of this encounter: 83 kg (183 lb).         reports that he quit smoking about 41 years ago. He has a 1.75 pack-year smoking history. He has never used smokeless tobacco.      Appropriate preventive services were discussed with this patient, including applicable screening as appropriate for cardiovascular disease, diabetes, osteopenia/osteoporosis, and glaucoma.  As appropriate for age/gender, discussed screening for colorectal cancer, prostate cancer, breast cancer, and cervical cancer. Checklist reviewing preventive services available has been given to the patient.    Reviewed patients plan of care and provided an AVS. The Intermediate Care Plan ( " asthma action plan, low back pain action plan, and migraine action plan) for Roni meets the Care Plan requirement. This Care Plan has been established and reviewed with the Patient.         Bernard Ortega MD, FAAFP     [facility]  [address] [place] [id number]  [email or web address]   Office: [phone]  Fax: [phone]  Pager: [phone]

## 2021-05-10 NOTE — PATIENT INSTRUCTIONS
Patient Education   Personalized Prevention Plan  You are due for the preventive services outlined below.  Your care team is available to assist you in scheduling these services.  If you have already completed any of these items, please share that information with your care team to update in your medical record.  Health Maintenance Due   Topic Date Due     ANNUAL REVIEW OF HM ORDERS  Never done     Discuss Advance Care Planning  Never done     COVID-19 Vaccine (1) Never done     Zoster (Shingles) Vaccine (1 of 2) Never done     Annual Wellness Visit  02/11/2019     FALL RISK ASSESSMENT  Never done     Pneumococcal Vaccine (1 of 1 - PPSV23) Never done     PHQ-2  01/01/2021     Cholesterol Lab  05/17/2021

## 2021-05-11 LAB
CREAT UR-MCNC: 203 MG/DL
MICROALBUMIN UR-MCNC: 3680 MG/L
MICROALBUMIN/CREAT UR: 1812.81 MG/G CR (ref 0–17)
PSA SERPL-ACNC: 38.3 UG/L (ref 0–4)

## 2021-05-12 LAB
SHBG SERPL-SCNC: 54 NMOL/L (ref 11–80)
TESTOST FREE SERPL-MCNC: 7.35 NG/DL (ref 4.7–24.4)
TESTOST SERPL-MCNC: 489 NG/DL (ref 240–950)

## 2021-05-13 LAB
BACTERIA SPEC CULT: ABNORMAL
BACTERIA SPEC CULT: ABNORMAL
Lab: ABNORMAL
SPECIMEN SOURCE: ABNORMAL

## 2021-05-14 ENCOUNTER — TRANSFERRED RECORDS (OUTPATIENT)
Dept: HEALTH INFORMATION MANAGEMENT | Facility: CLINIC | Age: 66
End: 2021-05-14

## 2021-05-18 DIAGNOSIS — N40.1 BENIGN PROSTATIC HYPERPLASIA WITH URINARY RETENTION: Primary | ICD-10-CM

## 2021-05-18 DIAGNOSIS — R33.8 BENIGN PROSTATIC HYPERPLASIA WITH URINARY RETENTION: Primary | ICD-10-CM

## 2021-05-18 DIAGNOSIS — Z12.5 ENCOUNTER FOR SCREENING FOR MALIGNANT NEOPLASM OF PROSTATE: ICD-10-CM

## 2021-05-21 ENCOUNTER — TRANSFERRED RECORDS (OUTPATIENT)
Dept: HEALTH INFORMATION MANAGEMENT | Facility: CLINIC | Age: 66
End: 2021-05-21

## 2021-06-04 DIAGNOSIS — R33.8 BENIGN PROSTATIC HYPERPLASIA WITH URINARY RETENTION: ICD-10-CM

## 2021-06-04 DIAGNOSIS — Z12.5 ENCOUNTER FOR SCREENING FOR MALIGNANT NEOPLASM OF PROSTATE: ICD-10-CM

## 2021-06-04 DIAGNOSIS — N40.1 BENIGN PROSTATIC HYPERPLASIA WITH URINARY RETENTION: ICD-10-CM

## 2021-06-04 LAB
ALBUMIN UR-MCNC: NEGATIVE MG/DL
APPEARANCE UR: CLEAR
BILIRUB UR QL STRIP: NEGATIVE
COLOR UR AUTO: YELLOW
GLUCOSE UR STRIP-MCNC: NEGATIVE MG/DL
HGB UR QL STRIP: NEGATIVE
KETONES UR STRIP-MCNC: NEGATIVE MG/DL
LEUKOCYTE ESTERASE UR QL STRIP: NEGATIVE
NITRATE UR QL: NEGATIVE
PH UR STRIP: 6.5 PH (ref 5–7)
PSA SERPL-ACNC: 14.6 UG/L (ref 0–4)
RBC #/AREA URNS AUTO: NORMAL /HPF
SOURCE: NORMAL
SP GR UR STRIP: 1.02 (ref 1–1.03)
UROBILINOGEN UR STRIP-ACNC: 0.2 EU/DL (ref 0.2–1)
WBC #/AREA URNS AUTO: NORMAL /HPF

## 2021-06-04 PROCEDURE — G0103 PSA SCREENING: HCPCS | Performed by: FAMILY MEDICINE

## 2021-06-04 PROCEDURE — 87086 URINE CULTURE/COLONY COUNT: CPT | Performed by: FAMILY MEDICINE

## 2021-06-04 PROCEDURE — 36415 COLL VENOUS BLD VENIPUNCTURE: CPT | Performed by: FAMILY MEDICINE

## 2021-06-04 PROCEDURE — 81001 URINALYSIS AUTO W/SCOPE: CPT | Performed by: FAMILY MEDICINE

## 2021-06-05 LAB
BACTERIA SPEC CULT: NO GROWTH
Lab: NORMAL
SPECIMEN SOURCE: NORMAL

## 2021-07-09 ENCOUNTER — TRANSFERRED RECORDS (OUTPATIENT)
Dept: HEALTH INFORMATION MANAGEMENT | Facility: CLINIC | Age: 66
End: 2021-07-09

## 2021-08-05 ENCOUNTER — TRANSFERRED RECORDS (OUTPATIENT)
Dept: HEALTH INFORMATION MANAGEMENT | Facility: CLINIC | Age: 66
End: 2021-08-05

## 2021-10-24 ENCOUNTER — HEALTH MAINTENANCE LETTER (OUTPATIENT)
Age: 66
End: 2021-10-24

## 2022-07-05 ENCOUNTER — TRANSFERRED RECORDS (OUTPATIENT)
Dept: HEALTH INFORMATION MANAGEMENT | Facility: CLINIC | Age: 67
End: 2022-07-05

## 2022-07-31 ENCOUNTER — HEALTH MAINTENANCE LETTER (OUTPATIENT)
Age: 67
End: 2022-07-31

## 2022-10-04 ENCOUNTER — TELEPHONE (OUTPATIENT)
Dept: FAMILY MEDICINE | Facility: CLINIC | Age: 67
End: 2022-10-04

## 2022-10-04 NOTE — TELEPHONE ENCOUNTER
Reason for Call:  Appointment Request    Patient requesting this type of appt:  Prostate enlarged, Rib pain since Sept. But today it feels like he pulled a muscle. Almost like a cramp. In certain posit    Requested provider: Dr Ortega    Reason patient unable to be scheduled: No openings & he needs to be triaged first.     When does patient want to be seen/preferred time: 3-7 days    Comments: Please call & triage him & determine when he should be seen    Could we send this information to you in [facility] or would you prefer to receive a phone call?:   Patient would prefer a phone call   Okay to leave a detailed message?: Yes at Cell number on file:    Telephone Information:   Mobile [phone]       Call taken on 10/4/2022 at 11:38 AM by Mariann Mclean      Symptoms    Describe your symptoms: Prostate enlarged & also Rib pain since Sept. But today it feels like he pulled a muscle. Almost like a cramp. In certain positions.     Any pain: Yes: Rib pain     How long have you been having symptoms: Injured it in Sept. Then it went away , but today is pretty painful.       Have you been seen for this:  No    Appointment offered?: No    Triage offered?: Yes: Please call & triage him.     Home remedies tried: Unknown    Preferred Pharmacy:   [facility] #[id number] [facility] [address] [place] [address]  Phone: [phone] Fax: [phone]      Could we send this information to you in [facility] or would you prefer to receive a phone call?:   Patient would prefer a phone call   Okay to leave a detailed message?: Yes at Cell number on file:    Telephone Information:   Mobile [phone]

## 2022-10-05 NOTE — TELEPHONE ENCOUNTER
Attempt # 1-called for further assessment and to help schedule an appointment   Called Phone # [phone]       Left a non detailed voicemail to please call back and ask for any available triage nurse regarding your phone call  @ [phone].      Elizabeth ROCHA RN   [facility] Triage

## 2022-10-07 NOTE — TELEPHONE ENCOUNTER
Left message to assist in getting patient in for a sooner appointment with pcp.     Franco Gonzalez

## 2022-10-07 NOTE — TELEPHONE ENCOUNTER
PT calls back. He saw a Urologist today. His prostate is enlarged. Updated Care everywhere.      The Rib pain is less, he thinks the pain was muscular. It is resolving.   He just wants to be seen sooner so he can go to [place] for the winter. He also wants to get his lab work done, get his Testosterone checked.     He is asking for sooner appt than 2 months, for his physical exam     Please advise.

## 2022-10-15 ENCOUNTER — HEALTH MAINTENANCE LETTER (OUTPATIENT)
Age: 67
End: 2022-10-15

## 2022-10-27 ENCOUNTER — APPOINTMENT (OUTPATIENT)
Dept: GENERAL RADIOLOGY | Facility: CLINIC | Age: 67
End: 2022-10-27
Attending: EMERGENCY MEDICINE
Payer: MEDICARE

## 2022-10-27 ENCOUNTER — HOSPITAL ENCOUNTER (EMERGENCY)
Facility: CLINIC | Age: 67
Discharge: HOME | End: 2022-10-27
Attending: EMERGENCY MEDICINE | Admitting: EMERGENCY MEDICINE
Payer: MEDICARE

## 2022-10-27 ENCOUNTER — NURSE TRIAGE (OUTPATIENT)
Dept: FAMILY MEDICINE | Facility: CLINIC | Age: 67
End: 2022-10-27

## 2022-10-27 VITALS
RESPIRATION RATE: 16 BRPM | TEMPERATURE: 98.9 F | DIASTOLIC BLOOD PRESSURE: 105 MMHG | SYSTOLIC BLOOD PRESSURE: 189 MMHG | HEART RATE: 84 BPM | OXYGEN SATURATION: 100 %

## 2022-10-27 DIAGNOSIS — R07.9 CHEST PAIN, UNSPECIFIED TYPE: ICD-10-CM

## 2022-10-27 LAB
ALBUMIN SERPL-MCNC: 3.8 G/DL (ref 3.4–5)
ALP SERPL-CCNC: 58 U/L (ref 40–150)
ALT SERPL W P-5'-P-CCNC: 27 U/L (ref 0–70)
ANION GAP SERPL CALCULATED.3IONS-SCNC: 5 MMOL/L (ref 3–14)
ANION GAP SERPL CALCULATED.3IONS-SCNC: 7 MMOL/L (ref 3–14)
AST SERPL W P-5'-P-CCNC: 25 U/L (ref 0–45)
ATRIAL RATE - MUSE: 83 BPM
BASOPHILS # BLD AUTO: 0 10E3/UL (ref 0–0.2)
BASOPHILS NFR BLD AUTO: 0 %
BILIRUB SERPL-MCNC: 0.7 MG/DL (ref 0.2–1.3)
BUN SERPL-MCNC: 18 MG/DL (ref 7–30)
BUN SERPL-MCNC: 18 MG/DL (ref 7–30)
CALCIUM SERPL-MCNC: 8.8 MG/DL (ref 8.5–10.1)
CALCIUM SERPL-MCNC: 8.9 MG/DL (ref 8.5–10.1)
CHLORIDE BLD-SCNC: 105 MMOL/L (ref 94–109)
CHLORIDE BLD-SCNC: 105 MMOL/L (ref 94–109)
CO2 SERPL-SCNC: 25 MMOL/L (ref 20–32)
CO2 SERPL-SCNC: 26 MMOL/L (ref 20–32)
CREAT SERPL-MCNC: 0.75 MG/DL (ref 0.66–1.25)
CREAT SERPL-MCNC: 0.8 MG/DL (ref 0.66–1.25)
D DIMER PPP FEU-MCNC: 0.5 UG/ML FEU (ref 0–0.5)
DIASTOLIC BLOOD PRESSURE - MUSE: NORMAL MMHG
EOSINOPHIL # BLD AUTO: 0.1 10E3/UL (ref 0–0.7)
EOSINOPHIL NFR BLD AUTO: 1 %
ERYTHROCYTE [DISTWIDTH] IN BLOOD BY AUTOMATED COUNT: 12.7 % (ref 10–15)
GFR SERPL CREATININE-BSD FRML MDRD: >90 ML/MIN/1.73M2
GFR SERPL CREATININE-BSD FRML MDRD: >90 ML/MIN/1.73M2
GLUCOSE BLD-MCNC: 128 MG/DL (ref 70–99)
GLUCOSE BLD-MCNC: 128 MG/DL (ref 70–99)
HCT VFR BLD AUTO: 46.9 % (ref 40–53)
HGB BLD-MCNC: 16 G/DL (ref 13.3–17.7)
HOLD SPECIMEN: NORMAL
IMM GRANULOCYTES # BLD: 0 10E3/UL
IMM GRANULOCYTES NFR BLD: 0 %
INTERPRETATION ECG - MUSE: NORMAL
LIPASE SERPL-CCNC: 60 U/L (ref 73–393)
LYMPHOCYTES # BLD AUTO: 1 10E3/UL (ref 0.8–5.3)
LYMPHOCYTES NFR BLD AUTO: 18 %
MCH RBC QN AUTO: 30.6 PG (ref 26.5–33)
MCHC RBC AUTO-ENTMCNC: 34.1 G/DL (ref 31.5–36.5)
MCV RBC AUTO: 90 FL (ref 78–100)
MONOCYTES # BLD AUTO: 0.4 10E3/UL (ref 0–1.3)
MONOCYTES NFR BLD AUTO: 8 %
NEUTROPHILS # BLD AUTO: 4.1 10E3/UL (ref 1.6–8.3)
NEUTROPHILS NFR BLD AUTO: 73 %
NRBC # BLD AUTO: 0 10E3/UL
NRBC BLD AUTO-RTO: 0 /100
P AXIS - MUSE: 62 DEGREES
PLATELET # BLD AUTO: 229 10E3/UL (ref 150–450)
POTASSIUM BLD-SCNC: 4.1 MMOL/L (ref 3.4–5.3)
POTASSIUM BLD-SCNC: 4.1 MMOL/L (ref 3.4–5.3)
PR INTERVAL - MUSE: 138 MS
PROT SERPL-MCNC: 6.9 G/DL (ref 6.8–8.8)
QRS DURATION - MUSE: 82 MS
QT - MUSE: 358 MS
QTC - MUSE: 420 MS
R AXIS - MUSE: 15 DEGREES
RBC # BLD AUTO: 5.23 10E6/UL (ref 4.4–5.9)
SODIUM SERPL-SCNC: 136 MMOL/L (ref 133–144)
SODIUM SERPL-SCNC: 137 MMOL/L (ref 133–144)
SYSTOLIC BLOOD PRESSURE - MUSE: NORMAL MMHG
T AXIS - MUSE: 35 DEGREES
TROPONIN I SERPL HS-MCNC: 8 NG/L
VENTRICULAR RATE- MUSE: 83 BPM
WBC # BLD AUTO: 5.7 10E3/UL (ref 4–11)

## 2022-10-27 PROCEDURE — 93005 ELECTROCARDIOGRAM TRACING: CPT

## 2022-10-27 PROCEDURE — 84484 ASSAY OF TROPONIN QUANT: CPT | Performed by: EMERGENCY MEDICINE

## 2022-10-27 PROCEDURE — 71046 X-RAY EXAM CHEST 2 VIEWS: CPT

## 2022-10-27 PROCEDURE — 82040 ASSAY OF SERUM ALBUMIN: CPT | Performed by: EMERGENCY MEDICINE

## 2022-10-27 PROCEDURE — 99285 EMERGENCY DEPT VISIT HI MDM: CPT | Mod: 25

## 2022-10-27 PROCEDURE — 36415 COLL VENOUS BLD VENIPUNCTURE: CPT | Performed by: EMERGENCY MEDICINE

## 2022-10-27 PROCEDURE — 82310 ASSAY OF CALCIUM: CPT | Performed by: EMERGENCY MEDICINE

## 2022-10-27 PROCEDURE — 85379 FIBRIN DEGRADATION QUANT: CPT | Performed by: EMERGENCY MEDICINE

## 2022-10-27 PROCEDURE — 85025 COMPLETE CBC W/AUTO DIFF WBC: CPT | Performed by: EMERGENCY MEDICINE

## 2022-10-27 PROCEDURE — 83690 ASSAY OF LIPASE: CPT | Performed by: EMERGENCY MEDICINE

## 2022-10-27 NOTE — ED NOTES
Rapid Assessment Note    History:   Roni Rebolledo is a 67 year old male who presents with chest pain.  The patient reports intermittent chest pain for several years however it has been more frequent in the past few weeks.  This is accompanied by an odd discomfort, almost tingling-like feeling in his entire upper body, which he says goes into his lower body as well.  He has also had intermittent shortness of breath for the past several years.  He does not have any acid reflux but in the past few months has had increased burping and gassiness.  He denies any cough, fever, or chills.    Exam:   General:  Alert, interactive  Cardiovascular:  Well perfused  Lungs:  No respiratory distress, no accessory muscle use  Neuro:  Moving all 4 extremities  Skin:  Warm, dry  Psych:  Normal affect    Plan of Care:   I evaluated the patient and developed an initial plan of care. I discussed this plan and explained that I, or one of my partners, would be returning to complete the evaluation.         I, Radha Rossi, am serving as a scribe to document services personally performed by Nixon. MD Bird, based on my observations and the provider's statements to me.    10/27/2022  [employer]    Portions of this medical record were completed by a scribe. UPON MY REVIEW AND AUTHENTICATION BY ELECTRONIC SIGNATURE, this confirms (a) I performed the applicable clinical services, and (b) the record is accurate.       Bird Alicea MD  10/27/22 [id number]

## 2022-10-27 NOTE — ED PROVIDER NOTES
History     Chief Complaint:  Chest Pain    HPI   Roni Rebolledo is a 67 year old male who presents with chest pain.  The patient states that he has been having intermittent chest pain for years, but it has been more frequent in the last few weeks.  He does have some intermittent shortness of breath associated with it and some other odd tingling-like feelings in his entire upper body which occasionally radiate to his lower body as well.  Additionally, he says that he has been burping quite a bit and is quite gassy, and that also has been going on for several months.  He also has some abdominal discomfort, which also has been going on for quite a few months.  He called his clinic and they recommended he come in for evaluation.  He has no other complaints such as cough, fever, chills, or any urinary complaints.      ROS:  Review of Systems   All other systems reviewed and are negative.        Allergies:  Lisinopril     Medications:    Uroxatral  Proscar    Past Medical History:    Past Medical History:   Diagnosis Date     Anxiety 12/23/2009     Ascending aortic aneurysm (H) 05/06/2015     Benign prostatic hyperplasia with urinary retention      Bunion, left 05/17/2016     Bunion, right 05/17/2016     CARDIOVASCULAR SCREENING; LDL GOAL LESS THAN 130 10/31/2010     Carpal tunnel syndrome of left wrist 05/17/2016     Carpal tunnel syndrome on both sides      Cervical radiculopathy 05/17/2016     Elevated prostate specific antigen (PSA) 05/06/2015     Flank pain 05/06/2015     Hypertension goal BP (blood pressure) < 140/90 12/23/2013     Immunization deficiency 05/06/2015     Lumbar radiculopathy 05/17/2016     Onychomycosis 05/17/2016     Urinary retention        Past Surgical History:    Past Surgical History:   Procedure Laterality Date     ARTHROSCOPY KNEE WITH LATERAL MENISCECTOMY  2013    left     ARTHROSCOPY SHOULDER      bilateral - rotator cuff repair - 1990's     COLONOSCOPY N/A 05/29/2015    Procedure:  COLONOSCOPY;  Surgeon: Mariano Vigil MD;  Location:  GI     ORTHOPEDIC SURGERY  2013    ACL repair left     PROSTATE BIOPSY, NEEDLE, SATURATION SAMPLING       SURGICAL HISTORY OF -   1999    anal fissure     SURGICAL HISTORY OF -   05/2021    Dr Rojas, prostate artery embolization        Family History:    family history includes Coronary Artery Disease in his brother; Mental Illness in his father; Other Cancer in his mother.    Social History:   reports that he quit smoking about 42 years ago. He has a 1.75 pack-year smoking history. He has never used smokeless tobacco. He reports current alcohol use of about 3.0 standard drinks per week. He reports that he does not use drugs.  PCP: Bernard Ortega     Physical Exam     Patient Vitals for the past 24 hrs:   BP Temp Temp src Pulse Resp SpO2   10/27/22 1414 (!) 189/105 98.9  F (37.2  C) Oral 84 16 97 %        Physical Exam  Nursing note and vitals reviewed.  Constitutional:  Oriented to person, place, and time. Cooperative.   HENT:   Nose:    Nose normal.   Mouth/Throat:   Facemask in place.   Eyes:    Conjunctivae normal and EOM are normal.      Pupils are equal, round, and reactive to light.   Neck:    Trachea normal.   Cardiovascular:  Normal rate, regular rhythm, normal heart sounds and normal pulses. No murmur heard.  Pulmonary/Chest:  Effort normal and breath sounds normal.   Abdominal:   Soft. Normal appearance and bowel sounds are normal.      There is some mild diffuse tenderness to palpation.      There is no rebound and no CVA tenderness.   Musculoskeletal:  Extremities atraumatic x 4.   Lymphadenopathy:  No cervical adenopathy.   Neurological:   Alert and oriented to person, place, and time. Normal strength.      No cranial nerve deficit or sensory deficit. GCS eye subscore is 4. GCS verbal subscore is 5. GCS motor subscore is 6.   Skin:    Skin is intact. No rash noted.   Psychiatric:   Normal mood and affect.      Emergency Department Course    ECG  ECG taken at 1413, ECG read at 1414  Normal sinus rhythm. Possible left atrial enlargement.    Rate 83 bpm. SC interval 138 ms. QRS duration 82 ms. QT/QTc 358/420 ms. P-R-T axes 62 15 35.     Imaging:  XR Chest 2 Views   Final Result   IMPRESSION: No acute cardiopulmonary disease.      NERIS VERDIN MD            SYSTEM ID:  [id number]         Report per radiology    Laboratory:  Labs Ordered and Resulted from Time of ED Arrival to Time of ED Departure   BASIC METABOLIC PANEL - Abnormal       Result Value    Sodium 137      Potassium 4.1      Chloride 105      Carbon Dioxide (CO2) 25      Anion Gap 7      Urea Nitrogen 18      Creatinine 0.75      Calcium 8.9      Glucose 128 (*)     GFR Estimate >90     COMPREHENSIVE METABOLIC PANEL - Abnormal    Sodium 136      Potassium 4.1      Chloride 105      Carbon Dioxide (CO2) 26      Anion Gap 5      Urea Nitrogen 18      Creatinine 0.80      Calcium 8.8      Glucose 128 (*)     Alkaline Phosphatase 58      AST 25      ALT 27      Protein Total 6.9      Albumin 3.8      Bilirubin Total 0.7      GFR Estimate >90     LIPASE - Abnormal    Lipase 60 (*)    TROPONIN I - Normal    Troponin I High Sensitivity 8     D DIMER QUANTITATIVE - Normal    D-Dimer Quantitative 0.50     CBC WITH PLATELETS AND DIFFERENTIAL    WBC Count 5.7      RBC Count 5.23      Hemoglobin 16.0      Hematocrit 46.9      MCV 90      MCH 30.6      MCHC 34.1      RDW 12.7      Platelet Count 229      % Neutrophils 73      % Lymphocytes 18      % Monocytes 8      % Eosinophils 1      % Basophils 0      % Immature Granulocytes 0      NRBCs per 100 WBC 0      Absolute Neutrophils 4.1      Absolute Lymphocytes 1.0      Absolute Monocytes 0.4      Absolute Eosinophils 0.1      Absolute Basophils 0.0      Absolute Immature Granulocytes 0.0      Absolute NRBCs 0.0              Emergency Department Course:       Reviewed:  I reviewed nursing notes, vitals, past medical history and Care  Everywhere      Interventions:  Medications - No data to display     Disposition:  The patient was discharged to home.     Impression & Plan    Medical Decision Making:  This is a 67-year-old male who came in for further evaluation of multiple complaints, with the main one being chest pain.  His EKG is unremarkable, which is reassuring given the duration and ongoing nature of his symptoms, even though they have been intermittent.  His blood work also was unremarkable, including his D-dimer.  Therefore I feel that pulmonary embolism has been sufficiently ruled out.  He then had a chest x-ray obtained as well, which is unremarkable.  At this point I feel that all life-threatening etiologies have been ruled out.  I think a lot of his symptoms are somewhat chronic in nature and certainly could be GI related.  I do not feel this is secondary to a cardiac cause or an aneurysm despite his history.  Therefore I do not feel he requires any further tests such as a CT scan or admission to the hospital.  However I did stressed the importance of close outpatient follow-up and certainly returning with any concerns or worsening symptoms.  He understands that he may require a referral to a GI specialist as well given his ongoing abdominal discomfort.  I recommended trying some over-the-counter remedies such as Maalox or Tums as well as Prilosec.  He indicated that he did not want to use any medications, but I tried to explain that those might help him if this is from heartburn or reflux.    Diagnosis:    ICD-10-CM    1. Chest pain, unspecified type  R07.9            Discharge Medications:  New Prescriptions    No medications on file        10/27/2022   Bird Alicea MD Lashkowitz, Seth H, MD  10/27/22 1824

## 2022-10-27 NOTE — TELEPHONE ENCOUNTER
"  Situation  Central scheduling calling with patient that is complaining of chest pain     Background   No showed the past Tuesday for an appointment regarding the recurrent   chest pain over the past month.     Chronic neck pain   No heart attack  No history of blood clots     Assessment   \"Dull mild pain that Goes across my chest right to left into both shoulders.\"\" \"Today the pain has been on and off through out the morning.\"   Pain lasts a couple  Seconds but comes back\"    \"Stomach burns\" but denied acid reflux or burning in middle of chest   Mild SOB at times \"some dizziness not today but happens\" rest come and goes for the last month ago.      Recommendations    Huddled with Dr. Ortega-recommends ER now, patient declined 911.  Patient verbalized locations of hospitals.   Patient agreed to plan      Reason for Disposition    Chest pain lasting longer than 5 minutes and ANY of the following:* Over 44 years old* Over 30 years old and at least one cardiac risk factor (e.g., diabetes mellitus, high blood pressure, high cholesterol, smoker, or strong family history of heart disease)* History of heart disease (i.e., angina, heart attack, heart failure, bypass surgery, takes nitroglycerin)* Pain is crushing, pressure-like, or heavy    Difficulty breathing     Mild SOB at times today    Additional Information    Negative: SEVERE difficulty breathing (e.g., struggling for each breath, speaks in single words)    Negative: Passed out (i.e., fainted, collapsed and was not responding)    Negative: Difficult to awaken or acting confused (e.g., disoriented, slurred speech)    Negative: Shock suspected (e.g., cold/pale/clammy skin, too weak to stand, low BP, rapid pulse)    Protocols used: CHEST PAIN-A-[place]    Elizabeth ROCHA RN   [facility] Triage         "

## 2022-11-01 NOTE — TELEPHONE ENCOUNTER
Called and spoke with patient.     Scheduled fasting labs and physical.     SHAUNNA PIKE RN on 11/1/2022 at 11:06 AM   [facility]

## 2022-11-01 NOTE — TELEPHONE ENCOUNTER
Labs reviewed, note reviewed, advise lipase is low, concerns would be if it is elevated, glucose was elevated (non fasting), advise CPX fasting

## 2022-11-08 PROCEDURE — 82274 ASSAY TEST FOR BLOOD FECAL: CPT

## 2022-11-09 ENCOUNTER — LAB (OUTPATIENT)
Dept: LAB | Facility: CLINIC | Age: 67
End: 2022-11-09
Payer: MEDICARE

## 2022-11-09 DIAGNOSIS — Z00.00 ROUTINE GENERAL MEDICAL EXAMINATION AT A HEALTH CARE FACILITY: ICD-10-CM

## 2022-11-09 DIAGNOSIS — Z12.11 SCREEN FOR COLON CANCER: ICD-10-CM

## 2022-11-09 DIAGNOSIS — R33.8 BENIGN PROSTATIC HYPERPLASIA WITH URINARY RETENTION: ICD-10-CM

## 2022-11-09 DIAGNOSIS — Z51.81 MEDICATION MONITORING ENCOUNTER: ICD-10-CM

## 2022-11-09 DIAGNOSIS — N40.1 BENIGN PROSTATIC HYPERPLASIA WITH URINARY RETENTION: ICD-10-CM

## 2022-11-09 DIAGNOSIS — Z12.5 SCREENING FOR PROSTATE CANCER: ICD-10-CM

## 2022-11-09 DIAGNOSIS — F41.9 ANXIETY: ICD-10-CM

## 2022-11-09 DIAGNOSIS — I10 HYPERTENSION GOAL BP (BLOOD PRESSURE) < 140/90: ICD-10-CM

## 2022-11-09 DIAGNOSIS — R10.9 FLANK PAIN: ICD-10-CM

## 2022-11-09 DIAGNOSIS — R97.20 ELEVATED PROSTATE SPECIFIC ANTIGEN (PSA): ICD-10-CM

## 2022-11-09 DIAGNOSIS — Z13.6 CARDIOVASCULAR SCREENING; LDL GOAL LESS THAN 130: ICD-10-CM

## 2022-11-09 LAB
ALBUMIN SERPL-MCNC: 3.9 G/DL (ref 3.4–5)
ALBUMIN UR-MCNC: NEGATIVE MG/DL
ALP SERPL-CCNC: 58 U/L (ref 40–150)
ALT SERPL W P-5'-P-CCNC: 21 U/L (ref 0–70)
ANION GAP SERPL CALCULATED.3IONS-SCNC: 6 MMOL/L (ref 3–14)
APPEARANCE UR: CLEAR
AST SERPL W P-5'-P-CCNC: 19 U/L (ref 0–45)
BACTERIA #/AREA URNS HPF: ABNORMAL /HPF
BILIRUB SERPL-MCNC: 0.6 MG/DL (ref 0.2–1.3)
BILIRUB UR QL STRIP: NEGATIVE
BUN SERPL-MCNC: 20 MG/DL (ref 7–30)
CALCIUM SERPL-MCNC: 9.2 MG/DL (ref 8.5–10.1)
CHLORIDE BLD-SCNC: 108 MMOL/L (ref 94–109)
CHOLEST SERPL-MCNC: 172 MG/DL
CK SERPL-CCNC: 68 U/L (ref 30–300)
CO2 SERPL-SCNC: 26 MMOL/L (ref 20–32)
COLOR UR AUTO: YELLOW
CREAT SERPL-MCNC: 0.91 MG/DL (ref 0.66–1.25)
CREAT UR-MCNC: 102 MG/DL
ERYTHROCYTE [DISTWIDTH] IN BLOOD BY AUTOMATED COUNT: 12.9 % (ref 10–15)
FASTING STATUS PATIENT QL REPORTED: YES
GFR SERPL CREATININE-BSD FRML MDRD: >90 ML/MIN/1.73M2
GLUCOSE BLD-MCNC: 91 MG/DL (ref 70–99)
GLUCOSE UR STRIP-MCNC: NEGATIVE MG/DL
HCT VFR BLD AUTO: 47.6 % (ref 40–53)
HDLC SERPL-MCNC: 104 MG/DL
HGB BLD-MCNC: 15.9 G/DL (ref 13.3–17.7)
HGB UR QL STRIP: ABNORMAL
KETONES UR STRIP-MCNC: NEGATIVE MG/DL
LDLC SERPL CALC-MCNC: 56 MG/DL
LEUKOCYTE ESTERASE UR QL STRIP: NEGATIVE
MCH RBC QN AUTO: 30.2 PG (ref 26.5–33)
MCHC RBC AUTO-ENTMCNC: 33.4 G/DL (ref 31.5–36.5)
MCV RBC AUTO: 91 FL (ref 78–100)
MICROALBUMIN UR-MCNC: 46 MG/L
MICROALBUMIN/CREAT UR: 45.1 MG/G CR (ref 0–17)
NITRATE UR QL: NEGATIVE
NONHDLC SERPL-MCNC: 68 MG/DL
PH UR STRIP: 6 [PH] (ref 5–7)
PLATELET # BLD AUTO: 236 10E3/UL (ref 150–450)
POTASSIUM BLD-SCNC: 4.4 MMOL/L (ref 3.4–5.3)
PROT SERPL-MCNC: 6.9 G/DL (ref 6.8–8.8)
PSA SERPL-MCNC: 8.38 UG/L (ref 0–4)
RBC # BLD AUTO: 5.26 10E6/UL (ref 4.4–5.9)
RBC #/AREA URNS AUTO: ABNORMAL /HPF
SODIUM SERPL-SCNC: 140 MMOL/L (ref 133–144)
SP GR UR STRIP: 1.02 (ref 1–1.03)
SQUAMOUS #/AREA URNS AUTO: ABNORMAL /LPF
TRIGL SERPL-MCNC: 59 MG/DL
TSH SERPL DL<=0.005 MIU/L-ACNC: 1.63 MU/L (ref 0.4–4)
UROBILINOGEN UR STRIP-ACNC: 0.2 E.U./DL
WBC # BLD AUTO: 4.8 10E3/UL (ref 4–11)
WBC #/AREA URNS AUTO: ABNORMAL /HPF

## 2022-11-09 PROCEDURE — 84443 ASSAY THYROID STIM HORMONE: CPT

## 2022-11-09 PROCEDURE — G0103 PSA SCREENING: HCPCS

## 2022-11-09 PROCEDURE — 36415 COLL VENOUS BLD VENIPUNCTURE: CPT

## 2022-11-09 PROCEDURE — 82550 ASSAY OF CK (CPK): CPT

## 2022-11-09 PROCEDURE — 80053 COMPREHEN METABOLIC PANEL: CPT

## 2022-11-09 PROCEDURE — 82043 UR ALBUMIN QUANTITATIVE: CPT

## 2022-11-09 PROCEDURE — 85027 COMPLETE CBC AUTOMATED: CPT

## 2022-11-09 PROCEDURE — 80061 LIPID PANEL: CPT

## 2022-11-09 PROCEDURE — 81001 URINALYSIS AUTO W/SCOPE: CPT

## 2022-11-10 LAB — HEMOCCULT STL QL IA: NEGATIVE

## 2022-11-15 ENCOUNTER — OFFICE VISIT (OUTPATIENT)
Dept: FAMILY MEDICINE | Facility: CLINIC | Age: 67
End: 2022-11-15
Payer: MEDICARE

## 2022-11-15 VITALS
OXYGEN SATURATION: 96 % | TEMPERATURE: 96.8 F | RESPIRATION RATE: 22 BRPM | BODY MASS INDEX: 28.19 KG/M2 | SYSTOLIC BLOOD PRESSURE: 154 MMHG | HEIGHT: 68 IN | DIASTOLIC BLOOD PRESSURE: 100 MMHG | WEIGHT: 186 LBS | HEART RATE: 91 BPM

## 2022-11-15 DIAGNOSIS — Z13.6 CARDIOVASCULAR SCREENING; LDL GOAL LESS THAN 130: ICD-10-CM

## 2022-11-15 DIAGNOSIS — Z12.5 SCREENING FOR PROSTATE CANCER: ICD-10-CM

## 2022-11-15 DIAGNOSIS — R33.8 BENIGN PROSTATIC HYPERPLASIA WITH URINARY RETENTION: ICD-10-CM

## 2022-11-15 DIAGNOSIS — M54.16 LUMBAR RADICULOPATHY: ICD-10-CM

## 2022-11-15 DIAGNOSIS — Z51.81 MEDICATION MONITORING ENCOUNTER: ICD-10-CM

## 2022-11-15 DIAGNOSIS — I10 HYPERTENSION GOAL BP (BLOOD PRESSURE) < 140/90: ICD-10-CM

## 2022-11-15 DIAGNOSIS — Z12.11 SCREEN FOR COLON CANCER: ICD-10-CM

## 2022-11-15 DIAGNOSIS — R97.20 ELEVATED PROSTATE SPECIFIC ANTIGEN (PSA): ICD-10-CM

## 2022-11-15 DIAGNOSIS — Z00.00 ROUTINE GENERAL MEDICAL EXAMINATION AT A HEALTH CARE FACILITY: Primary | ICD-10-CM

## 2022-11-15 DIAGNOSIS — M54.12 CERVICAL RADICULOPATHY: ICD-10-CM

## 2022-11-15 DIAGNOSIS — F41.9 ANXIETY: ICD-10-CM

## 2022-11-15 DIAGNOSIS — Z00.00 MEDICARE ANNUAL WELLNESS VISIT, SUBSEQUENT: ICD-10-CM

## 2022-11-15 DIAGNOSIS — B35.1 ONYCHOMYCOSIS: ICD-10-CM

## 2022-11-15 DIAGNOSIS — N40.1 BENIGN PROSTATIC HYPERPLASIA WITH URINARY RETENTION: ICD-10-CM

## 2022-11-15 DIAGNOSIS — I71.21 ANEURYSM OF ASCENDING AORTA WITHOUT RUPTURE (H): ICD-10-CM

## 2022-11-15 PROCEDURE — G0439 PPPS, SUBSEQ VISIT: HCPCS | Performed by: FAMILY MEDICINE

## 2022-11-15 PROCEDURE — 99214 OFFICE O/P EST MOD 30 MIN: CPT | Mod: 25 | Performed by: FAMILY MEDICINE

## 2022-11-15 ASSESSMENT — ENCOUNTER SYMPTOMS
ABDOMINAL PAIN: 1
ARTHRALGIAS: 1
DYSURIA: 1

## 2022-11-15 ASSESSMENT — ACTIVITIES OF DAILY LIVING (ADL): CURRENT_FUNCTION: NO ASSISTANCE NEEDED

## 2022-11-15 NOTE — PROGRESS NOTES
"[facility] Yo Tamayo is a 67 year old who presents for Preventive Visit.    Patient has been advised of split billing requirements and indicates understanding: Yes     Are you in the first 12 months of your Medicare coverage?  No    Healthy Habits:     In general, how would you rate your overall health?  Good    Frequency of exercise:  1 day/week    Duration of exercise:  Less than 15 minutes    Do you usually eat at least 4 servings of fruit and vegetables a day, include whole grains    & fiber and avoid regularly eating high fat or \"junk\" foods?  Yes    Taking medications regularly:  Yes    Medication side effects:  None    Ability to successfully perform activities of daily living:  No assistance needed    Home Safety:  No safety concerns identified    Hearing Impairment:  Need to ask people to speak up or repeat themselves    In the past 6 months, have you been bothered by leaking of urine?  No    In general, how would you rate your overall mental or emotional health?  Good      PHQ-2 Total Score: 0    Additional concerns today:  No    Do you feel safe in your environment? Yes    Have you ever done Advance Care Planning? (For example, a Health Directive, POLST, or a discussion with a medical provider or your loved ones about your wishes): has paperwork    Fall risk    Fallen 2 or more times in the past year?: No  Any fall with injury in the past year?: No     Cognitive Screening   1) Repeat 3 items (Leader, Season, Table)    2) Clock draw: NORMAL  3) 3 item recall: Recalls 3 objects  Results: 3 items recalled: COGNITIVE IMPAIRMENT LESS LIKELY    Mini-CogTM Copyright JOLIE Archibald. Licensed by the author for use in [facility]; reprinted with permission ([email or web address]@.[email or web address]). All rights reserved.      Do you have sleep apnea, excessive snoring or daytime drowsiness?: no    Reviewed and updated as needed this visit by clinical staff   Tobacco  Allergies  Meds   Med Hx  Surg Hx "  Fam Hx  Soc Hx      Reviewed and updated as needed this visit by Provider                 Social History     Tobacco Use     Smoking status: Former     Packs/day: 0.25     Years: 7.00     Pack years: 1.75     Types: Cigarettes     Quit date: 1/3/1980     Years since quittin.9     Smokeless tobacco: Never   Substance Use Topics     Alcohol use: Yes     Alcohol/week: 3.0 standard drinks     Types: 3 Standard drinks or equivalent per week     If you drink alcohol do you typically have >3 drinks per day or >7 drinks per week? No    Alcohol Use 11/15/2022   Prescreen: >3 drinks/day or >7 drinks/week? Yes   Prescreen: >3 drinks/day or >7 drinks/week? -   AUDIT SCORE  5     AUDIT - Alcohol Use Disorders Identification Test - Reproduced from the World Health Organization Audit 2001 (Second Edition) 11/15/2022   1.  How often do you have a drink containing alcohol? 4 or more times a week   2.  How many drinks containing alcohol do you have on a typical day when you are drinking? 1 or 2   3.  How often do you have five or more drinks on one occasion? Less than monthly   4.  How often during the last year have you found that you were not able to stop drinking once you had started? Never   5.  How often during the last year have you failed to do what was normally expected of you because of drinking? Never   6.  How often during the last year have you needed a first drink in the morning to get yourself going after a heavy drinking session? Never   7.  How often during the last year have you had a feeling of guilt or remorse after drinking? Never   8.  How often during the last year have you been unable to remember what happened the night before because of your drinking? Never   9.  Have you or someone else been injured because of your drinking? No   10. Has a relative, friend, doctor or other health care worker been concerned about your drinking or suggested you cut down? No   TOTAL SCORE 5     Current providers sharing  "in care for this patient include:     Patient Care Team:  Bernard Ortega MD as PCP - General (Family Medicine)  Saleem Johnson MD as MD (Orthopaedic Surgery)  Marquez Gill MD as MD (Orthopaedic Surgery)  Bernard Ortega MD as Assigned PCP    The following health maintenance items are reviewed in Epic and correct as of today:  Health Maintenance   Topic Date Due     COVID-19 Vaccine (1) Never done     ZOSTER IMMUNIZATION (1 of 2) Never done     Pneumococcal Vaccine: 65+ Years (1 - PCV) Never done     INFLUENZA VACCINE (1) Never done     MEDICARE ANNUAL WELLNESS VISIT  11/15/2023     FALL RISK ASSESSMENT  11/15/2023     ANNUAL REVIEW OF HM ORDERS  11/27/2023     ADVANCE CARE PLANNING  05/10/2026     DTAP/TDAP/TD IMMUNIZATION (3 - Td or Tdap) 07/07/2027     LIPID  11/09/2027     COLORECTAL CANCER SCREENING  07/02/2029     HEPATITIS C SCREENING  Completed     PHQ-2 (once per calendar year)  Completed     AORTIC ANEURYSM SCREENING (SYSTEM ASSIGNED)  Completed     IPV IMMUNIZATION  Aged Out     MENINGITIS IMMUNIZATION  Aged Out     LUNG CANCER SCREENING  Discontinued     Review of Systems   HENT: Positive for hearing loss.    Cardiovascular: Positive for chest pain.   Gastrointestinal: Positive for abdominal pain.   Genitourinary: Positive for dysuria and impotence. Negative for penile discharge.   Musculoskeletal: Positive for arthralgias.     Difficulty with increased urination, had PAE urologic procedure, which improved symptoms initially, now have recurred, difficulty with erection, Dr Briggs, urodynamics scheduled, hx of \"lazy bladder\"    PSA   Date Value Ref Range Status   06/04/2021 14.60 (H) 0 - 4 ug/L Final     Comment:     Assay Method:  Chemiluminescence using Siemens Vista analyzer     Prostate Specific Antigen Screen   Date Value Ref Range Status   11/09/2022 8.38 (H) 0.00 - 4.00 ug/L Final     Htn    BP Readings from Last 3 Encounters:   11/15/22 (!) 154/100   10/27/22 (!) 189/105   05/10/21 128/76 "     Creatinine   Date Value Ref Range Status   11/09/2022 0.91 0.66 - 1.25 mg/dL Final   05/10/2021 0.92 0.66 - 1.25 mg/dL Final     GFR Estimate   Date Value Ref Range Status   11/09/2022 >90 >60 mL/min/1.73m2 Final     Comment:     Effective December 21, 2021 eGFRcr in adults is calculated using the 2021 CKD-EPI creatinine equation which includes age and gender (Janett et al., NE, DOI: 10.1056/SLQGuv9797954)   05/10/2021 85 >60 mL/min/[1.73_m2] Final     Comment:     Non  GFR Calc  Starting 12/18/2018, serum creatinine based estimated GFR (eGFR) will be   calculated using the Chronic Kidney Disease Epidemiology Collaboration   (CKD-EPI) equation.       Lipids    Recent Labs   Lab Test 11/09/22  0757 05/10/21  1039   CHOL 172 149    72   LDL 56 65   TRIG 59 58     Hx of ascending aorta dilatation    Anxiety    Cervical and lumbar radiculopathy    Health Maintenance     Colonoscopy:  Due 7/2024   FIT:  consider              PSA:  Reviewed, improved, still elevated - to Urology   DEXA:  NA    Health Maintenance Due   Topic Date Due     COVID-19 Vaccine (1) Never done     ZOSTER IMMUNIZATION (1 of 2) Never done     Pneumococcal Vaccine: 65+ Years (1 - PCV) Never done     INFLUENZA VACCINE (1) Never done       Current Problem List    Patient Active Problem List   Diagnosis     Anxiety     CARDIOVASCULAR SCREENING; LDL GOAL LESS THAN 130     Hypertension goal BP (blood pressure) < 140/90     Aneurysm of ascending aorta without rupture     Elevated prostate specific antigen (PSA)     Flank pain     Immunization deficiency     Overweight     Cervical radiculopathy     Lumbar radiculopathy     Carpal tunnel syndrome of left wrist     Bunion, left     Bunion, right     Onychomycosis     Benign prostatic hyperplasia with urinary retention       Past Medical History    Past Medical History:   Diagnosis Date     Anxiety 12/23/2009     Ascending aortic aneurysm 05/06/2015     Benign prostatic hyperplasia  with urinary retention     increased PSA     Bunion, left 2016     Bunion, right 2016     CARDIOVASCULAR SCREENING; LDL GOAL LESS THAN 130 10/31/2010     Carpal tunnel syndrome of left wrist 2016     Carpal tunnel syndrome on both sides      Cervical radiculopathy 2016     Elevated prostate specific antigen (PSA) 2015     Flank pain 2015     Hypertension goal BP (blood pressure) < 140/90 2013     Immunization deficiency 2015     Lumbar radiculopathy 2016     Onychomycosis 2016     Urinary retention        Past Surgical History    Past Surgical History:   Procedure Laterality Date     ARTHROSCOPY KNEE WITH LATERAL MENISCECTOMY      left     ARTHROSCOPY SHOULDER      bilateral - rotator cuff repair -      COLONOSCOPY N/A 2015    Procedure: COLONOSCOPY;  Surgeon: Mariano Vigil MD;  Location:  GI     COLONOSCOPY  2019    tubular adenoma - due 5 yrs     ORTHOPEDIC SURGERY      ACL repair left     PROSTATE BIOPSY, NEEDLE, SATURATION SAMPLING       SURGICAL HISTORY OF -       anal fissure     SURGICAL HISTORY OF -   2021    Dr Rojas, prostate artery embolization       Current Medications    No current outpatient medications on file.       Allergies    Allergies   Allergen Reactions     Lisinopril Cough       Immunizations    Immunization History   Administered Date(s) Administered     TD (ADULT, 7+) 1993     TDAP Vaccine (Adacel) 2017       Family History    Family History   Problem Relation Age of Onset     Other Cancer Mother         brain CA     Dementia Father         dementia     Coronary Artery Disease Brother      Brain Cancer Daughter          at age 2     Diabetes Paternal Aunt      Colon Cancer No family hx of        Social History    Social History     Socioeconomic History     Marital status:      Spouse name: Kayla     Number of children: 6     Years of education: 12     Highest  "education level: Not on file   Occupational History     Employer: [employer]   Tobacco Use     Smoking status: Former     Packs/day: 0.25     Years: 7.00     Pack years: 1.75     Types: Cigarettes     Quit date: 1/3/1980     Years since quittin.9     Smokeless tobacco: Never   Vaping Use     Vaping Use: Never used   Substance and Sexual Activity     Alcohol use: Yes     Alcohol/week: 3.0 standard drinks     Types: 3 Standard drinks or equivalent per week     Drug use: No     Sexual activity: Not Currently     Partners: Female   Other Topics Concern     Parent/sibling w/ CABG, MI or angioplasty before 65F 55M? No   Social History Narrative     Not on file     Social Determinants of Health     Financial Resource Strain: Not on file   Food Insecurity: Not on file   Transportation Needs: Not on file   Physical Activity: Not on file   Stress: Not on file   Social Connections: Not on file   Intimate Partner Violence: Not on file   Housing Stability: Not on file       ROS    CONSTITUTIONAL: NEGATIVE for fever, chills, change in weight  INTEGUMENTARY/SKIN: NEGATIVE for worrisome rashes, moles or lesions  EYES: NEGATIVE for vision changes or irritation  ENT/MOUTH: NEGATIVE for ear, mouth and throat problems  RESP: NEGATIVE for significant cough or SOB  BREAST: NEGATIVE for masses, tenderness or discharge  CV: NEGATIVE for chest pain, palpitations or peripheral edema  GI: NEGATIVE for nausea, abdominal pain, heartburn, or change in bowel habits  : NEGATIVE for frequency, dysuria, or hematuria  MUSCULOSKELETAL: NEGATIVE for significant arthralgias or myalgia  NEURO: NEGATIVE for weakness, dizziness or paresthesias  ENDOCRINE: NEGATIVE for temperature intolerance, skin/hair changes  HEME: NEGATIVE for bleeding problems  PSYCHIATRIC: NEGATIVE for changes in mood or affect    OBJECTIVE    BP (!) 154/100   Pulse 91   Temp 96.8  F (36  C) (Tympanic)   Resp 22   Ht 1.727 m (5' 8\")   Wt 84.4 kg (186 " lb)   SpO2 96%   BMI 28.28 kg/m      EXAM:    GENERAL: healthy, alert and no distress  EYES: Eyes grossly normal to inspection, PERRL and conjunctivae and sclerae normal  HENT: ear canals and TM's normal, nose and mouth without ulcers or lesions  NECK: no adenopathy, no asymmetry, masses, or scars and thyroid normal to palpation  RESP: lungs clear to auscultation - no rales, rhonchi or wheezes  CV: regular rate and rhythm, normal S1 S2, no S3 or S4, no murmur, click or rub, no peripheral edema and peripheral pulses strong  ABDOMEN: soft, nontender, no hepatosplenomegaly, no masses and bowel sounds normal   (male): pt declines  RECTAL: pt declines  MS: no gross musculoskeletal defects noted, no edema  SKIN: no suspicious lesions or rashes  NEURO: Normal strength and tone, mentation intact and speech normal  PSYCH: mentation appears normal, affect normal/bright  LYMPH: no cervical, supraclavicular, axillary, or inguinal adenopathy    DIAGNOSTICS/PROCEDURES    Reviewed    ASSESSMENT      ICD-10-CM    1. Routine general medical examination at a health care facility  Z00.00 REVIEW OF HEALTH MAINTENANCE PROTOCOL ORDERS      2. Medicare annual wellness visit, subsequent  Z00.00 REVIEW OF HEALTH MAINTENANCE PROTOCOL ORDERS      3. Hypertension goal BP (blood pressure) < 140/90  I10 REVIEW OF HEALTH MAINTENANCE PROTOCOL ORDERS     OFFICE/OUTPT VISIT,EST,LEVL IV      4. CARDIOVASCULAR SCREENING; LDL GOAL LESS THAN 130  Z13.6 REVIEW OF HEALTH MAINTENANCE PROTOCOL ORDERS     OFFICE/OUTPT VISIT,EST,LEVL IV      5. Aneurysm of ascending aorta without rupture  I71.21 OFFICE/OUTPT VISIT,EST,LEVL IV      6. Anxiety  F41.9 REVIEW OF HEALTH MAINTENANCE PROTOCOL ORDERS     OFFICE/OUTPT VISIT,EST,LEVL IV      7. Cervical radiculopathy  M54.12 REVIEW OF HEALTH MAINTENANCE PROTOCOL ORDERS     OFFICE/OUTPT VISIT,EST,LEVL IV      8. Lumbar radiculopathy  M54.16 REVIEW OF HEALTH MAINTENANCE PROTOCOL ORDERS     OFFICE/OUTPT VISIT,EST,LEVL  IV      9. Benign prostatic hyperplasia with urinary retention  N40.1 REVIEW OF HEALTH MAINTENANCE PROTOCOL ORDERS    R33.8 OFFICE/OUTPT VISIT,EST,LEVL IV      10. Elevated prostate specific antigen (PSA)  R97.20 REVIEW OF HEALTH MAINTENANCE PROTOCOL ORDERS     OFFICE/OUTPT VISIT,EST,LEVL IV      11. Onychomycosis  B35.1 REVIEW OF HEALTH MAINTENANCE PROTOCOL ORDERS     OFFICE/OUTPT VISIT,EST,LEVL IV      12. Screen for colon cancer  Z12.11 REVIEW OF HEALTH MAINTENANCE PROTOCOL ORDERS     OFFICE/OUTPT VISIT,EST,LEVL IV      13. Screening for prostate cancer  Z12.5 REVIEW OF HEALTH MAINTENANCE PROTOCOL ORDERS     OFFICE/OUTPT VISIT,EST,LEVL IV      14. Medication monitoring encounter  Z51.81 REVIEW OF HEALTH MAINTENANCE PROTOCOL ORDERS     OFFICE/OUTPT VISIT,EST,LEVL IV          PLAN    Discussed treatment/modality options, including risk and benefits, he desires:    advised alcohol consumption 1oz per day or less, advised aspirin 81 mg po daily, advised 1 multivitamin per day, advised calcium 5562-4485 mg/d and Vitamin D 800-1200 IU/d, advised dentist every 6 months, advised diet and exercise, advised opthalmologist every 1-2 years, advised blood pressure checks next few weeks (nurse only), advised self testicular exam q month, further health care maintenance, further lab(s), immunization(s) and observation    All diagnosis above reviewed and noted above, otherwise stable.      See [facility] orders for further details.      1) advise close BP checks, low salt, regular exercise, weight loss, consider medications    2) reviewed immunizations, declines    3) consider ECHO    4) Urology follow up    Return in about 2 weeks (around 11/29/2022) for BP Recheck.    Health Maintenance Due   Topic Date Due     COVID-19 Vaccine (1) Never done     ZOSTER IMMUNIZATION (1 of 2) Never done     Pneumococcal Vaccine: 65+ Years (1 - PCV) Never done     INFLUENZA VACCINE (1) Never done       COUNSELING    Reviewed preventive health  "counseling, as reflected in patient instructions    BP Readings from Last 1 Encounters:   11/15/22 (!) 154/100     Estimated body mass index is 28.28 kg/m  as calculated from the following:    Height as of this encounter: 1.727 m (5' 8\").    Weight as of this encounter: 84.4 kg (186 lb).    Weight management plan: diet and exercise     reports that he quit smoking about 42 years ago. His smoking use included cigarettes. He has a 1.75 pack-year smoking history. He has never used smokeless tobacco.    Counseling Resources:    ATP IV Guidelines  Pooled Cohorts Equation Calculator  FRAX Risk Assessment  ICSI Preventive Guidelines  Dietary Guidelines for Americans, 2010  USDA's MyPlate  ASA Prophylaxis  Lung CA Screening           Bernard Ortega MD, FAAFP     [facility]  [address] [place] [id number]  [email or web address]   Office: [phone]  Fax: [phone]  Pager: [phone]     Identified Health Risks:  "

## 2022-11-21 DIAGNOSIS — R97.20 ELEVATED PROSTATE SPECIFIC ANTIGEN (PSA): Primary | ICD-10-CM

## 2023-06-02 ENCOUNTER — TRANSFERRED RECORDS (OUTPATIENT)
Dept: HEALTH INFORMATION MANAGEMENT | Facility: CLINIC | Age: 68
End: 2023-06-02
Payer: MEDICARE

## 2023-06-14 ENCOUNTER — TRANSFERRED RECORDS (OUTPATIENT)
Dept: HEALTH INFORMATION MANAGEMENT | Facility: CLINIC | Age: 68
End: 2023-06-14
Payer: MEDICARE

## 2023-06-28 ENCOUNTER — NURSE TRIAGE (OUTPATIENT)
Dept: FAMILY MEDICINE | Facility: CLINIC | Age: 68
End: 2023-06-28
Payer: MEDICARE

## 2023-06-28 NOTE — TELEPHONE ENCOUNTER
Nurse Triage SBAR    Is this a 2nd Level Triage? NO    Situation: Patient calling for recommendation on GI providers.      Background: Pt reports stomach issues on and off for years.     Assessment: Pt reports it feels like possibly a hernia or maybe something with his bladder. Like something is growing in his abdomin. Pain is dull and intermittent. Occasional diarrhea and maybe some tarry stools at times but not currently. No fever. Reports urinary pain but relates this to a PAE.     Protocol Recommended Disposition:   See in Office Today    Recommendation: Advised to be seen today and since no appointments advised UC, patient refuses. Appointment made for tomorrow AM.       Reason for Disposition    MODERATE pain (e.g., interferes with normal activities) that comes and goes (cramps) lasts > 24 hours  (Exception: pain with Vomiting or Diarrhea - see that Protocol)    Additional Information    Negative: Passed out (i.e., fainted, collapsed and was not responding)    Negative: Shock suspected (e.g., cold/pale/clammy skin, too weak to stand, low BP, rapid pulse)    Negative: Sounds like a life-threatening emergency to the triager    Negative: Chest pain    Negative: Pain is mainly in upper abdomen (if needed ask: 'is it mainly above the belly button?')    Negative: SEVERE abdominal pain (e.g., excruciating)    Negative: Vomiting red blood or black (coffee ground) material    Negative: Bloody, black, or tarry bowel movements  (Exception: Chronic-unchanged black-grey bowel movements and is taking iron pills or Pepto-Bismol.)    Negative: Unable to urinate (or only a few drops) and bladder feels very full    Negative: Pain in scrotum persists > 1 hour    Negative: Constant abdominal pain lasting > 2 hours    Negative: Vomiting bile (green color)    Negative: Patient sounds very sick or weak to the triager    Negative: Vomiting and abdomen looks much more swollen than usual    Negative: White of the eyes have turned  "yellow (i.e., jaundice)    Negative: Blood in urine (red, pink, or tea-colored)    Negative: Fever > 103 F (39.4 C)    Negative: Fever > 101 F (38.3 C) and over 60 years of age    Negative: Fever > 100.0 F (37.8 C) and has diabetes mellitus or a weak immune system (e.g., HIV positive, cancer chemotherapy, organ transplant, splenectomy, chronic steroids)    Negative: Fever > 100.0 F (37.8 C) and bedridden (e.g., nursing home patient, stroke, chronic illness, recovering from surgery)    Answer Assessment - Initial Assessment Questions  1. LOCATION: \"Where does it hurt?\"       Center - moves around a little maybe bladder spasms   2. RADIATION: \"Does the pain shoot anywhere else?\" (e.g., chest, back)     No  3. ONSET: \"When did the pain begin?\" (Minutes, hours or days ago)       Years   4. SUDDEN: \"Gradual or sudden onset?\"      gradual  5. PATTERN \"Does the pain come and go, or is it constant?\"     - If constant: \"Is it getting better, staying the same, or worsening?\"       (Note: Constant means the pain never goes away completely; most serious pain is constant and it progresses)      - If intermittent: \"How long does it last?\" \"Do you have pain now?\"      (Note: Intermittent means the pain goes away completely between bouts)      intermittant  6. SEVERITY: \"How bad is the pain?\"  (e.g., Scale 1-10; mild, moderate, or severe)     - MILD (1-3): doesn't interfere with normal activities, abdomen soft and not tender to touch      - MODERATE (4-7): interferes with normal activities or awakens from sleep, abdomen tender to touch      - SEVERE (8-10): excruciating pain, doubled over, unable to do any normal activities        Moderate  7. RECURRENT SYMPTOM: \"Have you ever had this type of stomach pain before?\" If Yes, ask: \"When was the last time?\" and \"What happened that time?\"       recurrent not evaluated  8. CAUSE: \"What do you think is causing the stomach pain?\"      Maybe diet  9. RELIEVING/AGGRAVATING FACTORS: \"What " "makes it better or worse?\" (e.g., movement, antacids, bowel movement)      No  10. OTHER SYMPTOMS: \"Do you have any other symptoms?\" (e.g., back pain, diarrhea, fever, urination pain, vomiting)        Urinary pain, since PAE cath    Protocols used: ABDOMINAL PAIN - MALE-A-OH      "

## 2023-06-29 ENCOUNTER — OFFICE VISIT (OUTPATIENT)
Dept: FAMILY MEDICINE | Facility: CLINIC | Age: 68
End: 2023-06-29
Payer: MEDICARE

## 2023-06-29 VITALS
DIASTOLIC BLOOD PRESSURE: 88 MMHG | HEIGHT: 66 IN | TEMPERATURE: 97.9 F | OXYGEN SATURATION: 97 % | RESPIRATION RATE: 18 BRPM | WEIGHT: 187 LBS | HEART RATE: 72 BPM | SYSTOLIC BLOOD PRESSURE: 146 MMHG | BODY MASS INDEX: 30.05 KG/M2

## 2023-06-29 DIAGNOSIS — I71.21 ANEURYSM OF ASCENDING AORTA WITHOUT RUPTURE (H): ICD-10-CM

## 2023-06-29 DIAGNOSIS — F41.9 ANXIETY: ICD-10-CM

## 2023-06-29 DIAGNOSIS — R19.8 ABDOMINAL FULLNESS: Primary | ICD-10-CM

## 2023-06-29 DIAGNOSIS — I10 HYPERTENSION GOAL BP (BLOOD PRESSURE) < 140/90: ICD-10-CM

## 2023-06-29 LAB
ALBUMIN SERPL BCG-MCNC: 4.4 G/DL (ref 3.5–5.2)
ALBUMIN UR-MCNC: 30 MG/DL
ALP SERPL-CCNC: 58 U/L (ref 40–129)
ALT SERPL W P-5'-P-CCNC: 18 U/L (ref 0–70)
AMYLASE SERPL-CCNC: 135 U/L (ref 28–100)
ANION GAP SERPL CALCULATED.3IONS-SCNC: 11 MMOL/L (ref 7–15)
APPEARANCE UR: CLEAR
AST SERPL W P-5'-P-CCNC: 25 U/L (ref 0–45)
BASOPHILS # BLD AUTO: 0 10E3/UL (ref 0–0.2)
BASOPHILS NFR BLD AUTO: 0 %
BILIRUB SERPL-MCNC: 0.4 MG/DL
BILIRUB UR QL STRIP: NEGATIVE
BUN SERPL-MCNC: 23 MG/DL (ref 8–23)
CALCIUM SERPL-MCNC: 9 MG/DL (ref 8.8–10.2)
CHLORIDE SERPL-SCNC: 104 MMOL/L (ref 98–107)
COLOR UR AUTO: YELLOW
CREAT SERPL-MCNC: 0.89 MG/DL (ref 0.67–1.17)
CRP SERPL-MCNC: <3 MG/L
DEPRECATED HCO3 PLAS-SCNC: 24 MMOL/L (ref 22–29)
EOSINOPHIL # BLD AUTO: 0.1 10E3/UL (ref 0–0.7)
EOSINOPHIL NFR BLD AUTO: 3 %
ERYTHROCYTE [DISTWIDTH] IN BLOOD BY AUTOMATED COUNT: 12.4 % (ref 10–15)
ERYTHROCYTE [SEDIMENTATION RATE] IN BLOOD BY WESTERGREN METHOD: 4 MM/HR (ref 0–20)
GFR SERPL CREATININE-BSD FRML MDRD: >90 ML/MIN/1.73M2
GLUCOSE SERPL-MCNC: 95 MG/DL (ref 70–99)
GLUCOSE UR STRIP-MCNC: NEGATIVE MG/DL
HCT VFR BLD AUTO: 45.3 % (ref 40–53)
HGB BLD-MCNC: 15.2 G/DL (ref 13.3–17.7)
HGB UR QL STRIP: ABNORMAL
HOLD SPECIMEN: NORMAL
IMM GRANULOCYTES # BLD: 0 10E3/UL
IMM GRANULOCYTES NFR BLD: 0 %
KETONES UR STRIP-MCNC: NEGATIVE MG/DL
LEUKOCYTE ESTERASE UR QL STRIP: NEGATIVE
LIPASE SERPL-CCNC: 22 U/L (ref 13–60)
LYMPHOCYTES # BLD AUTO: 1.2 10E3/UL (ref 0.8–5.3)
LYMPHOCYTES NFR BLD AUTO: 24 %
MCH RBC QN AUTO: 30.5 PG (ref 26.5–33)
MCHC RBC AUTO-ENTMCNC: 33.6 G/DL (ref 31.5–36.5)
MCV RBC AUTO: 91 FL (ref 78–100)
MONOCYTES # BLD AUTO: 0.4 10E3/UL (ref 0–1.3)
MONOCYTES NFR BLD AUTO: 9 %
NEUTROPHILS # BLD AUTO: 3.1 10E3/UL (ref 1.6–8.3)
NEUTROPHILS NFR BLD AUTO: 64 %
NITRATE UR QL: NEGATIVE
PH UR STRIP: 6 [PH] (ref 5–7)
PLATELET # BLD AUTO: 212 10E3/UL (ref 150–450)
POTASSIUM SERPL-SCNC: 4.3 MMOL/L (ref 3.4–5.3)
PROT SERPL-MCNC: 6.7 G/DL (ref 6.4–8.3)
RBC # BLD AUTO: 4.98 10E6/UL (ref 4.4–5.9)
RBC #/AREA URNS AUTO: ABNORMAL /HPF
SODIUM SERPL-SCNC: 139 MMOL/L (ref 136–145)
SP GR UR STRIP: 1.02 (ref 1–1.03)
UROBILINOGEN UR STRIP-ACNC: 0.2 E.U./DL
WBC # BLD AUTO: 4.9 10E3/UL (ref 4–11)
WBC #/AREA URNS AUTO: ABNORMAL /HPF

## 2023-06-29 PROCEDURE — 83690 ASSAY OF LIPASE: CPT | Performed by: FAMILY MEDICINE

## 2023-06-29 PROCEDURE — 85652 RBC SED RATE AUTOMATED: CPT | Performed by: FAMILY MEDICINE

## 2023-06-29 PROCEDURE — 36415 COLL VENOUS BLD VENIPUNCTURE: CPT | Performed by: FAMILY MEDICINE

## 2023-06-29 PROCEDURE — 86140 C-REACTIVE PROTEIN: CPT | Performed by: FAMILY MEDICINE

## 2023-06-29 PROCEDURE — 80053 COMPREHEN METABOLIC PANEL: CPT | Performed by: FAMILY MEDICINE

## 2023-06-29 PROCEDURE — 99214 OFFICE O/P EST MOD 30 MIN: CPT | Performed by: FAMILY MEDICINE

## 2023-06-29 PROCEDURE — 82150 ASSAY OF AMYLASE: CPT | Performed by: FAMILY MEDICINE

## 2023-06-29 PROCEDURE — 81001 URINALYSIS AUTO W/SCOPE: CPT | Performed by: FAMILY MEDICINE

## 2023-06-29 PROCEDURE — 85025 COMPLETE CBC W/AUTO DIFF WBC: CPT | Performed by: FAMILY MEDICINE

## 2023-06-29 PROCEDURE — 86364 TISS TRNSGLTMNASE EA IG CLAS: CPT | Performed by: FAMILY MEDICINE

## 2023-06-29 PROCEDURE — 86258 DGP ANTIBODY EACH IG CLASS: CPT | Performed by: FAMILY MEDICINE

## 2023-06-29 NOTE — PROGRESS NOTES
"  Assessment & Plan       ICD-10-CM    1. Abdominal fullness  R19.8 Comprehensive metabolic panel (BMP + Alb, Alk Phos, ALT, AST, Total. Bili, TP)     CBC with platelets and differential     ESR: Erythrocyte sedimentation rate     CRP, inflammation     Lipase     Amylase     UA Macroscopic with reflex to Microscopic and Culture     CT Abdomen Pelvis w/o & w Contrast     PRIMARY CARE FOLLOW-UP SCHEDULING     Deamidated Giladin Peptide Lion IgA IgG     Tissue transglutaminase lion IgA and IgG     GLIADIN LION, IGA     UA Macroscopic with reflex to Microscopic and Culture      2. Aneurysm of ascending aorta without rupture (H)  I71.21 CT Abdomen Pelvis w/o & w Contrast     PRIMARY CARE FOLLOW-UP SCHEDULING      3. Hypertension goal BP (blood pressure) < 140/90  I10 Comprehensive metabolic panel (BMP + Alb, Alk Phos, ALT, AST, Total. Bili, TP)     PRIMARY CARE FOLLOW-UP SCHEDULING      4. Anxiety  F41.9 PRIMARY CARE FOLLOW-UP SCHEDULING          Discussed treatment/modality options, including risk and benefits, he desires:    1) Labs    2) CT Abd/Pelvis    3) PT - vertigo / cervical spine    4) watch BP    5) declines all meds    All diagnosis above reviewed and noted above, otherwise stable.      See [email or web address] orders for further details.      BMI:   Estimated body mass index is 30.18 kg/m  as calculated from the following:    Height as of this encounter: 1.676 m (5' 6\").    Weight as of this encounter: 84.8 kg (187 lb).   Weight management plan: diet and exercise    Return in about 1 month (around 7/29/2023).   Follow-up Visit   Expected date:  Jul 29, 2023 (Approximate)      Follow Up Appointment Details:     Follow-up with whom?: Me    Follow-Up for what?: Acute Issue Recheck    How?: In Person    Is this an as-needed follow-up?: No                 No LOS data to display    Doing chart review, history and exam, documentation and further activities as noted.           Bernard Ortega MD, FAAFP     [facility]  Prior " [facility]  [address] [place] [address]  [email or web address]   Office: [phone]  Fax: [phone]  Pager: [phone]     Benjamín Tamayo is a 68 year old, presenting for the following health issues:        6/29/2023     9:23 AM   Additional Questions   Roomed by Elena PLUNKETT CMA     History of Present Illness       Reason for visit:  Stomach  Symptom onset:  More than a month  Symptom intensity:  Mild  Symptom progression:  Staying the same  Had these symptoms before:  Yes  Has tried/received treatment for these symptoms:  No    He eats 0-1 servings of fruits and vegetables daily.He consumes 0 sweetened beverage(s) daily.He exercises with enough effort to increase his heart rate 10 to 19 minutes per day.  He exercises with enough effort to increase his heart rate 3 or less days per week.   He is taking medications regularly.      On/Off Abdominal pain/fullness over the last few years, mid, heartburn at times, constant belching, nausea with vertigo, no vomiting, pressure sensation, not sharp, weight plus/minus 5 lbs, urination frequency with increased fluids, pain in urethra, bowels vary - mainly constipation, some diarrhea, brown mainly, occasionally dk brown, S/P PAE prostate surgery 5/2021, topical numbness sensation on left abdomen    Htn    BP Readings from Last 3 Encounters:   06/29/23 (!) 146/88   11/15/22 (!) 154/100   10/27/22 (!) 189/105     Vertigo / cervical spine pain with radiculopathy - PT    Triage    Situation: Patient calling for recommendation on GI providers.      Background: Pt reports stomach issues on and off for years.      Assessment: Pt reports it feels like possibly a hernia or maybe something with his bladder. Like something is growing in his abdomin. Pain is dull and intermittent. Occasional diarrhea and maybe some tarry stools at times but not currently. No fever. Reports urinary pain but relates this to a PAE.  "    Acute Illness  Acute illness concerns: Abdominal Pain/Stomach issues  Onset/Duration: for quite a few years now/ feels like possible hernia, also has prostate issues  Symptoms:    Stomach gurgles throughout the day, feels like something may be growing in stomach    Fever: No  Chills/Sweats: No  Headache (location?): No  Sinus Pressure: No  Conjunctivitis:  No  Ear Pain: has had ear ringing, takes some ear ringing drops  Rhinorrhea: No  Congestion: No  Sore Throat: No  Cough: no  Wheeze: No  Decreased Appetite: No  Nausea: No  Vomiting: No  Diarrhea: YES  on and off  Dysuria/Freq.: Yes had catheter for while, thinks it ay be related to that  Dysuria or Hematuria: Painful urination, related to catheter  Fatigue/Achiness: No  Sick/Strep Exposure: No  Therapies tried and outcome: None    Review of Systems   CONSTITUTIONAL: NEGATIVE for fever, chills, change in weight  INTEGUMENTARY/SKIN: NEGATIVE for worrisome rashes, moles or lesions  EYES: NEGATIVE for vision changes or irritation  ENT/MOUTH: NEGATIVE for ear, mouth and throat problems  RESP: NEGATIVE for significant cough or SOB  CV: NEGATIVE for chest pain, palpitations or peripheral edema  GI: NEGATIVE for nausea, abdominal pain, heartburn, or change in bowel habits  : NEGATIVE for frequency, dysuria, or hematuria  MUSCULOSKELETAL: NEGATIVE for significant arthralgias or myalgia  NEURO: NEGATIVE for weakness, dizziness or paresthesias  ENDOCRINE: NEGATIVE for temperature intolerance, skin/hair changes  HEME: NEGATIVE for bleeding problems  PSYCHIATRIC: NEGATIVE for changes in mood or affect      Objective    BP (!) 146/88   Pulse 72   Temp 97.9  F (36.6  C) (Tympanic)   Resp 18   Ht 1.676 m (5' 6\")   Wt 84.8 kg (187 lb)   SpO2 97%   BMI 30.18 kg/m    Body mass index is 30.18 kg/m .     Physical Exam   GENERAL: healthy, alert and no distress  EYES: Eyes grossly normal to inspection, PERRL and conjunctivae and sclerae normal  HENT: ear canals and TM's " normal, nose and mouth without ulcers or lesions  NECK: no adenopathy, no asymmetry, masses, or scars and thyroid normal to palpation  RESP: lungs clear to auscultation - no rales, rhonchi or wheezes  CV: regular rate and rhythm, normal S1 S2, no S3 or S4, no murmur, click or rub, no peripheral edema and peripheral pulses strong  ABDOMEN: soft, diffuse non specific tenderness, no acute tenderness, no hepatosplenomegaly, no masses and bowel sounds normal  MS: no gross musculoskeletal defects noted, no edema  SKIN: no suspicious lesions or rashes  NEURO: Normal strength and tone, mentation intact and speech normal - notes vertigo  PSYCH: mentation appears normal, affect normal/bright    Labs pending   CT pending

## 2023-06-30 LAB
GLIADIN IGA SER-ACNC: 0.9 U/ML
GLIADIN IGG SER-ACNC: <0.6 U/ML
TTG IGA SER-ACNC: <0.2 U/ML
TTG IGG SER-ACNC: <0.6 U/ML

## 2023-07-03 ENCOUNTER — ANCILLARY PROCEDURE (OUTPATIENT)
Dept: CT IMAGING | Facility: CLINIC | Age: 68
End: 2023-07-03
Attending: FAMILY MEDICINE
Payer: MEDICARE

## 2023-07-03 DIAGNOSIS — I71.21 ANEURYSM OF ASCENDING AORTA WITHOUT RUPTURE (H): ICD-10-CM

## 2023-07-03 DIAGNOSIS — R19.8 ABDOMINAL FULLNESS: ICD-10-CM

## 2023-07-03 DIAGNOSIS — R16.0 LIVER MASS: ICD-10-CM

## 2023-07-03 DIAGNOSIS — N30.90 CYSTITIS: ICD-10-CM

## 2023-07-03 DIAGNOSIS — D17.1 LIPOMA OF TORSO: Primary | ICD-10-CM

## 2023-07-03 DIAGNOSIS — N40.0 PROSTATE ENLARGEMENT: ICD-10-CM

## 2023-07-03 LAB — RADIOLOGIST FLAGS: NORMAL

## 2023-07-03 PROCEDURE — 250N000011 HC RX IP 250 OP 636: Mod: JZ | Performed by: FAMILY MEDICINE

## 2023-07-03 PROCEDURE — 74177 CT ABD & PELVIS W/CONTRAST: CPT | Mod: MG

## 2023-07-03 PROCEDURE — G1010 CDSM STANSON: HCPCS

## 2023-07-03 RX ORDER — IOPAMIDOL 755 MG/ML
100 INJECTION, SOLUTION INTRAVASCULAR ONCE
Status: COMPLETED | OUTPATIENT
Start: 2023-07-03 | End: 2023-07-03

## 2023-07-03 RX ADMIN — IOPAMIDOL 100 ML: 755 INJECTION, SOLUTION INTRAVENOUS at 07:21

## 2023-07-05 ENCOUNTER — TELEPHONE (OUTPATIENT)
Dept: FAMILY MEDICINE | Facility: CLINIC | Age: 68
End: 2023-07-05
Payer: MEDICARE

## 2023-07-05 DIAGNOSIS — Z98.890: Primary | ICD-10-CM

## 2023-07-05 NOTE — TELEPHONE ENCOUNTER
Patient is calling to say he needs to have an orbital xray before he can be scheduled for his MRI. He had to answer yes to the question if he ever had metal in his eye. He said he has had MRI's since then without any trouble.    He also would like to further discuss his imaging results if possible. He is in [place] until 7/12/23

## 2023-07-07 DIAGNOSIS — Z98.890: Primary | ICD-10-CM

## 2023-07-07 NOTE — TELEPHONE ENCOUNTER
Called patient and informed of orders.   Patient has scheduling #     Elizabeth ROCHA RN   [facility] Triage

## 2023-07-07 NOTE — TELEPHONE ENCOUNTER
Patient said yes to maybe having metal in his eye, so while having the MRI they need orders for an:    Orbital Scan orders need to be palced at the same time of the MRI.

## 2023-07-13 ENCOUNTER — HOSPITAL ENCOUNTER (OUTPATIENT)
Dept: MRI IMAGING | Facility: CLINIC | Age: 68
Discharge: HOME | End: 2023-07-13
Attending: FAMILY MEDICINE
Payer: MEDICARE

## 2023-07-13 ENCOUNTER — HOSPITAL ENCOUNTER (OUTPATIENT)
Dept: GENERAL RADIOLOGY | Facility: CLINIC | Age: 68
Discharge: HOME | End: 2023-07-13
Attending: NURSE PRACTITIONER
Payer: MEDICARE

## 2023-07-13 DIAGNOSIS — Z98.890: ICD-10-CM

## 2023-07-13 DIAGNOSIS — N40.0 PROSTATE ENLARGEMENT: ICD-10-CM

## 2023-07-13 DIAGNOSIS — N30.90 CYSTITIS: ICD-10-CM

## 2023-07-13 DIAGNOSIS — D17.1 LIPOMA OF TORSO: ICD-10-CM

## 2023-07-13 DIAGNOSIS — R16.0 LIVER MASS: ICD-10-CM

## 2023-07-13 PROBLEM — D17.9 LIPOMA: Status: ACTIVE | Noted: 2023-06-01

## 2023-07-13 PROCEDURE — 255N000002 HC RX 255 OP 636: Performed by: FAMILY MEDICINE

## 2023-07-13 PROCEDURE — A9585 GADOBUTROL INJECTION: HCPCS | Performed by: FAMILY MEDICINE

## 2023-07-13 PROCEDURE — 74183 MRI ABD W/O CNTR FLWD CNTR: CPT | Mod: MG

## 2023-07-13 PROCEDURE — 70030 X-RAY EYE FOR FOREIGN BODY: CPT

## 2023-07-13 RX ORDER — GADOBUTROL 604.72 MG/ML
7.5 INJECTION INTRAVENOUS ONCE
Status: COMPLETED | OUTPATIENT
Start: 2023-07-13 | End: 2023-07-13

## 2023-07-13 RX ADMIN — GADOBUTROL 7.5 ML: 604.72 INJECTION INTRAVENOUS at 10:07

## 2023-07-17 ENCOUNTER — VIRTUAL VISIT (OUTPATIENT)
Dept: FAMILY MEDICINE | Facility: CLINIC | Age: 68
End: 2023-07-17
Payer: MEDICARE

## 2023-07-17 DIAGNOSIS — N28.1 RENAL CYST: ICD-10-CM

## 2023-07-17 DIAGNOSIS — K76.9 LIVER LESION: Primary | ICD-10-CM

## 2023-07-17 DIAGNOSIS — N32.3 BLADDER DIVERTICULUM: ICD-10-CM

## 2023-07-17 DIAGNOSIS — D17.9 LIPOMA, UNSPECIFIED SITE: ICD-10-CM

## 2023-07-17 DIAGNOSIS — N40.1 BENIGN PROSTATIC HYPERPLASIA WITH LOWER URINARY TRACT SYMPTOMS, SYMPTOM DETAILS UNSPECIFIED: ICD-10-CM

## 2023-07-17 PROCEDURE — G2012 BRIEF CHECK IN BY MD/QHP: HCPCS | Mod: 95 | Performed by: FAMILY MEDICINE

## 2023-07-17 NOTE — PROGRESS NOTES
"Roni is a 68 year old who is being evaluated via a billable telephone visit.      What phone number would you like to be contacted at? [phone]  How would you like to obtain your AVS? MyChart  {PROVIDER LOCATION On-site should be selected for visits conducted from your clinic location or adjoining [facility] hospital, academic office, or other nearby [facility] building. Off-site should be selected for all other provider locations, including home:[id number]}  Distant Location (provider location):  On-site    {PROVIDER CHARTING PREFERENCE:679693}    Subjective   Roni is a 68 year old, presenting for the following health issues:  Results        6/29/2023     9:23 AM   Additional Questions   Roomed by ROCHELLE Diana     Concern - Discuss recent lab results.       {additonal problems for provider to add (Optional):714828}      Review of Systems   {ROS COMP (Optional):463961}      Objective           Vitals:  No vitals were obtained today due to virtual visit.    Physical Exam   {GENERAL APPEARANCE:50::\"healthy\",\"alert\",\"no distress\"}  PSYCH: Alert and oriented times 3; coherent speech, normal   rate and volume, able to articulate logical thoughts, able   to abstract reason, no tangential thoughts, no hallucinations   or delusions  His affect is { :9427539::\"normal\"}  RESP: No cough, no audible wheezing, able to talk in full sentences  Remainder of exam unable to be completed due to telephone visits    {Diagnostic Test Results (Optional):692105}    {AMBULATORY ATTESTATION (Optional):918875}        Phone call duration: *** minutes    "

## 2023-07-17 NOTE — PROGRESS NOTES
Roni is a 68 year old who is being evaluated via a billable telephone visit.      What phone number would you like to be contacted at? [phone] ( work phone)   How would you like to obtain your AVS? MyChart          Distant Location (provider location):  On-site    Assessment & Plan       ICD-10-CM    1. Liver lesion  K76.9 Adult GI  Referral - Consult Only      2. Renal cyst  N28.1 Adult Urology  Referral      3. Bladder diverticulum  N32.3 Adult Urology  Referral      4. Benign prostatic hyperplasia with lower urinary tract symptoms, symptom details unspecified  N40.1 Adult Urology  Referral      5. Lipoma, unspecified site  D17.9           Discussed treatment/modality options, including risk and benefits, he desires:    1)  Urology    2)  Hepatology    3) follow up when back from [place]    All diagnosis above reviewed and noted above, otherwise stable.      See [employer] orders for further details.      No follow-ups on file.    No LOS data to display    Doing chart review, history and exam, documentation and further activities as noted.           Bernard Ortega MD, FAAFP     [facility]  [address] [place] [address] [id number] [place], [place] [email or web address]   Office: [phone]  Fax: [phone]  Pager: [phone]       Subjective      Roni is a 68 year old, presenting for the following health issues:    Results        7/17/2023    11:47 AM   Additional Questions   Roomed by Karla PATINO     Concern - Follow up on lab/imaging results.    Labs    Microscopic blood in your urine   Minimally elevated amylase, with normal lipase (pancreas)    CT Abd/Pelvis    IMPRESSION:   1.  Cystitis involving a superior bladder diverticulum.  2.  Prostatomegaly.  3.  An 8.4 cm retroperitoneal lipoma. This is most likely benign. Recommend a follow-up contrast enhanced computed tomography examination of the  abdomen and pelvis in 6 months months to document stability.  4.  A 2.4 cm hepatic mass. Recommend a contrast enhanced magnetic resonance imaging examination of the liver.     MRI Liver    IMPRESSION:   1.  Nodule within the left liver at segment Abiodun has complex signal  characteristics and is technically indeterminate. Could be a complex  cystic lesion, but relative hypoenhancing solid lesion is difficult to  entirely exclude. Recommend surveillance MRI in 6-12 months to assess  for stability.  2.  Prominent bladder diverticulum.    6/29/2023    On/Off Abdominal pain/fullness over the last few years, mid, heartburn at times, constant belching, nausea with vertigo, no vomiting, pressure sensation, not sharp, weight plus/minus 5 lbs, urination frequency with increased fluids, pain in urethra, bowels vary - mainly constipation, some diarrhea, brown mainly, occasionally dk brown, S/P PAE prostate surgery 5/2021, topical numbness sensation on left abdomen    Review of Systems   CONSTITUTIONAL: NEGATIVE for fever, chills, change in weight  INTEGUMENTARY/SKIN: NEGATIVE for worrisome rashes, moles or lesions  EYES: NEGATIVE for vision changes or irritation  ENT/MOUTH: NEGATIVE for ear, mouth and throat problems  RESP: NEGATIVE for significant cough or SOB  CV: NEGATIVE for chest pain, palpitations or peripheral edema  GI: NEGATIVE for nausea, abdominal pain, heartburn, or change in bowel habits  : NEGATIVE for frequency, dysuria, or hematuria  MUSCULOSKELETAL: NEGATIVE for significant arthralgias or myalgia  NEURO: NEGATIVE for weakness, dizziness or paresthesias  ENDOCRINE: NEGATIVE for temperature intolerance, skin/hair changes  HEME: NEGATIVE for bleeding problems  PSYCHIATRIC: NEGATIVE for changes in mood or affect      Objective           Vitals:  No vitals were obtained today due to virtual visit.    Physical Exam   healthy, alert and no distress  PSYCH: Alert and oriented times 3; coherent speech, normal   rate and  volume, able to articulate logical thoughts, able   to abstract reason, no tangential thoughts, no hallucinations   or delusions  His affect is normal  RESP: No cough, no audible wheezing, able to talk in full sentences  Remainder of exam unable to be completed due to telephone visits    Reviewed recent labs/imaging      Phone call duration: 21 minutes

## 2023-07-20 ENCOUNTER — OFFICE VISIT (OUTPATIENT)
Dept: UROLOGY | Facility: CLINIC | Age: 68
End: 2023-07-20
Attending: FAMILY MEDICINE
Payer: MEDICARE

## 2023-07-20 VITALS
WEIGHT: 185 LBS | BODY MASS INDEX: 29.73 KG/M2 | DIASTOLIC BLOOD PRESSURE: 85 MMHG | HEIGHT: 66 IN | OXYGEN SATURATION: 97 % | HEART RATE: 75 BPM | SYSTOLIC BLOOD PRESSURE: 153 MMHG

## 2023-07-20 DIAGNOSIS — N32.3 BLADDER DIVERTICULUM: ICD-10-CM

## 2023-07-20 DIAGNOSIS — N28.1 RENAL CYST: ICD-10-CM

## 2023-07-20 DIAGNOSIS — N40.1 BENIGN PROSTATIC HYPERPLASIA WITH LOWER URINARY TRACT SYMPTOMS, SYMPTOM DETAILS UNSPECIFIED: ICD-10-CM

## 2023-07-20 PROCEDURE — 99205 OFFICE O/P NEW HI 60 MIN: CPT | Mod: 25 | Performed by: UROLOGY

## 2023-07-20 PROCEDURE — 51798 US URINE CAPACITY MEASURE: CPT | Performed by: UROLOGY

## 2023-07-20 RX ORDER — TAMSULOSIN HYDROCHLORIDE 0.4 MG/1
0.4 CAPSULE ORAL DAILY
Qty: 90 CAPSULE | Refills: 0 | Status: SHIPPED | OUTPATIENT
Start: 2023-07-20 | End: 2023-08-31

## 2023-07-20 ASSESSMENT — PAIN SCALES - GENERAL: PAINLEVEL: MILD PAIN (2)

## 2023-07-20 NOTE — NURSING NOTE
Active order to obtain bladder scan? Yes   Name of ordering provider:  [facility]   Bladder scan preformed post void Yes: 50ml.  Bladder scan reveled >555ML  Provider notified?  Yes    Katherine FLORES CMA

## 2023-07-20 NOTE — NURSING NOTE
"Chief Complaint   Patient presents with    Consult     Renal Cyst        Initial BP (!) 153/85 (BP Location: Right arm, Patient Position: Chair, Cuff Size: Adult Regular)   Pulse 75   Ht 1.676 m (5' 6\")   Wt 83.9 kg (185 lb)   SpO2 97%   BMI 29.86 kg/m   Estimated body mass index is 29.86 kg/m  as calculated from the following:    Height as of this encounter: 1.676 m (5' 6\").    Weight as of this encounter: 83.9 kg (185 lb).  BP completed using cuff size: regular  Medications and allergies reviewed.      Katherine FLORES CMA     "

## 2023-07-20 NOTE — PROGRESS NOTES
CC: renal cyst, bladder diverticulum, BPH    HPI: 69 yo male with history of a renal cyst, BPH s/p PAE 05/2021, and bladder diverticulum noted on imaging.  Patient has been having some abdominal bloating and discomfort which is what prompted the imaging.  He also has a history of elevated PSA and a negative prostate biopsy about 15 years ago.    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  We discussed options for managing his urination including medications, observation, CIC and surgical interventions.  We will start the patient on tamsulosin for now, then bring him back for a cysto when he returns from a trip.  We will then teach the patient CIC if he is willing and make further plans based upon the cysto. He may also need a prostate MRI before further surgical intervention.

## 2023-07-20 NOTE — PATIENT INSTRUCTIONS
Per physician instructions.    If you have questions or concerns on any instructions given to you by your provider today or if you need to schedule an appointment, you can reach us at [phone].  Listen to the menu for the Specialty Clinic option.      Thank you!

## 2023-08-10 ENCOUNTER — OFFICE VISIT (OUTPATIENT)
Dept: UROLOGY | Facility: CLINIC | Age: 68
End: 2023-08-10
Payer: MEDICARE

## 2023-08-10 VITALS
DIASTOLIC BLOOD PRESSURE: 89 MMHG | WEIGHT: 184.97 LBS | HEART RATE: 75 BPM | TEMPERATURE: 98.1 F | SYSTOLIC BLOOD PRESSURE: 163 MMHG | BODY MASS INDEX: 29.73 KG/M2 | HEIGHT: 66 IN

## 2023-08-10 DIAGNOSIS — R33.9 URINARY RETENTION: Primary | ICD-10-CM

## 2023-08-10 PROCEDURE — 51798 US URINE CAPACITY MEASURE: CPT | Performed by: UROLOGY

## 2023-08-10 PROCEDURE — 99214 OFFICE O/P EST MOD 30 MIN: CPT | Mod: 25 | Performed by: UROLOGY

## 2023-08-10 PROCEDURE — 52000 CYSTOURETHROSCOPY: CPT | Performed by: UROLOGY

## 2023-08-10 NOTE — PROGRESS NOTES
CC: renal cyst, bladder diverticulum, BPH     HPI: 69 yo male with history of a renal cyst, BPH s/p PAE 05/2021, and bladder diverticulum noted on imaging.  Patient has been having some abdominal bloating and discomfort which is what prompted the imaging.  He also has a history of elevated PSA and a negative prostate biopsy about 15 years ago.  Here for cysto    Cystoscopy: after informed consent was obtained, the patient was prepped and draped in standard sterile fashion. The flexible cystoscope was introduced into the patient's urethra without difficulty. There were no strictures in the urethra. Upon entering the bladder, the UOs were orthotopic and effluxing clear urine. There were several diverticulae noted, without tumors.  There was a significant median lobe noted on retroflexion, and significant lateral lobe hypertrophy.    Void/pvr: 232/476 cc    ASSESSMENT AND PLAN: Over half of today's 35-minute visit which was conducted separately from the cysto was spent reviewing the chart, results and counseling the patient regarding his retention.  We again discussed options  of observation vs further outlet procedures vs CIC, and the patient is most interested in CIC at this time.  He was taught this in clinic today.

## 2023-08-10 NOTE — PROGRESS NOTES
Patient taught to self catheterize per Dr. yañez. Patient demonstrated the procedure well. Patient was taught with a 14 fr. Catheter. Supplies given to the patient. Patient is to keep urine volume under 400 cc if possible.samples were sent home with patient.  ale headley LPN

## 2023-08-10 NOTE — NURSING NOTE
"Initial BP (!) 163/89 (BP Location: Left arm, Patient Position: Sitting, Cuff Size: Adult Regular)   Pulse 75   Temp 98.1  F (36.7  C) (Tympanic)   Ht 1.676 m (5' 5.98\")   Wt 83.9 kg (184 lb 15.5 oz)   BMI 29.87 kg/m   Estimated body mass index is 29.87 kg/m  as calculated from the following:    Height as of this encounter: 1.676 m (5' 5.98\").    Weight as of this encounter: 83.9 kg (184 lb 15.5 oz). .  Keysha Bush MA    "

## 2023-08-21 ENCOUNTER — APPOINTMENT (OUTPATIENT)
Dept: CT IMAGING | Facility: CLINIC | Age: 68
End: 2023-08-21
Attending: EMERGENCY MEDICINE
Payer: MEDICARE

## 2023-08-21 ENCOUNTER — ANESTHESIA (OUTPATIENT)
Dept: SURGERY | Facility: CLINIC | Age: 68
End: 2023-08-21
Payer: MEDICARE

## 2023-08-21 ENCOUNTER — ANESTHESIA EVENT (OUTPATIENT)
Dept: SURGERY | Facility: CLINIC | Age: 68
End: 2023-08-21
Payer: MEDICARE

## 2023-08-21 ENCOUNTER — NURSE TRIAGE (OUTPATIENT)
Dept: NURSING | Facility: CLINIC | Age: 68
End: 2023-08-21
Payer: MEDICARE

## 2023-08-21 ENCOUNTER — HOSPITAL ENCOUNTER (OUTPATIENT)
Facility: CLINIC | Age: 68
Setting detail: OBSERVATION
Discharge: HOME | End: 2023-08-22
Attending: EMERGENCY MEDICINE | Admitting: INTERNAL MEDICINE
Payer: MEDICARE

## 2023-08-21 DIAGNOSIS — N40.1 BENIGN PROSTATIC HYPERPLASIA WITH URINARY RETENTION: ICD-10-CM

## 2023-08-21 DIAGNOSIS — N32.3 BLADDER DIVERTICULUM: ICD-10-CM

## 2023-08-21 DIAGNOSIS — R33.8 BENIGN PROSTATIC HYPERPLASIA WITH URINARY RETENTION: ICD-10-CM

## 2023-08-21 DIAGNOSIS — R33.9 URINARY RETENTION: ICD-10-CM

## 2023-08-21 DIAGNOSIS — R31.0 GROSS HEMATURIA: Primary | ICD-10-CM

## 2023-08-21 PROBLEM — R31.9 HEMATURIA: Status: ACTIVE | Noted: 2023-08-21

## 2023-08-21 LAB
ABO/RH(D): NORMAL
ALBUMIN SERPL BCG-MCNC: 4.3 G/DL (ref 3.5–5.2)
ALBUMIN UR-MCNC: 600 MG/DL
ALP SERPL-CCNC: 67 U/L (ref 40–129)
ALT SERPL W P-5'-P-CCNC: 17 U/L (ref 0–70)
ANION GAP SERPL CALCULATED.3IONS-SCNC: 15 MMOL/L (ref 7–15)
ANTIBODY SCREEN: NEGATIVE
APPEARANCE UR: ABNORMAL
AST SERPL W P-5'-P-CCNC: 28 U/L (ref 0–45)
BASOPHILS # BLD AUTO: 0 10E3/UL (ref 0–0.2)
BASOPHILS NFR BLD AUTO: 0 %
BILIRUB SERPL-MCNC: 0.3 MG/DL
BILIRUB UR QL STRIP: NEGATIVE
BUN SERPL-MCNC: 27.6 MG/DL (ref 8–23)
CALCIUM SERPL-MCNC: 9.5 MG/DL (ref 8.8–10.2)
CHLORIDE SERPL-SCNC: 102 MMOL/L (ref 98–107)
COLOR UR AUTO: ABNORMAL
CREAT BLD-MCNC: 1 MG/DL (ref 0.7–1.3)
CREAT SERPL-MCNC: 0.94 MG/DL (ref 0.67–1.17)
DEPRECATED HCO3 PLAS-SCNC: 22 MMOL/L (ref 22–29)
EOSINOPHIL # BLD AUTO: 0.1 10E3/UL (ref 0–0.7)
EOSINOPHIL NFR BLD AUTO: 2 %
ERYTHROCYTE [DISTWIDTH] IN BLOOD BY AUTOMATED COUNT: 12.3 % (ref 10–15)
GFR SERPL CREATININE-BSD FRML MDRD: 88 ML/MIN/1.73M2
GFR SERPL CREATININE-BSD FRML MDRD: >60 ML/MIN/1.73M2
GLUCOSE SERPL-MCNC: 108 MG/DL (ref 70–99)
GLUCOSE UR STRIP-MCNC: NEGATIVE MG/DL
HCT VFR BLD AUTO: 44.2 % (ref 40–53)
HGB BLD-MCNC: 12.8 G/DL (ref 13.3–17.7)
HGB BLD-MCNC: 13.2 G/DL (ref 13.3–17.7)
HGB BLD-MCNC: 15 G/DL (ref 13.3–17.7)
HGB UR QL STRIP: ABNORMAL
IMM GRANULOCYTES # BLD: 0 10E3/UL
IMM GRANULOCYTES NFR BLD: 0 %
KETONES UR STRIP-MCNC: ABNORMAL MG/DL
LEUKOCYTE ESTERASE UR QL STRIP: NEGATIVE
LYMPHOCYTES # BLD AUTO: 1.1 10E3/UL (ref 0.8–5.3)
LYMPHOCYTES NFR BLD AUTO: 13 %
MCH RBC QN AUTO: 30.9 PG (ref 26.5–33)
MCHC RBC AUTO-ENTMCNC: 33.9 G/DL (ref 31.5–36.5)
MCV RBC AUTO: 91 FL (ref 78–100)
MONOCYTES # BLD AUTO: 0.7 10E3/UL (ref 0–1.3)
MONOCYTES NFR BLD AUTO: 8 %
NEUTROPHILS # BLD AUTO: 6.8 10E3/UL (ref 1.6–8.3)
NEUTROPHILS NFR BLD AUTO: 77 %
NITRATE UR QL: NEGATIVE
NRBC # BLD AUTO: 0 10E3/UL
NRBC BLD AUTO-RTO: 0 /100
PH UR STRIP: 7 [PH] (ref 5–7)
PLATELET # BLD AUTO: 265 10E3/UL (ref 150–450)
POTASSIUM SERPL-SCNC: 4.3 MMOL/L (ref 3.4–5.3)
PROT SERPL-MCNC: 6.8 G/DL (ref 6.4–8.3)
RBC # BLD AUTO: 4.86 10E6/UL (ref 4.4–5.9)
RBC URINE: >182 /HPF
SODIUM SERPL-SCNC: 139 MMOL/L (ref 136–145)
SP GR UR STRIP: 1.02 (ref 1–1.03)
SPECIMEN EXPIRATION DATE: NORMAL
UROBILINOGEN UR STRIP-MCNC: NORMAL MG/DL
WBC # BLD AUTO: 8.8 10E3/UL (ref 4–11)
WBC URINE: 0 /HPF

## 2023-08-21 PROCEDURE — 250N000011 HC RX IP 250 OP 636: Performed by: STUDENT IN AN ORGANIZED HEALTH CARE EDUCATION/TRAINING PROGRAM

## 2023-08-21 PROCEDURE — 99207 PR APP CREDIT; MD BILLING SHARED VISIT: CPT | Performed by: INTERNAL MEDICINE

## 2023-08-21 PROCEDURE — 250N000013 HC RX MED GY IP 250 OP 250 PS 637: Performed by: PHYSICIAN ASSISTANT

## 2023-08-21 PROCEDURE — 258N000003 HC RX IP 258 OP 636: Performed by: ANESTHESIOLOGY

## 2023-08-21 PROCEDURE — 99418 PROLNG IP/OBS E/M EA 15 MIN: CPT | Performed by: INTERNAL MEDICINE

## 2023-08-21 PROCEDURE — 99222 1ST HOSP IP/OBS MODERATE 55: CPT | Mod: 25 | Performed by: STUDENT IN AN ORGANIZED HEALTH CARE EDUCATION/TRAINING PROGRAM

## 2023-08-21 PROCEDURE — 86850 RBC ANTIBODY SCREEN: CPT | Performed by: EMERGENCY MEDICINE

## 2023-08-21 PROCEDURE — 250N000011 HC RX IP 250 OP 636: Mod: JZ | Performed by: NURSE ANESTHETIST, CERTIFIED REGISTERED

## 2023-08-21 PROCEDURE — 96361 HYDRATE IV INFUSION ADD-ON: CPT

## 2023-08-21 PROCEDURE — 96365 THER/PROPH/DIAG IV INF INIT: CPT

## 2023-08-21 PROCEDURE — 99223 1ST HOSP IP/OBS HIGH 75: CPT | Mod: AI | Performed by: INTERNAL MEDICINE

## 2023-08-21 PROCEDURE — 85018 HEMOGLOBIN: CPT | Performed by: EMERGENCY MEDICINE

## 2023-08-21 PROCEDURE — 99285 EMERGENCY DEPT VISIT HI MDM: CPT | Mod: 25

## 2023-08-21 PROCEDURE — 272N000001 HC OR GENERAL SUPPLY STERILE: Performed by: STUDENT IN AN ORGANIZED HEALTH CARE EDUCATION/TRAINING PROGRAM

## 2023-08-21 PROCEDURE — 36415 COLL VENOUS BLD VENIPUNCTURE: CPT | Performed by: EMERGENCY MEDICINE

## 2023-08-21 PROCEDURE — 258N000003 HC RX IP 258 OP 636: Performed by: INTERNAL MEDICINE

## 2023-08-21 PROCEDURE — 250N000013 HC RX MED GY IP 250 OP 250 PS 637: Performed by: INTERNAL MEDICINE

## 2023-08-21 PROCEDURE — 51702 INSERT TEMP BLADDER CATH: CPT

## 2023-08-21 PROCEDURE — 96376 TX/PRO/DX INJ SAME DRUG ADON: CPT

## 2023-08-21 PROCEDURE — 710N000009 HC RECOVERY PHASE 1, LEVEL 1, PER MIN: Performed by: STUDENT IN AN ORGANIZED HEALTH CARE EDUCATION/TRAINING PROGRAM

## 2023-08-21 PROCEDURE — 258N000003 HC RX IP 258 OP 636: Performed by: NURSE ANESTHETIST, CERTIFIED REGISTERED

## 2023-08-21 PROCEDURE — 999N000141 HC STATISTIC PRE-PROCEDURE NURSING ASSESSMENT: Performed by: STUDENT IN AN ORGANIZED HEALTH CARE EDUCATION/TRAINING PROGRAM

## 2023-08-21 PROCEDURE — 250N000011 HC RX IP 250 OP 636: Performed by: ANESTHESIOLOGY

## 2023-08-21 PROCEDURE — G0378 HOSPITAL OBSERVATION PER HR: HCPCS

## 2023-08-21 PROCEDURE — 82565 ASSAY OF CREATININE: CPT | Mod: 91

## 2023-08-21 PROCEDURE — 36415 COLL VENOUS BLD VENIPUNCTURE: CPT | Performed by: INTERNAL MEDICINE

## 2023-08-21 PROCEDURE — 96374 THER/PROPH/DIAG INJ IV PUSH: CPT

## 2023-08-21 PROCEDURE — 250N000011 HC RX IP 250 OP 636: Mod: JZ | Performed by: INTERNAL MEDICINE

## 2023-08-21 PROCEDURE — 96375 TX/PRO/DX INJ NEW DRUG ADDON: CPT

## 2023-08-21 PROCEDURE — 250N000025 HC SEVOFLURANE, PER MIN: Performed by: STUDENT IN AN ORGANIZED HEALTH CARE EDUCATION/TRAINING PROGRAM

## 2023-08-21 PROCEDURE — 370N000017 HC ANESTHESIA TECHNICAL FEE, PER MIN: Performed by: STUDENT IN AN ORGANIZED HEALTH CARE EDUCATION/TRAINING PROGRAM

## 2023-08-21 PROCEDURE — 250N000009 HC RX 250: Performed by: EMERGENCY MEDICINE

## 2023-08-21 PROCEDURE — 250N000011 HC RX IP 250 OP 636: Mod: JZ | Performed by: EMERGENCY MEDICINE

## 2023-08-21 PROCEDURE — 80053 COMPREHEN METABOLIC PANEL: CPT | Performed by: EMERGENCY MEDICINE

## 2023-08-21 PROCEDURE — 87086 URINE CULTURE/COLONY COUNT: CPT | Performed by: PHYSICIAN ASSISTANT

## 2023-08-21 PROCEDURE — G1010 CDSM STANSON: HCPCS

## 2023-08-21 PROCEDURE — 120N000001 HC R&B MED SURG/OB

## 2023-08-21 PROCEDURE — 360N000075 HC SURGERY LEVEL 2, PER MIN: Performed by: STUDENT IN AN ORGANIZED HEALTH CARE EDUCATION/TRAINING PROGRAM

## 2023-08-21 PROCEDURE — 52001 CYSTO W/IRRG&EVAC MLT CLOTS: CPT | Mod: 59 | Performed by: STUDENT IN AN ORGANIZED HEALTH CARE EDUCATION/TRAINING PROGRAM

## 2023-08-21 PROCEDURE — 258N000003 HC RX IP 258 OP 636: Performed by: STUDENT IN AN ORGANIZED HEALTH CARE EDUCATION/TRAINING PROGRAM

## 2023-08-21 PROCEDURE — 250N000009 HC RX 250: Performed by: STUDENT IN AN ORGANIZED HEALTH CARE EDUCATION/TRAINING PROGRAM

## 2023-08-21 PROCEDURE — 85018 HEMOGLOBIN: CPT | Performed by: INTERNAL MEDICINE

## 2023-08-21 PROCEDURE — 250N000009 HC RX 250: Performed by: INTERNAL MEDICINE

## 2023-08-21 PROCEDURE — 52214 CYSTOSCOPY AND TREATMENT: CPT | Performed by: STUDENT IN AN ORGANIZED HEALTH CARE EDUCATION/TRAINING PROGRAM

## 2023-08-21 PROCEDURE — 81003 URINALYSIS AUTO W/O SCOPE: CPT | Performed by: EMERGENCY MEDICINE

## 2023-08-21 PROCEDURE — 250N000009 HC RX 250: Performed by: NURSE ANESTHETIST, CERTIFIED REGISTERED

## 2023-08-21 PROCEDURE — 250N000013 HC RX MED GY IP 250 OP 250 PS 637: Performed by: STUDENT IN AN ORGANIZED HEALTH CARE EDUCATION/TRAINING PROGRAM

## 2023-08-21 PROCEDURE — 85025 COMPLETE CBC W/AUTO DIFF WBC: CPT | Performed by: EMERGENCY MEDICINE

## 2023-08-21 PROCEDURE — 258N000003 HC RX IP 258 OP 636: Performed by: EMERGENCY MEDICINE

## 2023-08-21 PROCEDURE — 258N000001 HC RX 258: Performed by: STUDENT IN AN ORGANIZED HEALTH CARE EDUCATION/TRAINING PROGRAM

## 2023-08-21 RX ORDER — NALOXONE HYDROCHLORIDE 0.4 MG/ML
0.4 INJECTION, SOLUTION INTRAMUSCULAR; INTRAVENOUS; SUBCUTANEOUS
Status: DISCONTINUED | OUTPATIENT
Start: 2023-08-21 | End: 2023-08-22 | Stop reason: HOSPADM

## 2023-08-21 RX ORDER — FENTANYL CITRATE 50 UG/ML
25 INJECTION, SOLUTION INTRAMUSCULAR; INTRAVENOUS EVERY 5 MIN PRN
Status: DISCONTINUED | OUTPATIENT
Start: 2023-08-21 | End: 2023-08-21 | Stop reason: HOSPADM

## 2023-08-21 RX ORDER — HYDROMORPHONE HCL IN WATER/PF 6 MG/30 ML
0.2 PATIENT CONTROLLED ANALGESIA SYRINGE INTRAVENOUS EVERY 5 MIN PRN
Status: DISCONTINUED | OUTPATIENT
Start: 2023-08-21 | End: 2023-08-21 | Stop reason: HOSPADM

## 2023-08-21 RX ORDER — PROCHLORPERAZINE 25 MG
12.5 SUPPOSITORY, RECTAL RECTAL EVERY 12 HOURS PRN
Status: DISCONTINUED | OUTPATIENT
Start: 2023-08-21 | End: 2023-08-22 | Stop reason: HOSPADM

## 2023-08-21 RX ORDER — ONDANSETRON 2 MG/ML
INJECTION INTRAMUSCULAR; INTRAVENOUS PRN
Status: DISCONTINUED | OUTPATIENT
Start: 2023-08-21 | End: 2023-08-21

## 2023-08-21 RX ORDER — LABETALOL HYDROCHLORIDE 5 MG/ML
10 INJECTION, SOLUTION INTRAVENOUS EVERY 10 MIN PRN
Status: DISCONTINUED | OUTPATIENT
Start: 2023-08-21 | End: 2023-08-21 | Stop reason: HOSPADM

## 2023-08-21 RX ORDER — CEFTRIAXONE 1 G/1
1 INJECTION, POWDER, FOR SOLUTION INTRAMUSCULAR; INTRAVENOUS ONCE
Status: COMPLETED | OUTPATIENT
Start: 2023-08-21 | End: 2023-08-21

## 2023-08-21 RX ORDER — ONDANSETRON 2 MG/ML
4 INJECTION INTRAMUSCULAR; INTRAVENOUS EVERY 6 HOURS PRN
Status: DISCONTINUED | OUTPATIENT
Start: 2023-08-21 | End: 2023-08-22 | Stop reason: HOSPADM

## 2023-08-21 RX ORDER — ALBUTEROL SULFATE 0.83 MG/ML
2.5 SOLUTION RESPIRATORY (INHALATION) EVERY 4 HOURS PRN
Status: DISCONTINUED | OUTPATIENT
Start: 2023-08-21 | End: 2023-08-21 | Stop reason: HOSPADM

## 2023-08-21 RX ORDER — SODIUM CHLORIDE 9 MG/ML
INJECTION, SOLUTION INTRAVENOUS CONTINUOUS
Status: DISCONTINUED | OUTPATIENT
Start: 2023-08-21 | End: 2023-08-22 | Stop reason: HOSPADM

## 2023-08-21 RX ORDER — ACETAMINOPHEN 325 MG/1
650 TABLET ORAL EVERY 6 HOURS PRN
Status: DISCONTINUED | OUTPATIENT
Start: 2023-08-21 | End: 2023-08-22 | Stop reason: HOSPADM

## 2023-08-21 RX ORDER — TAMSULOSIN HYDROCHLORIDE 0.4 MG/1
0.4 CAPSULE ORAL DAILY
Status: DISCONTINUED | OUTPATIENT
Start: 2023-08-21 | End: 2023-08-22 | Stop reason: HOSPADM

## 2023-08-21 RX ORDER — NALOXONE HYDROCHLORIDE 0.4 MG/ML
0.2 INJECTION, SOLUTION INTRAMUSCULAR; INTRAVENOUS; SUBCUTANEOUS
Status: DISCONTINUED | OUTPATIENT
Start: 2023-08-21 | End: 2023-08-22 | Stop reason: HOSPADM

## 2023-08-21 RX ORDER — SODIUM CHLORIDE, SODIUM LACTATE, POTASSIUM CHLORIDE, CALCIUM CHLORIDE 600; 310; 30; 20 MG/100ML; MG/100ML; MG/100ML; MG/100ML
INJECTION, SOLUTION INTRAVENOUS CONTINUOUS PRN
Status: DISCONTINUED | OUTPATIENT
Start: 2023-08-21 | End: 2023-08-21

## 2023-08-21 RX ORDER — HYDROMORPHONE HYDROCHLORIDE 1 MG/ML
0.5 INJECTION, SOLUTION INTRAMUSCULAR; INTRAVENOUS; SUBCUTANEOUS ONCE
Status: COMPLETED | OUTPATIENT
Start: 2023-08-21 | End: 2023-08-21

## 2023-08-21 RX ORDER — SODIUM CHLORIDE, SODIUM LACTATE, POTASSIUM CHLORIDE, CALCIUM CHLORIDE 600; 310; 30; 20 MG/100ML; MG/100ML; MG/100ML; MG/100ML
INJECTION, SOLUTION INTRAVENOUS CONTINUOUS
Status: DISCONTINUED | OUTPATIENT
Start: 2023-08-21 | End: 2023-08-21 | Stop reason: HOSPADM

## 2023-08-21 RX ORDER — DIAZEPAM 10 MG/2ML
2.5 INJECTION, SOLUTION INTRAMUSCULAR; INTRAVENOUS
Status: DISCONTINUED | OUTPATIENT
Start: 2023-08-21 | End: 2023-08-21 | Stop reason: HOSPADM

## 2023-08-21 RX ORDER — TROSPIUM CHLORIDE 20 MG/1
20 TABLET, FILM COATED ORAL
Status: DISCONTINUED | OUTPATIENT
Start: 2023-08-21 | End: 2023-08-22 | Stop reason: HOSPADM

## 2023-08-21 RX ORDER — PROPOFOL 10 MG/ML
INJECTION, EMULSION INTRAVENOUS PRN
Status: DISCONTINUED | OUTPATIENT
Start: 2023-08-21 | End: 2023-08-21

## 2023-08-21 RX ORDER — LIDOCAINE HYDROCHLORIDE 20 MG/ML
INJECTION, SOLUTION INFILTRATION; PERINEURAL PRN
Status: DISCONTINUED | OUTPATIENT
Start: 2023-08-21 | End: 2023-08-21

## 2023-08-21 RX ORDER — LIDOCAINE HYDROCHLORIDE 20 MG/ML
JELLY TOPICAL ONCE
Status: DISCONTINUED | OUTPATIENT
Start: 2023-08-21 | End: 2023-08-21

## 2023-08-21 RX ORDER — ONDANSETRON 4 MG/1
4 TABLET, ORALLY DISINTEGRATING ORAL EVERY 30 MIN PRN
Status: DISCONTINUED | OUTPATIENT
Start: 2023-08-21 | End: 2023-08-21 | Stop reason: HOSPADM

## 2023-08-21 RX ORDER — HYDROMORPHONE HYDROCHLORIDE 1 MG/ML
0.3 INJECTION, SOLUTION INTRAMUSCULAR; INTRAVENOUS; SUBCUTANEOUS ONCE
Status: COMPLETED | OUTPATIENT
Start: 2023-08-21 | End: 2023-08-21

## 2023-08-21 RX ORDER — CEFTRIAXONE 2 G/1
2 INJECTION, POWDER, FOR SOLUTION INTRAMUSCULAR; INTRAVENOUS EVERY 24 HOURS
Status: DISCONTINUED | OUTPATIENT
Start: 2023-08-22 | End: 2023-08-22 | Stop reason: HOSPADM

## 2023-08-21 RX ORDER — PROCHLORPERAZINE MALEATE 5 MG
5 TABLET ORAL EVERY 6 HOURS PRN
Status: DISCONTINUED | OUTPATIENT
Start: 2023-08-21 | End: 2023-08-22 | Stop reason: HOSPADM

## 2023-08-21 RX ORDER — DEXAMETHASONE SODIUM PHOSPHATE 4 MG/ML
4 INJECTION, SOLUTION INTRA-ARTICULAR; INTRALESIONAL; INTRAMUSCULAR; INTRAVENOUS; SOFT TISSUE
Status: COMPLETED | OUTPATIENT
Start: 2023-08-21 | End: 2023-08-21

## 2023-08-21 RX ORDER — ACETAMINOPHEN 650 MG/1
650 SUPPOSITORY RECTAL EVERY 6 HOURS PRN
Status: DISCONTINUED | OUTPATIENT
Start: 2023-08-21 | End: 2023-08-22 | Stop reason: HOSPADM

## 2023-08-21 RX ORDER — LIDOCAINE HYDROCHLORIDE 20 MG/ML
JELLY TOPICAL ONCE
Status: COMPLETED | OUTPATIENT
Start: 2023-08-21 | End: 2023-08-21

## 2023-08-21 RX ORDER — LIDOCAINE HYDROCHLORIDE 20 MG/ML
6 JELLY TOPICAL ONCE
Status: COMPLETED | OUTPATIENT
Start: 2023-08-21 | End: 2023-08-21

## 2023-08-21 RX ORDER — TAMSULOSIN HYDROCHLORIDE 0.4 MG/1
0.4 CAPSULE ORAL DAILY
Status: DISCONTINUED | OUTPATIENT
Start: 2023-08-21 | End: 2023-08-21

## 2023-08-21 RX ORDER — LIDOCAINE 40 MG/G
CREAM TOPICAL
Status: DISCONTINUED | OUTPATIENT
Start: 2023-08-21 | End: 2023-08-21 | Stop reason: HOSPADM

## 2023-08-21 RX ORDER — CEFAZOLIN SODIUM/WATER 2 G/20 ML
2 SYRINGE (ML) INTRAVENOUS
Status: COMPLETED | OUTPATIENT
Start: 2023-08-21 | End: 2023-08-21

## 2023-08-21 RX ORDER — ONDANSETRON 4 MG/1
4 TABLET, ORALLY DISINTEGRATING ORAL EVERY 6 HOURS PRN
Status: DISCONTINUED | OUTPATIENT
Start: 2023-08-21 | End: 2023-08-22 | Stop reason: HOSPADM

## 2023-08-21 RX ORDER — ONDANSETRON 2 MG/ML
4 INJECTION INTRAMUSCULAR; INTRAVENOUS EVERY 30 MIN PRN
Status: DISCONTINUED | OUTPATIENT
Start: 2023-08-21 | End: 2023-08-21 | Stop reason: HOSPADM

## 2023-08-21 RX ORDER — MEPERIDINE HYDROCHLORIDE 25 MG/ML
12.5 INJECTION INTRAMUSCULAR; INTRAVENOUS; SUBCUTANEOUS EVERY 5 MIN PRN
Status: DISCONTINUED | OUTPATIENT
Start: 2023-08-21 | End: 2023-08-21 | Stop reason: HOSPADM

## 2023-08-21 RX ORDER — CEFAZOLIN SODIUM/WATER 2 G/20 ML
2 SYRINGE (ML) INTRAVENOUS SEE ADMIN INSTRUCTIONS
Status: DISCONTINUED | OUTPATIENT
Start: 2023-08-21 | End: 2023-08-21 | Stop reason: HOSPADM

## 2023-08-21 RX ADMIN — TROSPIUM CHLORIDE 20 MG: 20 TABLET, FILM COATED ORAL at 15:57

## 2023-08-21 RX ADMIN — FENTANYL CITRATE 25 MCG: 50 INJECTION, SOLUTION INTRAMUSCULAR; INTRAVENOUS at 18:46

## 2023-08-21 RX ADMIN — FENTANYL CITRATE 25 MCG: 50 INJECTION, SOLUTION INTRAMUSCULAR; INTRAVENOUS at 18:23

## 2023-08-21 RX ADMIN — SODIUM CHLORIDE 1000 ML: 9 INJECTION, SOLUTION INTRAVENOUS at 03:55

## 2023-08-21 RX ADMIN — FENTANYL CITRATE 25 MCG: 50 INJECTION, SOLUTION INTRAMUSCULAR; INTRAVENOUS at 18:34

## 2023-08-21 RX ADMIN — HYDROMORPHONE HYDROCHLORIDE 1 MG: 1 INJECTION, SOLUTION INTRAMUSCULAR; INTRAVENOUS; SUBCUTANEOUS at 07:01

## 2023-08-21 RX ADMIN — LIDOCAINE HYDROCHLORIDE 6 ML: 20 JELLY TOPICAL at 03:55

## 2023-08-21 RX ADMIN — SODIUM CHLORIDE, POTASSIUM CHLORIDE, SODIUM LACTATE AND CALCIUM CHLORIDE: 600; 310; 30; 20 INJECTION, SOLUTION INTRAVENOUS at 18:24

## 2023-08-21 RX ADMIN — FENTANYL CITRATE 50 MCG: 50 INJECTION, SOLUTION INTRAMUSCULAR; INTRAVENOUS at 17:21

## 2023-08-21 RX ADMIN — CEFTRIAXONE 1 G: 1 INJECTION, POWDER, FOR SOLUTION INTRAMUSCULAR; INTRAVENOUS at 11:12

## 2023-08-21 RX ADMIN — LIDOCAINE HYDROCHLORIDE 50 MG: 20 INJECTION, SOLUTION INFILTRATION; PERINEURAL at 17:21

## 2023-08-21 RX ADMIN — PHENYLEPHRINE HYDROCHLORIDE 200 MCG: 10 INJECTION INTRAVENOUS at 17:44

## 2023-08-21 RX ADMIN — FENTANYL CITRATE 25 MCG: 50 INJECTION, SOLUTION INTRAMUSCULAR; INTRAVENOUS at 18:28

## 2023-08-21 RX ADMIN — HYDROMORPHONE HYDROCHLORIDE 0.3 MG: 1 INJECTION, SOLUTION INTRAMUSCULAR; INTRAVENOUS; SUBCUTANEOUS at 03:54

## 2023-08-21 RX ADMIN — ONDANSETRON 4 MG: 2 INJECTION INTRAMUSCULAR; INTRAVENOUS at 17:41

## 2023-08-21 RX ADMIN — PHENYLEPHRINE HYDROCHLORIDE 300 MCG: 10 INJECTION INTRAVENOUS at 17:26

## 2023-08-21 RX ADMIN — SODIUM CHLORIDE: 9 INJECTION, SOLUTION INTRAVENOUS at 21:15

## 2023-08-21 RX ADMIN — Medication 2 G: at 17:18

## 2023-08-21 RX ADMIN — PROPOFOL 180 MG: 10 INJECTION, EMULSION INTRAVENOUS at 17:21

## 2023-08-21 RX ADMIN — SODIUM CHLORIDE, POTASSIUM CHLORIDE, SODIUM LACTATE AND CALCIUM CHLORIDE: 600; 310; 30; 20 INJECTION, SOLUTION INTRAVENOUS at 17:00

## 2023-08-21 RX ADMIN — HYDROMORPHONE HYDROCHLORIDE 1 MG: 1 INJECTION, SOLUTION INTRAMUSCULAR; INTRAVENOUS; SUBCUTANEOUS at 06:34

## 2023-08-21 RX ADMIN — SODIUM CHLORIDE, POTASSIUM CHLORIDE, SODIUM LACTATE AND CALCIUM CHLORIDE: 600; 310; 30; 20 INJECTION, SOLUTION INTRAVENOUS at 17:41

## 2023-08-21 RX ADMIN — HYDROMORPHONE HYDROCHLORIDE 1 MG: 1 INJECTION, SOLUTION INTRAMUSCULAR; INTRAVENOUS; SUBCUTANEOUS at 11:43

## 2023-08-21 RX ADMIN — TROSPIUM CHLORIDE 20 MG: 20 TABLET, FILM COATED ORAL at 10:38

## 2023-08-21 RX ADMIN — SODIUM CHLORIDE: 9 INJECTION, SOLUTION INTRAVENOUS at 08:33

## 2023-08-21 RX ADMIN — CEFTRIAXONE 1 G: 1 INJECTION, POWDER, FOR SOLUTION INTRAMUSCULAR; INTRAVENOUS at 06:04

## 2023-08-21 RX ADMIN — PHENYLEPHRINE HYDROCHLORIDE 200 MCG: 10 INJECTION INTRAVENOUS at 17:36

## 2023-08-21 RX ADMIN — MIDAZOLAM 2 MG: 1 INJECTION INTRAMUSCULAR; INTRAVENOUS at 17:16

## 2023-08-21 RX ADMIN — TAMSULOSIN HYDROCHLORIDE 0.4 MG: 0.4 CAPSULE ORAL at 21:25

## 2023-08-21 RX ADMIN — HYDROMORPHONE HYDROCHLORIDE 0.5 MG: 1 INJECTION, SOLUTION INTRAMUSCULAR; INTRAVENOUS; SUBCUTANEOUS at 05:44

## 2023-08-21 RX ADMIN — DEXAMETHASONE SODIUM PHOSPHATE 8 MG: 4 INJECTION, SOLUTION INTRA-ARTICULAR; INTRALESIONAL; INTRAMUSCULAR; INTRAVENOUS; SOFT TISSUE at 17:22

## 2023-08-21 RX ADMIN — LIDOCAINE HYDROCHLORIDE: 20 JELLY TOPICAL at 06:34

## 2023-08-21 RX ADMIN — PHENYLEPHRINE HYDROCHLORIDE 300 MCG: 10 INJECTION INTRAVENOUS at 17:29

## 2023-08-21 RX ADMIN — ONDANSETRON 4 MG: 2 INJECTION INTRAMUSCULAR; INTRAVENOUS at 18:40

## 2023-08-21 RX ADMIN — SODIUM CHLORIDE 3000 ML: 900 IRRIGANT IRRIGATION at 21:21

## 2023-08-21 ASSESSMENT — ACTIVITIES OF DAILY LIVING (ADL)
ADLS_ACUITY_SCORE: 22
DIFFICULTY_EATING/SWALLOWING: NO
ADLS_ACUITY_SCORE: 22
WEAR_GLASSES_OR_BLIND: YES
ADLS_ACUITY_SCORE: 22
DOING_ERRANDS_INDEPENDENTLY_DIFFICULTY: NO
ADLS_ACUITY_SCORE: 35
ADLS_ACUITY_SCORE: 22
ADLS_ACUITY_SCORE: 35
WALKING_OR_CLIMBING_STAIRS_DIFFICULTY: NO
TOILETING_ISSUES: NO
CHANGE_IN_FUNCTIONAL_STATUS_SINCE_ONSET_OF_CURRENT_ILLNESS/INJURY: NO
ADLS_ACUITY_SCORE: 22
FALL_HISTORY_WITHIN_LAST_SIX_MONTHS: NO
VISION_MANAGEMENT: SELF
DRESSING/BATHING_DIFFICULTY: NO
ADLS_ACUITY_SCORE: 22
CONCENTRATING,_REMEMBERING_OR_MAKING_DECISIONS_DIFFICULTY: NO

## 2023-08-21 NOTE — ED NOTES
"[facility]  ED Nurse Handoff Report    ED Chief complaint: Hematuria  . ED Diagnosis:   Final diagnoses:   Urinary retention   Bladder diverticulum - infected       Allergies:   Allergies   Allergen Reactions    Lisinopril Cough       Code Status: Full Code    Activity level - Baseline/Home:  independent.  Activity Level - Current:   standby.   Lift room needed: No.   Bariatric: No   Needed: No   Isolation: No.   Infection: Not Applicable.     Respiratory status: Room air    Vital Signs (within 30 minutes):   Vitals:    08/21/23 0314   BP: (!) 172/109   Pulse: 83   Resp: 18   Temp: 98  F (36.7  C)   TempSrc: Temporal   SpO2: 97%   Weight: 83.9 kg (185 lb)   Height: 1.676 m (5' 6\")       Cardiac Rhythm:  ,      Pain level:    Patient confused: No.   Patient Falls Risk: patient and family education.   Elimination Status: Urethral catheter (mcgill) in place; refer to orders to discontinue as per protocol      Patient Report - Initial Complaint: Hematuria.   Focused Assessment: Patient is a 68-year-old male presenting with predominantly abdominal pain and hematuria.  He is quite uncomfortable appearing on arrival.  He was found to be retaining urine greater than 600 cc on arrival.  Given large clot burden decision was made to place three-way Mcgill catheter for continuous bladder irrigation (CBI).  Unfortunately there was some resistance with the three-way catheter, consulted urology who recommended replacing the three-way catheter with manual irrigation followed by CBI attempt.  CT does show concerns for infected bladder diverticuli today and blood in the bladder though no obstructive uropathy.  Patient was given a dose of IV Rocephin.  Hemoglobin stable, slightly down trended on repeat hemoglobin though I suspect in part this is dilutional.  Patient is excepted by hospitalist for admission and remained hemodynamically stable.        Abnormal Results:   Labs Ordered and Resulted from " Time of ED Arrival to Time of ED Departure   ROUTINE UA WITH MICROSCOPIC REFLEX TO CULTURE - Abnormal       Result Value    Color Urine Dark Red (*)     Appearance Urine Cloudy (*)     Glucose Urine Negative      Bilirubin Urine Negative      Ketones Urine Trace (*)     Specific Gravity Urine 1.025      Blood Urine Large (*)     pH Urine 7.0      Protein Albumin Urine 600 (*)     Urobilinogen Urine Normal      Nitrite Urine Negative      Leukocyte Esterase Urine Negative      RBC Urine >182 (*)     WBC Urine 0     COMPREHENSIVE METABOLIC PANEL - Abnormal    Sodium 139      Potassium 4.3      Chloride 102      Carbon Dioxide (CO2) 22      Anion Gap 15      Urea Nitrogen 27.6 (*)     Creatinine 0.94      Calcium 9.5      Glucose 108 (*)     Alkaline Phosphatase 67      AST 28      ALT 17      Protein Total 6.8      Albumin 4.3      Bilirubin Total 0.3      GFR Estimate 88     HEMOGLOBIN - Abnormal    Hemoglobin 13.2 (*)    ISTAT CREATININE POCT - Normal    Creatinine POCT 1.0      GFR, ESTIMATED POCT >60     CBC WITH PLATELETS AND DIFFERENTIAL    WBC Count 8.8      RBC Count 4.86      Hemoglobin 15.0      Hematocrit 44.2      MCV 91      MCH 30.9      MCHC 33.9      RDW 12.3      Platelet Count 265      % Neutrophils 77      % Lymphocytes 13      % Monocytes 8      % Eosinophils 2      % Basophils 0      % Immature Granulocytes 0      NRBCs per 100 WBC 0      Absolute Neutrophils 6.8      Absolute Lymphocytes 1.1      Absolute Monocytes 0.7      Absolute Eosinophils 0.1      Absolute Basophils 0.0      Absolute Immature Granulocytes 0.0      Absolute NRBCs 0.0     TYPE AND SCREEN, ADULT    ABO/RH(D) O POS      Antibody Screen Negative      SPECIMEN EXPIRATION DATE [id number]     ABO/RH TYPE AND SCREEN        Abd/pelvis CT no contrast - Stone Protocol   Final Result   IMPRESSION:       1.  Multiple bladder diverticula, largest arising from the bladder dome. The largest diverticulum is diffusely thick-walled with  adjacent fat stranding. Additional smaller diverticula arising from the right bladder wall also demonstrate mild adjacent fat    stranding. Findings are compatible with infected bladder diverticula.      2.  Large amount of blood in the urinary bladder.      3.  Colonic diverticulosis and moderate prostate enlargement.             Treatments provided: 3 way catheter with continuous bladder irrigation, pain meds, abx, fluids, (see MAR)  Family Comments: n/a  OBS brochure/video discussed/provided to patient:  N/A  ED Medications:   Medications   trospium (SANCTURA) tablet 20 mg (has no administration in time range)   melatonin tablet 1 mg (has no administration in time range)   ondansetron (ZOFRAN ODT) ODT tab 4 mg (has no administration in time range)     Or   ondansetron (ZOFRAN) injection 4 mg (has no administration in time range)   sodium chloride 0.9% infusion (has no administration in time range)   acetaminophen (TYLENOL) tablet 650 mg (has no administration in time range)     Or   acetaminophen (TYLENOL) Suppository 650 mg (has no administration in time range)   prochlorperazine (COMPAZINE) injection 5 mg (has no administration in time range)     Or   prochlorperazine (COMPAZINE) tablet 5 mg (has no administration in time range)     Or   prochlorperazine (COMPAZINE) suppository 12.5 mg (has no administration in time range)   HYDROmorphone (DILAUDID) injection 1 mg (1 mg Intravenous $Given 8/21/23 0701)   cefTRIAXone (ROCEPHIN) 2 g vial to attach to  ml bag for ADULTS or NS 50 ml bag for PEDS (has no administration in time range)   cefTRIAXone (ROCEPHIN) 1 g vial to attach to  mL bag for ADULTS or NS 50 mL bag for PEDS (has no administration in time range)   0.9% sodium chloride BOLUS (0 mLs Intravenous Stopped 8/21/23 0120)   HYDROmorphone (PF) (DILAUDID) injection 0.3 mg (0.3 mg Intravenous $Given 8/21/23 2465)   lidocaine (XYLOCAINE) 2 % external gel 6 mL (6 mLs Urethral $Given 8/21/23 7003)    HYDROmorphone (PF) (DILAUDID) injection 0.5 mg (0.5 mg Intravenous $Given 8/21/23 0544)   cefTRIAXone (ROCEPHIN) 1 g vial to attach to  mL bag for ADULTS or NS 50 mL bag for PEDS (0 g Intravenous Stopped 8/21/23 0634)   lidocaine (XYLOCAINE) 2 % external gel ( Urethral $Given 8/21/23 0634)   HYDROmorphone (DILAUDID) injection 1 mg (1 mg Intravenous $Given 8/21/23 0634)       Drips infusing:  No  For the majority of the shift this patient was Green.   Interventions performed were n/a.    Sepsis treatment initiated: No    Cares/treatment/interventions/medications to be completed following ED care: CBI, urology consult, pain control, abx    ED Nurse Name: Andi Paige RN  7:16 AM  RECEIVING UNIT ED HANDOFF REVIEW    Above ED Nurse Handoff Report was reviewed: Yes  Reviewed by: Behzad Mathews RN on August 21, 2023 at 7:50 AM

## 2023-08-21 NOTE — ANESTHESIA PREPROCEDURE EVALUATION
Anesthesia Pre-Procedure Evaluation    Patient: Roni Rebolledo   MRN: [id number] : 1955        Procedure : Procedure(s):  CYSTOSCOPY, WITH FULGURATION OF HEMORRHAGING BLOOD VESSEL AND THROMBUS REMOVAL          Past Medical History:   Diagnosis Date    Anxiety 2009    Ascending aortic aneurysm 2015    Benign prostatic hyperplasia     increased PSA    Bladder diverticulum     Dr Duke    Carpal tunnel syndrome on both sides     Hypertension 2013    Immunization deficiency 2015    Lipoma 2023    retroperitoneal - 8.4 cm    Liver mass 2023    2.4 cm    Lumbar radiculopathy 2016    Onychomycosis 2016      Past Surgical History:   Procedure Laterality Date    ARTHROSCOPY KNEE WITH LATERAL MENISCECTOMY      left    ARTHROSCOPY SHOULDER      bilateral - rotator cuff repair -     COLONOSCOPY N/A 2015    Procedure: COLONOSCOPY;  Surgeon: Mariano Vigil MD;  Location:  GI    COLONOSCOPY  2019    tubular adenoma - due 5 yrs    ORTHOPEDIC SURGERY      ACL repair left    PROSTATE BIOPSY, NEEDLE, SATURATION SAMPLING      SURGICAL HISTORY OF -       anal fissure    SURGICAL HISTORY OF -   2021    Dr Rojas, prostate artery embolization      Allergies   Allergen Reactions    Lisinopril Cough      Social History     Tobacco Use    Smoking status: Former     Packs/day: 0.25     Years: 7.00     Pack years: 1.75     Types: Cigarettes     Quit date: 1/3/1980     Years since quittin.6    Smokeless tobacco: Never   Substance Use Topics    Alcohol use: Yes     Alcohol/week: 3.0 standard drinks of alcohol     Types: 3 Standard drinks or equivalent per week      Wt Readings from Last 1 Encounters:   23 82.5 kg (181 lb 14.1 oz)        Anesthesia Evaluation   Pt has had prior anesthetic. Type: General.    No history of anesthetic complications       ROS/MED HX  ENT/Pulmonary:  - neg pulmonary ROS     Neurologic:  - neg neurologic ROS      Cardiovascular: Comment: No antihypertensives    (+)  hypertension- -   -  - -                                      METS/Exercise Tolerance:     Hematologic:  - neg hematologic  ROS     Musculoskeletal:  - neg musculoskeletal ROS     GI/Hepatic:  - neg GI/hepatic ROS     Renal/Genitourinary:  - neg Renal ROS     Endo:  - neg endo ROS     Psychiatric/Substance Use:  - neg psychiatric ROS     Infectious Disease:  - neg infectious disease ROS     Malignancy:  - neg malignancy ROS     Other:  - neg other ROS          Physical Exam    Airway        Mallampati: I   TM distance: > 3 FB   Neck ROM: full   Mouth opening: > 3 cm    Respiratory Devices and Support         Dental  no notable dental history         Cardiovascular   cardiovascular exam normal          Pulmonary   pulmonary exam normal                OUTSIDE LABS:  CBC:   Lab Results   Component Value Date    WBC 8.8 08/21/2023    WBC 4.9 06/29/2023    HGB 12.8 (L) 08/21/2023    HGB 13.2 (L) 08/21/2023    HCT 44.2 08/21/2023    HCT 45.3 06/29/2023     08/21/2023     06/29/2023     BMP:   Lab Results   Component Value Date     08/21/2023     06/29/2023    POTASSIUM 4.3 08/21/2023    POTASSIUM 4.3 06/29/2023    CHLORIDE 102 08/21/2023    CHLORIDE 104 06/29/2023    CO2 22 08/21/2023    CO2 24 06/29/2023    BUN 27.6 (H) 08/21/2023    BUN 23.0 06/29/2023    CR 1.0 08/21/2023    CR 0.94 08/21/2023     (H) 08/21/2023    GLC 95 06/29/2023     COAGS:   Lab Results   Component Value Date    INR 0.99 07/03/2013     POC: No results found for: BGM, HCG, HCGS  HEPATIC:   Lab Results   Component Value Date    ALBUMIN 4.3 08/21/2023    PROTTOTAL 6.8 08/21/2023    ALT 17 08/21/2023    AST 28 08/21/2023    ALKPHOS 67 08/21/2023    BILITOTAL 0.3 08/21/2023     OTHER:   Lab Results   Component Value Date    A1C 5.8 05/06/2015    ANA 9.5 08/21/2023    LIPASE 22 06/29/2023    AMYLASE 135 (H) 06/29/2023    TSH 1.63 11/09/2022    SED 4 06/29/2023        Anesthesia Plan    ASA Status:  2    NPO Status:  NPO Appropriate    Anesthesia Type: General.     - Airway: LMA   Induction: Intravenous, Propofol.   Maintenance: Balanced.        Consents    Anesthesia Plan(s) and associated risks, benefits, and realistic alternatives discussed. Questions answered and patient/representative(s) expressed understanding.     - Discussed:     - Discussed with:  Patient            Postoperative Care    Pain management: IV analgesics, Oral pain medications, Multi-modal analgesia.   PONV prophylaxis: Ondansetron (or other 5HT-3), Dexamethasone or Solumedrol     Comments:                Mariano Putnam MD

## 2023-08-21 NOTE — PROGRESS NOTES
[facility]  Hospitalist Progress Note  Burak Shukla MD 08/21/23    Reason for Stay (Diagnosis): Hematuria         Assessment and Plan:      Summary of Stay: Roni Rebolledo is a 68 year old male with past medical history including BPH, bladder diverticuli, hypertension, ascending aortic aneurysm admitted on 8/21/2023 with gross hematuria due to suspected traumatic self-catheterization.  He does have a history of BPH with prostate artery embolism in 2021.  He was seen most recently by urology on 8/10/2023 and underwent cystoscopy which showed trilobar hypertrophy as well as multiple bladder diverticuli and inability to completely empty his bladder.  He presented here on 8/21 after having more difficulty with catheterization needing to use more force and resultant return of multiple clots.    He presented here with normal white blood count, hemoglobin of 15.9 which has gradually trended down and abnormal urine analysis with large blood but negative nitrite and negative leukocyte esterase.  Imaging included a CT scan of his abdomen and pelvis without contrast which showed multiple bladder diverticula with evidence of possible infected bladder diverticula.    He was started on continuous bladder irrigation, IV ceftriaxone and admitted for urologic consultation and possible intervention.    Problem List/Assessment and Plan:   Gross hematuria in the setting of BPH, incomplete bladder emptying and suspected traumatic self-catheterization: As above.  --Continue CBI as directed by urology, hand irrigate as needed.  --Per urology plan to go to the operating room later today for cystoscopy and clot evacuation  --Empirically covering for UTI with ceftriaxone, unclear if there is a true infection here.  --Remains on Flomax, trospium twice daily as needed  -- Recheck hemoglobin this afternoon and in the morning.    2.   History of hypertension: Historical diagnosis but no home medications noted.    3.    "History of ascending aortic aneurysm: No apparent acute issues.    4.  Acute blood loss anemia due to urinary bleeding:   -- Recheck CBC in the morning, transfuse for hemoglobin less than 7 or hemodynamic instability.  -- Type and screen done in the ER.    DVT Prophylaxis: Pneumatic Compression Devices  Code Status: Full Code  Discharge Dispo/Date: Pending urologic intervention and cessation of bleeding.    Clinically Significant Risk Factors Present on Admission                  # Hypertension: Noted on problem list      # Overweight: Estimated body mass index is 29.36 kg/m  as calculated from the following:    Height as of this encounter: 1.676 m (5' 6\").    Weight as of this encounter: 82.5 kg (181 lb 14.1 oz).                    Interval History (Subjective):      I assumed care today, Jorge continues to have fairly brisk bleeding.  Was being hand irrigated by urology upon my arrival to the room.  Continued on CBI thereafter    Plans for cystoscopy today    Having bladder spasms, treating pain as able.                  Physical Exam:      Last Vital Signs:  BP (!) 146/89 (BP Location: Left arm)   Pulse 91   Temp 97.8  F (36.6  C) (Oral)   Resp 18   Ht 1.676 m (5' 6\")   Wt 82.5 kg (181 lb 14.1 oz)   SpO2 97%   BMI 29.36 kg/m      No intake/output data recorded.    General: Alert, awake, no acute distress.  HEENT: NC/AT, eyes anicteric, external occular movements intact, face symmetric.  Dentition WNL, MM moist.  Cardiac: RRR, S1, S2.  No murmurs appreciated.  Pulmonary: Normal chest rise, normal work of breathing.  Lungs CTA BL  Abdomen: soft, non-tender, non-distended.  Bowel Sounds Present.  No guarding.  Extremities: no deformities.  Warm, well perfused.  Skin: no rashes or lesions noted.  Warm and Dry.  Neuro: No focal deficits noted.  Speech clear.  Coordination and strength grossly normal.  Psych: Appropriate affect.         Medications:      All current medications were reviewed with changes " reflected in problem list.         Data:      All new lab and imaging data was reviewed.   Labs:  Recent Labs   Lab 08/21/23  0359 08/21/23  0354   NA  --  139   POTASSIUM  --  4.3   CHLORIDE  --  102   CO2  --  22   ANIONGAP  --  15   GLC  --  108*   BUN  --  27.6*   CR 1.0 0.94   GFRESTIMATED >60 88   ANA  --  9.5     Recent Labs   Lab 08/21/23  1233 08/21/23  0519 08/21/23  0354   WBC  --   --  8.8   HGB 12.8*   < > 15.0   HCT  --   --  44.2   MCV  --   --  91   PLT  --   --  265    < > = values in this interval not displayed.      Imaging:   Recent Results (from the past 48 hour(s))   Abd/pelvis CT no contrast - Stone Protocol    Narrative    EXAM: CT ABDOMEN PELVIS W/O CONTRAST  LOCATION: [facility]  DATE: 8/21/2023    INDICATION: flank pain  COMPARISON: Several 3 2023  TECHNIQUE: CT scan of the abdomen and pelvis was performed without IV contrast. Multiplanar reformats were obtained. Dose reduction techniques were used.  CONTRAST: None.    FINDINGS:   LOWER CHEST: Normal.    HEPATOBILIARY: Normal. Multiple hepatic cysts are better demonstrated on prior contrast-enhanced study.    PANCREAS: Normal.    SPLEEN: Normal.    ADRENAL GLANDS: Normal.    KIDNEYS/BLADDER: A cyst is again seen in the left kidney measuring 2.5 cm, unchanged and does not require dedicated imaging follow-up. No urinary stones or hydronephrosis. A Hartman catheter is seen within the urinary bladder containing large amount of   high density material compatible with blood product. A bladder diverticulum is again seen arising from the bladder dome with wall thickening and adjacent fat stranding. Additional small diverticula also seen arising from the right bladder wall, also with   mild adjacent fat stranding.    BOWEL: Scattered sigmoid diverticula present without acute diverticulitis. No bowel wall thickening or obstruction. Appendix is normal.    LYMPH NODES: Normal.    VASCULATURE: Unremarkable.    PELVIC ORGANS:  Moderate enlargement of the prostate gland redemonstrated, unchanged.    MUSCULOSKELETAL: Multilevel mild and moderate degenerative disc space narrowing seen in the lumbar spine. No suspicious osseous lesions or acute fractures..        Impression    IMPRESSION:     1.  Multiple bladder diverticula, largest arising from the bladder dome. The largest diverticulum is diffusely thick-walled with adjacent fat stranding. Additional smaller diverticula arising from the right bladder wall also demonstrate mild adjacent fat   stranding. Findings are compatible with infected bladder diverticula.    2.  Large amount of blood in the urinary bladder.    3.  Colonic diverticulosis and moderate prostate enlargement.           Burak Shukla MD     I've spent 50 minutes in chart review, ordering medications and tests, obtaining additional history as needed, evaluating the patient and in documentation for this encounter.

## 2023-08-21 NOTE — TELEPHONE ENCOUNTER
Patient calling reports being seen 8/10 in Urology for urinary retention with concerns returning. Patient reports the bladder is not emptying and when self catheterizing the patient notes bleeding coming from the urethra. Patient reports now passing pure blood. Reports this is likely due to the prostate being so big. Denies inability to urinate and shock. Advised to be seen in the ER per protocol with patient agreeable to the plan.     Swapna Ricci RN 08/21/23 2:59 AM   [facility] Triage Nurse Advisor    Reason for Disposition   Passing pure blood or large blood clots (i.e., size > a dime) (Exception: mirtha or small strands)    Additional Information   Negative: Shock suspected (e.g., cold/pale/clammy skin, too weak to stand, low BP, rapid pulse)   Negative: Sounds like a life-threatening emergency to the triager   Negative: Urinary catheter, questions about   Negative: Recent back or abdominal injury   Negative: Recent genital injury   Negative: [1] Unable to urinate (or only a few drops) > 4 hours AND [2] bladder feels very full (e.g., palpable bladder or strong urge to urinate)    Protocols used: Urine - Blood In-A-

## 2023-08-21 NOTE — ED PROVIDER NOTES
History     Chief Complaint:  Hematuria       HPI   Roni Rebolledo is a 68 year old male with a history of anxiety, hypertension, BPH, liver mass and ascending aortic aneurysm presenting with hematuria.  Patient reports a history of urinary retention and was recently seen by urology on 8/10/2023 at [facility].  He underwent a cystoscopy at that time with noted bladder diverticulum. He was taught to self catheterize.  Patient reports since that appointment he has not self catheterize.  Over the past few days he has noted increasing lower abdominal pain as well as urinary hesitancy.  Around 1 AM this evening he did attempt his first self-catheterization and noted resistance with placement of a catheter and then a large clot with associated hematuria followed.  He reports feeling unwell with accompanying nausea as well as back pain.  No history of anticoagulation.  He reports compliance with flomax.    Independent Historian:   None - Patient Only    Review of External Notes:    8/10/23 urology note      Medications:    tamsulosin (FLOMAX) 0.4 MG capsule        Past Medical History:    Past Medical History:   Diagnosis Date    Anxiety 12/23/2009    Ascending aortic aneurysm 05/06/2015    Benign prostatic hyperplasia     Carpal tunnel syndrome on both sides     Hypertension 12/23/2013    Immunization deficiency 05/06/2015    Lipoma 06/2023    Liver mass 06/2023    Lumbar radiculopathy 05/17/2016    Onychomycosis 05/17/2016       Past Surgical History:    Past Surgical History:   Procedure Laterality Date    ARTHROSCOPY KNEE WITH LATERAL MENISCECTOMY  2013    left    ARTHROSCOPY SHOULDER      bilateral - rotator cuff repair - 1990's    COLONOSCOPY N/A 05/29/2015    Procedure: COLONOSCOPY;  Surgeon: Mariano Vigil MD;  Location:  GI    COLONOSCOPY  07/2019    tubular adenoma - due 5 yrs    ORTHOPEDIC SURGERY  2013    ACL repair left    PROSTATE BIOPSY, NEEDLE, SATURATION SAMPLING      SURGICAL  "HISTORY OF -   1999    anal fissure    SURGICAL HISTORY OF -   05/2021    Dr Rojas, prostate artery embolization        Physical Exam   Patient Vitals for the past 24 hrs:   BP Temp Temp src Pulse Resp SpO2 Height Weight   08/21/23 0314 (!) 172/109 98  F (36.7  C) Temporal 83 18 97 % 1.676 m (5' 6\") 83.9 kg (185 lb)        Physical Exam  Nursing note and vitals reviewed.  Constitutional: Well nourished.  Eyes: Conjunctiva normal.  Pupils are equal, round, and reactive to light.   ENT: Nose normal. Mucous membranes pink and moist.    Neck: Normal range of motion.  CVS: Normal rate, regular rhythm.  Normal heart sounds.    Pulmonary: Lungs clear to auscultation bilaterally. No wheezes/rales/rhonchi.  GI: Abdomen soft. Suprapubic tenderness and mild distention. No rigidity or guarding. Bilateral CVA tenderness  : Circumcised.  No testicular tenderness or masses. Normal penis, no penile discharge.  Chaperone RN Isack  MSK: No calf tenderness or swelling.  Neuro: Alert. Follows simple commands.  Skin: Skin is warm and dry. No rash noted.   Psychiatric: Normal affect.     Emergency Department Course       Imaging:  Abd/pelvis CT no contrast - Stone Protocol   Final Result   IMPRESSION:       1.  Multiple bladder diverticula, largest arising from the bladder dome. The largest diverticulum is diffusely thick-walled with adjacent fat stranding. Additional smaller diverticula arising from the right bladder wall also demonstrate mild adjacent fat    stranding. Findings are compatible with infected bladder diverticula.      2.  Large amount of blood in the urinary bladder.      3.  Colonic diverticulosis and moderate prostate enlargement.            Report per radiology    Laboratory:  Labs Ordered and Resulted from Time of ED Arrival to Time of ED Departure   ROUTINE UA WITH MICROSCOPIC REFLEX TO CULTURE - Abnormal       Result Value    Color Urine Dark Red (*)     Appearance Urine Cloudy (*)     Glucose Urine Negative   "    Bilirubin Urine Negative      Ketones Urine Trace (*)     Specific Gravity Urine 1.025      Blood Urine Large (*)     pH Urine 7.0      Protein Albumin Urine 600 (*)     Urobilinogen Urine Normal      Nitrite Urine Negative      Leukocyte Esterase Urine Negative      RBC Urine >182 (*)     WBC Urine 0     COMPREHENSIVE METABOLIC PANEL - Abnormal    Sodium 139      Potassium 4.3      Chloride 102      Carbon Dioxide (CO2) 22      Anion Gap 15      Urea Nitrogen 27.6 (*)     Creatinine 0.94      Calcium 9.5      Glucose 108 (*)     Alkaline Phosphatase 67      AST 28      ALT 17      Protein Total 6.8      Albumin 4.3      Bilirubin Total 0.3      GFR Estimate 88     HEMOGLOBIN - Abnormal    Hemoglobin 13.2 (*)    ISTAT CREATININE POCT - Normal    Creatinine POCT 1.0      GFR, ESTIMATED POCT >60     CBC WITH PLATELETS AND DIFFERENTIAL    WBC Count 8.8      RBC Count 4.86      Hemoglobin 15.0      Hematocrit 44.2      MCV 91      MCH 30.9      MCHC 33.9      RDW 12.3      Platelet Count 265      % Neutrophils 77      % Lymphocytes 13      % Monocytes 8      % Eosinophils 2      % Basophils 0      % Immature Granulocytes 0      NRBCs per 100 WBC 0      Absolute Neutrophils 6.8      Absolute Lymphocytes 1.1      Absolute Monocytes 0.7      Absolute Eosinophils 0.1      Absolute Basophils 0.0      Absolute Immature Granulocytes 0.0      Absolute NRBCs 0.0     TYPE AND SCREEN, ADULT    ABO/RH(D) O POS      Antibody Screen Negative      SPECIMEN EXPIRATION DATE [id number]     ABO/RH TYPE AND SCREEN            Emergency Department Course & Assessments:    Interventions:  Medications   0.9% sodium chloride BOLUS (has no administration in time range)   HYDROmorphone (PF) (DILAUDID) injection 0.3 mg (has no administration in time range)   lidocaine (XYLOCAINE) 2 % external gel 6 mL (has no administration in time range)          Independent Interpretation (X-rays, CTs, rhythm strip):  None    Consultations/Discussion of  Management or Tests:  Dr. Shin, hospitalist  Dr. Duke, urologist       Social Determinants of Health affecting care:   None    Disposition:  The patient was admitted to the hospital under the care of Dr. Shin.     Impression & Plan        Medical Decision Making:  Patient is a 68-year-old male presenting with predominantly abdominal pain and hematuria.  He is quite uncomfortable appearing on arrival.  He was found to be retaining urine greater than 600 cc on arrival.  Given large clot burden decision was made to place three-way Hartman catheter for continuous bladder irrigation (CBI).  Unfortunately there was some resistance with the three-way catheter, consulted urology who recommended replacing the three-way catheter with manual irrigation followed by CBI attempt.  CT does show concerns for infected bladder diverticuli today and blood in the bladder though no obstructive uropathy.  Patient was given a dose of IV Rocephin.  Hemoglobin stable, slightly down trended on repeat hemoglobin though I suspect in part this is dilutional.  Patient is excepted by hospitalist for admission and remained hemodynamically stable.      Diagnosis:    ICD-10-CM    1. Urinary retention  R33.9       2. Bladder diverticulum  N32.3     infected           Discharge Medications:  New Prescriptions    No medications on file      8/21/2023   Charley Mejia, Charley Bender,   08/21/23 0619

## 2023-08-21 NOTE — ED TRIAGE NOTES
Pt reports he self caths. Pt started self cathing himself on the 10th. Pt reports since Wednesday he has been having urine retention. Pt report hematuria today. Pt having trouble placing the catheter

## 2023-08-21 NOTE — ED NOTES
Hartman emptied and CBI saline exchanged prior to leaving floor for admit to hospital. Patient denies any pain and is resting comfortably in bed.

## 2023-08-21 NOTE — CONSULTS
[facility] Consultation by [facility]    Roni Rebolledo MRN# [id number]   Age: 68 year old YOB: 1955     Date of Admission:  8/21/2023    Reason for consult: Traumatic hematuria 2/3 self cath. Dr. Elizabeth's patient.       Requesting PA/MD: Dr. Robin        Level of consult: Consult, follow and place orders           Assessment and Plan:   Assessment:   Gross hematuria  BPH with significant trilobar hypertrophy and previous PAE  Incomplete bladder emptying      Plan:   -NPO.  -Continue with Hartman catheter and CBI.  Do not let CBI run dry.  -Hand irrigate Hartman catheter, as needed, for clots and slowed output.  -Plan to go to the OR later today for cystoscopy and clot evacuation.  -Monitor hemoglobin.  Transfuse as necessary.  -Continue with IV Rocephin.  Follow cultures.  Transition to culture specific as available and oral as clinically appropriate.  -Trospium 20 mg twice daily is reasonable for bladder spasms at this time due to clots.  -Flomax 0.4 mg one daily.  -We will need to inform a more definitive plan for bladder management in the future, as concern for patient performing clean remittent catheterization.  -Pain and nausea management per primary service.  -We will continue to follow along.    Zenia Jiang PA-C   [facility]  [phone]               Chief Complaint:   Gross hematuria, urinary retention, BPH     History is obtained from the patient and EMR.         History of Present Illness:   This patient is a 68 year old male who presented to the emergency department overnight due to gross hematuria.  Medical history is significant for BPH status post prostate artery embolism in 2021, liver mass, ascending aortic aneurysm, anxiety, and hypertension.  Patient has a history of difficulties with urination including intermittent urinary retention.  He underwent a prostate artery embolism in 2021, which was ordered by Dr. Ochoa.  Prior to surgical intervention, he  was estimated to have a very enlarged prostate of approximately 140 g.  He had been interested in possible Rezum, but this is not recommended given his prostate size.    Patient was seen in [facility] urology clinic in [place] on 08/10/2023.  He underwent cystoscopy which showed trilobar hypertrophy with a significant median lobe.  Patient had multiple bladder diverticuli and was unable to empty his bladder.  He had been seen by them previously on 07/20/2023.    Patient has been having abdominal discomfort.  He was taught clean intermittent catheterization.  He notes that he he did not start clean intermittent catheterization until attempt yesterday.  He was having more hesitancy and attempted catheterization.  He notes that initially went in well, but he had to push it after that point.  It resulted in passage of multiple clots.    Patient also has a history of elevated PSA.  Most recent PSA November 2022 was 8.38.  He underwent a saturation prostate biopsy approximately 15 years ago, which was negative.    He does not take any blood thinning medications.  Former smoker.  He quit in his 20s.    Urinalysis is not particularly convincing for infection, but CT imaging showed concern for an infected diverticulum.  He was started on Rocephin.  Urine culture is currently pending.  CT imaging showed significant amount of clot within the bladder.  CBI was started.      3-way Hartman catheter in place draining thick bloody urine.  CBI was on slow rate initially.  Hand irrigate with 4.5 L of sterile saline with return of innumerable clots.  CBI restarted at fast rate.  Did have reoccurrence of bleeding, was able to be resolved.    Patient did have a prostate MRI in 6/20219, ordered by Dr. Pruett for a PSA of 11.5.  Prostate was 140 grams at that time, prior to PAE in 2021.  PI-RADS2.    Patient noted that he had not been having nocturia.  It sounds as though at some point he did have a urodynamic study that shows that he  has poor bladder contractility.  He currently denies any fevers, chills, vomiting, shortness of breath, or chest pain.  Hemoglobin 13.2 (15.0).  Denied dysuria.         Past Medical History:     Past Medical History:   Diagnosis Date    Anxiety 2009    Ascending aortic aneurysm 2015    Benign prostatic hyperplasia     increased PSA    Bladder diverticulum     Dr Duke    Carpal tunnel syndrome on both sides     Hypertension 2013    Immunization deficiency 2015    Lipoma 2023    retroperitoneal - 8.4 cm    Liver mass 2023    2.4 cm    Lumbar radiculopathy 2016    Onychomycosis 2016             Past Surgical History:     Past Surgical History:   Procedure Laterality Date    ARTHROSCOPY KNEE WITH LATERAL MENISCECTOMY      left    ARTHROSCOPY SHOULDER      bilateral - rotator cuff repair -     COLONOSCOPY N/A 2015    Procedure: COLONOSCOPY;  Surgeon: Mariano Vigil MD;  Location:  GI    COLONOSCOPY  2019    tubular adenoma - due 5 yrs    ORTHOPEDIC SURGERY      ACL repair left    PROSTATE BIOPSY, NEEDLE, SATURATION SAMPLING      SURGICAL HISTORY OF -       anal fissure    SURGICAL HISTORY OF -   2021    Dr Rojas, prostate artery embolization             Social History:     Social History     Tobacco Use    Smoking status: Former     Packs/day: 0.25     Years: 7.00     Pack years: 1.75     Types: Cigarettes     Quit date: 1/3/1980     Years since quittin.6    Smokeless tobacco: Never   Substance Use Topics    Alcohol use: Yes     Alcohol/week: 3.0 standard drinks of alcohol     Types: 3 Standard drinks or equivalent per week             Family History:     Family History   Problem Relation Age of Onset    Other Cancer Mother         brain CA    Dementia Father         dementia    Coronary Artery Disease Brother     Brain Cancer Daughter          at age 2    Diabetes Paternal Aunt     Colon Cancer No family hx of      Family history  "reviewed.             Allergies:     Allergies   Allergen Reactions    Lisinopril Cough             Medications:     Current Facility-Administered Medications   Medication    acetaminophen (TYLENOL) tablet 650 mg    Or    acetaminophen (TYLENOL) Suppository 650 mg    [START ON 8/22/2023] cefTRIAXone (ROCEPHIN) 2 g vial to attach to  ml bag for ADULTS or NS 50 ml bag for PEDS    HYDROmorphone (DILAUDID) injection 1 mg    melatonin tablet 1 mg    ondansetron (ZOFRAN ODT) ODT tab 4 mg    Or    ondansetron (ZOFRAN) injection 4 mg    prochlorperazine (COMPAZINE) injection 5 mg    Or    prochlorperazine (COMPAZINE) tablet 5 mg    Or    prochlorperazine (COMPAZINE) suppository 12.5 mg    sodium chloride 0.9% infusion    trospium (SANCTURA) tablet 20 mg             Review of Systems:   A comprehensive 10-point review of systems was performed and found to be negative except as described in the HPI.     BP (!) 146/89 (BP Location: Left arm)   Pulse 91   Temp 97.8  F (36.6  C) (Oral)   Resp 18   Ht 1.676 m (5' 6\")   Wt 82.5 kg (181 lb 14.1 oz)   SpO2 97%   BMI 29.36 kg/m    PSYCH: NAD  EYES: EOMI  MOUTH: MMM  NECK: Supple, no notable adenopathy  RESP: Unlabored breathing  CARDIAC: No LE edema, regular radial pulse.  SKIN: Warm, no rashes  ABD: soft, slightly tender suprapubic area  NEURO: AAO x3  URO: 3-way Hartman catheter in place draining thick bloody urine.  CBI was on slow rate initially.  Hand irrigate with 4.5 L of sterile saline with return of innumerable clots.  CBI restarted at fast rate.  Did have reoccurrence of bleeding, was able to be resolved.         Data:     Lab Results   Component Value Date    WBC 8.8 08/21/2023    HGB 13.2 (L) 08/21/2023    HCT 44.2 08/21/2023    MCV 91 08/21/2023     08/21/2023     Lab Results   Component Value Date    CR 1.0 08/21/2023    CR 0.94 08/21/2023     Recent Labs   Lab 08/21/23  0432   COLOR Dark Red*   APPEARANCE Cloudy*   URINEGLC Negative   URINEBILI " Negative   URINEKETONE Trace*   SG 1.025   URINEPH 7.0   PROTEIN 600*   NITRITE Negative   LEUKEST Negative   RBCU >182*   WBCU 0     Urine culture: in process.    Abd/pelvis CT no contrast - Stone Protocol    Result Date: 8/21/2023  EXAM: CT ABDOMEN PELVIS W/O CONTRAST LOCATION: [facility] DATE: 8/21/2023 INDICATION: flank pain COMPARISON: Several 3 2023 TECHNIQUE: CT scan of the abdomen and pelvis was performed without IV contrast. Multiplanar reformats were obtained. Dose reduction techniques were used. CONTRAST: None. FINDINGS: LOWER CHEST: Normal. HEPATOBILIARY: Normal. Multiple hepatic cysts are better demonstrated on prior contrast-enhanced study. PANCREAS: Normal. SPLEEN: Normal. ADRENAL GLANDS: Normal. KIDNEYS/BLADDER: A cyst is again seen in the left kidney measuring 2.5 cm, unchanged and does not require dedicated imaging follow-up. No urinary stones or hydronephrosis. A Hartman catheter is seen within the urinary bladder containing large amount of high density material compatible with blood product. A bladder diverticulum is again seen arising from the bladder dome with wall thickening and adjacent fat stranding. Additional small diverticula also seen arising from the right bladder wall, also with  mild adjacent fat stranding. BOWEL: Scattered sigmoid diverticula present without acute diverticulitis. No bowel wall thickening or obstruction. Appendix is normal. LYMPH NODES: Normal. VASCULATURE: Unremarkable. PELVIC ORGANS: Moderate enlargement of the prostate gland redemonstrated, unchanged. MUSCULOSKELETAL: Multilevel mild and moderate degenerative disc space narrowing seen in the lumbar spine. No suspicious osseous lesions or acute fractures..     IMPRESSION: 1.  Multiple bladder diverticula, largest arising from the bladder dome. The largest diverticulum is diffusely thick-walled with adjacent fat stranding. Additional smaller diverticula arising from the right bladder wall also  demonstrate mild adjacent fat  stranding. Findings are compatible with infected bladder diverticula. 2.  Large amount of blood in the urinary bladder. 3.  Colonic diverticulosis and moderate prostate enlargement.

## 2023-08-21 NOTE — H&P
"[facility]    History and Physical - Hospitalist Service       Date of Admission:  8/21/2023    Assessment & Plan      Roni Rebolledo is a 68 year old male admitted on 8/21/2023. He as a past medical history of ascending aortic aneurysm, hypertension, dyslipidemia, benign prostatic hypertrophy, bladder diverticuli as identified on cystoscopy on August 10, urinary retention related to BPH.  Patient is being admitted for what sounds like traumatic hematuria secondary to failed self-catheterization attempt  1/.  Gross hematuria secondary to likely a traumatic self-catheterization.  Will require continuous bladder irrigation.  Have requested urology consult.  I have advised nursing not to forcibly catheterize the patient as this is going to make matters worse  2/.  Concerns for infected bladder diverticuli patient has received only 1 g of IV ceftriaxone in the ER will add another gram and administer 2 g daily with the possibility of pending cystoscopy.  3/.  Pain control this will be key over here unfortunately belladonna suppositories are not available hence we will use IV hydromorphone and trospium.  /.  Acute blood loss anemia definitely a concern and has had a 2 g drop in hemoglobin but not dangerous or concerning we will need to continue to monitor     Diet: NPO for Medical/Clinical Reasons Except for: Ice Chips, Meds    DVT Prophylaxis: Not indicated because of patient's active bleeding  Hartman Catheter: PRESENT, indication:    Lines: None     Cardiac Monitoring: None  Code Status: Full Code      Clinically Significant Risk Factors Present on Admission                  # Hypertension: Noted on problem list      # Overweight: Estimated body mass index is 29.86 kg/m  as calculated from the following:    Height as of this encounter: 1.676 m (5' 6\").    Weight as of this encounter: 83.9 kg (185 lb).              Disposition Plan      Expected Discharge Date: To be determined               Martha " MD Lobito  Hospitalist Service  [facility]  Securely message with [employer] (more info)  Text page via [employer] Paging/Directory     ______________________________________________________________________    Chief Complaint   Blood in the urine    History is obtained from the patient, electronic health record, and emergency department physician    History of Present Illness   Roni Rebolledo is a 68 year old male who has a past medical history of ascending aortic aneurysm, hypertension, dyslipidemia, benign prostatic hypertrophy, bladder diverticuli as identified on cystoscopy on August 10, urinary retention related to BPH.  Patient is being admitted through the emergency department where he presented with above issue.  According the patient he was having issues of urinary retention on an intermittent basis, he lives in [place] but was able to find an appointment in [facility] to see the urologist who he did see on July 20 and then on the 10th of this month (Dr. Elizabeth) he underwent a cystoscopy, which revealed multiple bladder diverticuli as well as enlarged prostate.  Because of urinary retention he was taught self-catheterization which he performed once on 10 August in the presence of the nurse in the clinic and then subsequently he decided to undertake the same in the middle of the night because of sensation of incomplete emptying of bladder.  He states that when he was undertaking the catheterization he felt sudden resistance so he went to the catheter and as instructed try to bend over and forcefully insert the catheter into the bladder.  As a consequence he had a large amount of bleeding and that prompted him to come to the ER.  Work-up in the ER revealed him to have drop in his hemoglobin by about 2 g, he also was subject to a CT scan of the abdomen pelvis which revealed possibility of infected diverticuli and an enlarged prostate.  An attempt to place a three-way Hartman catheter  was partially successful because there was a large amount of blood spewing around the orifice of the catheter at the penis.  Additionally patient had ongoing discomfort and was administered some IV fluids as well as hydromorphone.  The ER provider apparently tried to contact the urologist but had not heard back and decided to admit the patient to the hospitalist service.  At the time of my evaluation of the patient in the emergency room he is in extreme discomfort.  The nurse was trying to remove the current catheter and place a larger 1 and the current catheter is apparently difficult to remove.  Patient tells me that he was given the option of undergoing a prostate surgery which she has opted against.  He wants everything to be fixed and sorted out by the first week of September so that he can go to a [employer] tournament and then subsequently go down to [place] for the winter          Past Medical History    Past Medical History:   Diagnosis Date    Anxiety 12/23/2009    Ascending aortic aneurysm 05/06/2015    Benign prostatic hyperplasia     increased PSA    Carpal tunnel syndrome on both sides     Hypertension 12/23/2013    Immunization deficiency 05/06/2015    Lipoma 06/2023    retroperitoneal - 8.4 cm    Liver mass 06/2023    2.4 cm    Lumbar radiculopathy 05/17/2016    Onychomycosis 05/17/2016       Past Surgical History   Past Surgical History:   Procedure Laterality Date    ARTHROSCOPY KNEE WITH LATERAL MENISCECTOMY  2013    left    ARTHROSCOPY SHOULDER      bilateral - rotator cuff repair - 1990's    COLONOSCOPY N/A 05/29/2015    Procedure: COLONOSCOPY;  Surgeon: Mariano Vigil MD;  Location:  GI    COLONOSCOPY  07/2019    tubular adenoma - due 5 yrs    ORTHOPEDIC SURGERY  2013    ACL repair left    PROSTATE BIOPSY, NEEDLE, SATURATION SAMPLING      SURGICAL HISTORY OF -   1999    anal fissure    SURGICAL HISTORY OF -   05/2021    Dr Rojas, prostate artery embolization       Prior to Admission  Medications   Prior to Admission Medications   Prescriptions Last Dose Informant Patient Reported? Taking?   tamsulosin (FLOMAX) 0.4 MG capsule   No No   Sig: Take 1 capsule (0.4 mg) by mouth daily      Facility-Administered Medications: None        Review of Systems    The 10 point Review of Systems is negative other than noted in the HPI or here.     Social History   I have reviewed this patient's social history and updated it with pertinent information if needed.  Social History     Tobacco Use    Smoking status: Former     Packs/day: 0.25     Years: 7.00     Pack years: 1.75     Types: Cigarettes     Quit date: 1/3/1980     Years since quittin.6    Smokeless tobacco: Never   Vaping Use    Vaping Use: Never used   Substance Use Topics    Alcohol use: Yes     Alcohol/week: 3.0 standard drinks of alcohol     Types: 3 Standard drinks or equivalent per week    Drug use: Yes     Types: Marijuana         Family History   I have reviewed this patient's family history and updated it with pertinent information if needed.  Family History   Problem Relation Age of Onset    Other Cancer Mother         brain CA    Dementia Father         dementia    Coronary Artery Disease Brother     Brain Cancer Daughter          at age 2    Diabetes Paternal Aunt     Colon Cancer No family hx of          Allergies   Allergies   Allergen Reactions    Lisinopril Cough        Physical Exam   Vital Signs: Temp: 98  F (36.7  C) Temp src: Temporal BP: (!) 172/109 Pulse: 83   Resp: 18 SpO2: 97 % O2 Device: None (Room air)    Weight: 185 lbs 0 oz    General Appearance: Appears extremely uncomfortable  Respiratory: Diminished basal breath sounds  Cardiovascular: S1-S2  GI: Soft nondistended bowel sounds are present  Skin: No rash or lesions but there is a large amount of blood tinged urine from the penile orifice  Other: No lower extremity edema    Medical Decision Making       76 MINUTES SPENT BY ME on the date of service doing chart  review, history, exam, documentation & further activities per the note.  MANAGEMENT DISCUSSED with the following over the past 24 hours: patient and nursing   NOTE(S)/MEDICAL RECORDS REVIEWED over the past 24 hours:         Data     I have personally reviewed the following data over the past 24 hrs:    8.8  \   13.2 (L)   / 265     139 102 27.6 (H) /  108 (H)   4.3 22 1.0 \     ALT: 17 AST: 28 AP: 67 TBILI: 0.3   ALB: 4.3 TOT PROTEIN: 6.8 LIPASE: N/A       Imaging results reviewed over the past 24 hrs:   Recent Results (from the past 24 hour(s))   Abd/pelvis CT no contrast - Stone Protocol    Narrative    EXAM: CT ABDOMEN PELVIS W/O CONTRAST  LOCATION: [facility]  DATE: 8/21/2023    INDICATION: flank pain  COMPARISON: Several 3 2023  TECHNIQUE: CT scan of the abdomen and pelvis was performed without IV contrast. Multiplanar reformats were obtained. Dose reduction techniques were used.  CONTRAST: None.    FINDINGS:   LOWER CHEST: Normal.    HEPATOBILIARY: Normal. Multiple hepatic cysts are better demonstrated on prior contrast-enhanced study.    PANCREAS: Normal.    SPLEEN: Normal.    ADRENAL GLANDS: Normal.    KIDNEYS/BLADDER: A cyst is again seen in the left kidney measuring 2.5 cm, unchanged and does not require dedicated imaging follow-up. No urinary stones or hydronephrosis. A Hartman catheter is seen within the urinary bladder containing large amount of   high density material compatible with blood product. A bladder diverticulum is again seen arising from the bladder dome with wall thickening and adjacent fat stranding. Additional small diverticula also seen arising from the right bladder wall, also with   mild adjacent fat stranding.    BOWEL: Scattered sigmoid diverticula present without acute diverticulitis. No bowel wall thickening or obstruction. Appendix is normal.    LYMPH NODES: Normal.    VASCULATURE: Unremarkable.    PELVIC ORGANS: Moderate enlargement of the prostate gland  redemonstrated, unchanged.    MUSCULOSKELETAL: Multilevel mild and moderate degenerative disc space narrowing seen in the lumbar spine. No suspicious osseous lesions or acute fractures..        Impression    IMPRESSION:     1.  Multiple bladder diverticula, largest arising from the bladder dome. The largest diverticulum is diffusely thick-walled with adjacent fat stranding. Additional smaller diverticula arising from the right bladder wall also demonstrate mild adjacent fat   stranding. Findings are compatible with infected bladder diverticula.    2.  Large amount of blood in the urinary bladder.    3.  Colonic diverticulosis and moderate prostate enlargement.

## 2023-08-21 NOTE — OP NOTE
PREOPERATIVE DIAGNOSIS:  Gross hematuria with retention    POSTOPERATIVE DIAGNOSIS: Same with bleeder from the prostate median lobe    PROCEDURES PERFORMED:   1. Cystourethroscopy  2.  Clot evacuation  3. Fulguration of bleeder      STAFF SURGEON: Hilario Duke MD    ANESTHESIA: General    ESTIMATED BLOOD LOSS: < 10 mL.,  Clots about 500 cc evacuated    DRAINS: None    OPERATIVE INDICATIONS:   Roni Rebolledo is a(n) 68 year old male with a history of gross hematuria and retention of urine; he had been admitted overnight after a failed attempt at self-catheterization resulting in trauma and hematuria.  Several attempts to control the bleeding with CBI had failed and he was planned for procedure for clot evacuation and fulguration today.    DESCRIPTION OF PROCEDURE:   After verification of informed consent was obtained, the patient was brought to the operating room, and moved to the operating table. After adequate anesthesia was induced, the patient was repositioned in dorsal lithotomy position and prepped and draped in the usual sterile fashion. A formal timeout was performed to confirm the correct patient, procedure and operative site.     A 22-Dutch rigid cystoscope was inserted into a well lubricated urethra. We began by performing a white light cystourethroscopy.     We used a Hayes syringe and a Urovac evacuator to evacuate all clots from the bladder until it was completely cleaned.  We performed a formal cystoscopy to ensure that the bleeding vessels were identified.  The urethra was unremarkable. The ureteral orifices were in their orthotopic positions bilaterally.  The prostate showed trilobar enlargement, there are multiple diverticuli and saccules.  There was a prominent bleeding spot at 7 o'clock position at the bladder neck on the median lobe.  This was fulgurated with a rolling ball electrode.  There were other bleeding spots identified which were also fulgurated.  The bladder was re-examined under  low pressure and hemostasis was confirmed. At this point, the bladder was drained and the cystoscope withdrawn.  We then passed a 24 Bulgarian three-way Hartman catheter and connected to CBI.  The procedure was then concluded. The patient was transferred to the postanesthesia care unit in stable condition and tolerated the procedure well.    POSTOP PLAN:  1.  Admit overnight, CBI to taper in the morning, reassess for CBI clamped in the morning tomorrow  2.  Possible discharge with Hartman catheter tomorrow with follow-up in the clinic for a voiding trial    Hilario Duke MD

## 2023-08-21 NOTE — PROGRESS NOTES
Pt went down for Cysto this evening at 1645. Report given to RN. Voided x1 with small clots present. Pt also c/o bladder spasms. NPO prior to procedure.

## 2023-08-21 NOTE — ANESTHESIA CARE TRANSFER NOTE
Patient: Roni Rebolledo    Procedure: Procedure(s):  CYSTOSCOPY, WITH FULGURATION OF HEMORRHAGING BLOOD VESSEL AND THROMBUS REMOVAL       Diagnosis: Urinary retention [R33.9]  Bladder diverticulum [N32.3]  Gross hematuria [R31.0]  Benign prostatic hyperplasia with urinary retention [N40.1, R33.8]  Diagnosis Additional Information: No value filed.    Anesthesia Type:   General     Note:    Oropharynx: spontaneously breathing and oral airway in place  Level of Consciousness: drowsy  Oxygen Supplementation: face mask  Level of Supplemental Oxygen (L/min / FiO2): 6  Independent Airway: airway patency satisfactory and stable  Dentition: dentition unchanged  Vital Signs Stable: post-procedure vital signs reviewed and stable  Report to RN Given: handoff report given  Patient transferred to: PACU    Handoff Report: Identifed the Patient, Identified the Reponsible Provider, Reviewed the pertinent medical history, Discussed the surgical course, Reviewed Intra-OP anesthesia mangement and issues during anesthesia, Set expectations for post-procedure period and Allowed opportunity for questions and acknowledgement of understanding      Vitals:  Vitals Value Taken Time   BP     Temp     Pulse     Resp     SpO2         Electronically Signed By: NIECY Clinton CRNA  August 21, 2023  5:56 PM

## 2023-08-21 NOTE — PHARMACY-ADMISSION MEDICATION HISTORY
Pharmacist Admission Medication History    Admission medication history is complete. The information provided in this note is only as accurate as the sources available at the time of the update.    Medication reconciliation/reorder completed by provider prior to medication history? No    Information Source(s): Patient and CareEverywhere/SureScripts via in-person    Pertinent Information: none    Changes made to PTA medication list:  Added: Flavinoid supplement  Deleted: None  Changed: None    Medication Affordability:  Not including over the counter (OTC) medications, was there a time in the past 3 months when you did not take your medications as prescribed because of cost?: No    Allergies reviewed with patient and updates made in EHR: yes    Medication History Completed By:   Lester MárquezD, [facility]   Emergency Medicine Pharmacist  [phone] or [place]  August 21, 2023      Prior to Admission medications    Medication Sig Last Dose Taking? Auth Provider Long Term End Date   tamsulosin (FLOMAX) 0.4 MG capsule Take 1 capsule (0.4 mg) by mouth daily 8/20/2023 at am Yes Marco A Elizabeth MD     Vitamins-Lipotropics (LIPO FLAVONOID PLUS) TABS Take 6 tablets by mouth daily 8/20/2023 at am Yes Unknown, Entered By History

## 2023-08-21 NOTE — PLAN OF CARE
To Do:  End of Shift Summary  For vital signs and complete assessments, please see documentation flowsheets.     Pertinent assessments: AxOx4. VSS on RA. Pt. admitted to unit with CBI which became clogged. Staff unable to irraigate clot. Mcgill was removed until cystoscopy for clot evacuation this evefning. NS @ 50ml/hr. Pt. NPO. Indepen in room. Prn dilaudid for pain      Major Shift Events: admitted to unit, mcgill clogged then removed.     Treatment Plan: Cysto, pain management    Bedside Nurse: Behzad Mathews RN

## 2023-08-22 VITALS
DIASTOLIC BLOOD PRESSURE: 64 MMHG | BODY MASS INDEX: 29.23 KG/M2 | WEIGHT: 181.88 LBS | TEMPERATURE: 98.2 F | OXYGEN SATURATION: 98 % | RESPIRATION RATE: 20 BRPM | SYSTOLIC BLOOD PRESSURE: 108 MMHG | HEIGHT: 66 IN | HEART RATE: 80 BPM

## 2023-08-22 PROBLEM — R31.0 GROSS HEMATURIA: Status: ACTIVE | Noted: 2023-08-22

## 2023-08-22 LAB
ALBUMIN SERPL BCG-MCNC: 3.3 G/DL (ref 3.5–5.2)
ALP SERPL-CCNC: 44 U/L (ref 40–129)
ALT SERPL W P-5'-P-CCNC: 9 U/L (ref 0–70)
ANION GAP SERPL CALCULATED.3IONS-SCNC: 6 MMOL/L (ref 7–15)
AST SERPL W P-5'-P-CCNC: 17 U/L (ref 0–45)
BACTERIA UR CULT: NO GROWTH
BASOPHILS # BLD AUTO: 0 10E3/UL (ref 0–0.2)
BASOPHILS NFR BLD AUTO: 0 %
BILIRUB SERPL-MCNC: 0.3 MG/DL
BUN SERPL-MCNC: 16.1 MG/DL (ref 8–23)
CALCIUM SERPL-MCNC: 8.2 MG/DL (ref 8.8–10.2)
CHLORIDE SERPL-SCNC: 106 MMOL/L (ref 98–107)
CREAT SERPL-MCNC: 0.86 MG/DL (ref 0.67–1.17)
DEPRECATED HCO3 PLAS-SCNC: 27 MMOL/L (ref 22–29)
EOSINOPHIL # BLD AUTO: 0 10E3/UL (ref 0–0.7)
EOSINOPHIL NFR BLD AUTO: 0 %
ERYTHROCYTE [DISTWIDTH] IN BLOOD BY AUTOMATED COUNT: 12.3 % (ref 10–15)
GFR SERPL CREATININE-BSD FRML MDRD: >90 ML/MIN/1.73M2
GLUCOSE SERPL-MCNC: 171 MG/DL (ref 70–99)
HCT VFR BLD AUTO: 30.9 % (ref 40–53)
HGB BLD-MCNC: 10.2 G/DL (ref 13.3–17.7)
IMM GRANULOCYTES # BLD: 0 10E3/UL
IMM GRANULOCYTES NFR BLD: 1 %
LYMPHOCYTES # BLD AUTO: 0.7 10E3/UL (ref 0.8–5.3)
LYMPHOCYTES NFR BLD AUTO: 9 %
MCH RBC QN AUTO: 30.9 PG (ref 26.5–33)
MCHC RBC AUTO-ENTMCNC: 33 G/DL (ref 31.5–36.5)
MCV RBC AUTO: 94 FL (ref 78–100)
MONOCYTES # BLD AUTO: 0.5 10E3/UL (ref 0–1.3)
MONOCYTES NFR BLD AUTO: 7 %
NEUTROPHILS # BLD AUTO: 6.6 10E3/UL (ref 1.6–8.3)
NEUTROPHILS NFR BLD AUTO: 83 %
NRBC # BLD AUTO: 0 10E3/UL
NRBC BLD AUTO-RTO: 0 /100
PLATELET # BLD AUTO: 211 10E3/UL (ref 150–450)
POTASSIUM SERPL-SCNC: 4.4 MMOL/L (ref 3.4–5.3)
PROT SERPL-MCNC: 4.8 G/DL (ref 6.4–8.3)
RBC # BLD AUTO: 3.3 10E6/UL (ref 4.4–5.9)
SODIUM SERPL-SCNC: 139 MMOL/L (ref 136–145)
WBC # BLD AUTO: 7.8 10E3/UL (ref 4–11)

## 2023-08-22 PROCEDURE — 258N000003 HC RX IP 258 OP 636: Performed by: STUDENT IN AN ORGANIZED HEALTH CARE EDUCATION/TRAINING PROGRAM

## 2023-08-22 PROCEDURE — 80053 COMPREHEN METABOLIC PANEL: CPT | Performed by: STUDENT IN AN ORGANIZED HEALTH CARE EDUCATION/TRAINING PROGRAM

## 2023-08-22 PROCEDURE — 36415 COLL VENOUS BLD VENIPUNCTURE: CPT | Performed by: STUDENT IN AN ORGANIZED HEALTH CARE EDUCATION/TRAINING PROGRAM

## 2023-08-22 PROCEDURE — 99239 HOSP IP/OBS DSCHRG MGMT >30: CPT | Performed by: INTERNAL MEDICINE

## 2023-08-22 PROCEDURE — 85025 COMPLETE CBC W/AUTO DIFF WBC: CPT | Performed by: STUDENT IN AN ORGANIZED HEALTH CARE EDUCATION/TRAINING PROGRAM

## 2023-08-22 PROCEDURE — G0378 HOSPITAL OBSERVATION PER HR: HCPCS

## 2023-08-22 PROCEDURE — 250N000011 HC RX IP 250 OP 636: Mod: JZ | Performed by: STUDENT IN AN ORGANIZED HEALTH CARE EDUCATION/TRAINING PROGRAM

## 2023-08-22 PROCEDURE — 258N000001 HC RX 258: Performed by: STUDENT IN AN ORGANIZED HEALTH CARE EDUCATION/TRAINING PROGRAM

## 2023-08-22 PROCEDURE — 96376 TX/PRO/DX INJ SAME DRUG ADON: CPT

## 2023-08-22 PROCEDURE — 250N000013 HC RX MED GY IP 250 OP 250 PS 637: Performed by: INTERNAL MEDICINE

## 2023-08-22 RX ORDER — TROSPIUM CHLORIDE 20 MG/1
20 TABLET, FILM COATED ORAL
Qty: 14 TABLET | Refills: 0 | Status: SHIPPED | OUTPATIENT
Start: 2023-08-22 | End: 2023-08-29

## 2023-08-22 RX ADMIN — TROSPIUM CHLORIDE 20 MG: 20 TABLET, FILM COATED ORAL at 09:14

## 2023-08-22 RX ADMIN — SODIUM CHLORIDE 3000 ML: 900 IRRIGANT IRRIGATION at 02:26

## 2023-08-22 RX ADMIN — SODIUM CHLORIDE: 9 INJECTION, SOLUTION INTRAVENOUS at 06:06

## 2023-08-22 RX ADMIN — TROSPIUM CHLORIDE 20 MG: 20 TABLET, FILM COATED ORAL at 16:21

## 2023-08-22 RX ADMIN — CEFTRIAXONE 2 G: 2 INJECTION, POWDER, FOR SOLUTION INTRAMUSCULAR; INTRAVENOUS at 06:06

## 2023-08-22 ASSESSMENT — ACTIVITIES OF DAILY LIVING (ADL)
ADLS_ACUITY_SCORE: 22

## 2023-08-22 NOTE — ANESTHESIA POSTPROCEDURE EVALUATION
Patient: Roni Rebolledo    Procedure: Procedure(s):  CYSTOSCOPY, WITH FULGURATION OF HEMORRHAGING BLOOD VESSEL AND THROMBUS REMOVAL       Anesthesia Type:  General    Note:  Disposition: Inpatient   Postop Pain Control: Uneventful            Sign Out: Well controlled pain   PONV: No   Neuro/Psych: Uneventful            Sign Out: Acceptable/Baseline neuro status   Airway/Respiratory: Uneventful            Sign Out: Acceptable/Baseline resp. status   CV/Hemodynamics: Uneventful            Sign Out: Acceptable CV status; No obvious hypovolemia; No obvious fluid overload   Other NRE: NONE   DID A NON-ROUTINE EVENT OCCUR? No           Last vitals:  Vitals Value Taken Time   /87 08/21/23 1945   Temp 97.6  F (36.4  C) 08/21/23 1900   Pulse 90 08/21/23 1953   Resp 12 08/21/23 1953   SpO2 89 % 08/21/23 1953   Vitals shown include unvalidated device data.    Electronically Signed By: Burak Swanson MD  August 21, 2023  8:38 PM

## 2023-08-22 NOTE — PLAN OF CARE
To Do:  End of Shift Summary  For vital signs and complete assessments, please see documentation flowsheets.     Pertinent assessments: AxOx4. VSS on RA. CBI clamp trial started. No clots present. CBI output clear; yellow. Pt. to discharge with a Hartman in place. Diet advanced w/ no N/V noted. Pt. passing gas w/ no BM. Denies pain or SOB     Major Shift Events: CBI clamped. Capno removed. Pt. has no pain.     Treatment Plan: pain management, CBI taper    Bedside Nurse: Behzad Mathews RN     Female

## 2023-08-22 NOTE — PROGRESS NOTES
Urology Progress Note    Name: Roni Rebolledo    MRN: [id number]   YOB: 1955  Date of Admission: 8/21/2023              Impression and Plan:   Impression / Plan:   Roni Rebolledo is a 68 year old male with Hx BPH & PAW w gross hematuria s/p fulguration of median lobe of prostate and clot evac. Urine completely clear on slow drip CBI.      -CBI clamped @ 11:30am. Light straw colored fluid collecting in mcgill bag  -If urine reamins clear over the next hour or two, safe to discharge from a urologic perspective with mcgill in place.  -Will arrange for follow up with Dr. Elizabeth in the next 1-2 wk for TOV and further bladder management.  -Flomax daily  -No growth on urine culture.      NIECY De La Torre, CNP  [facility] - Department of Urology  [phone]            Interval History:   Roni Rebolledo is a(n) 68 year old male with a history of gross hematuria and retention of urine; he had been admitted overnight after a failed attempt at self-catheterization resulting in trauma and hematuria.  Several attempts to control the bleeding with CBI had failed and he was planned for procedure for clot evacuation and fulguration yesterday    History is obtained from patient & EMR.          Past Medical History:     Past Medical History:   Diagnosis Date    Anxiety 12/23/2009    Ascending aortic aneurysm 05/06/2015    Benign prostatic hyperplasia     increased PSA    Bladder diverticulum     Dr Duke    Carpal tunnel syndrome on both sides     Hypertension 12/23/2013    Immunization deficiency 05/06/2015    Lipoma 06/2023    retroperitoneal - 8.4 cm    Liver mass 06/2023    2.4 cm    Lumbar radiculopathy 05/17/2016    Onychomycosis 05/17/2016            Past Surgical History:     Past Surgical History:   Procedure Laterality Date    ARTHROSCOPY KNEE WITH LATERAL MENISCECTOMY  2013    left    ARTHROSCOPY SHOULDER      bilateral - rotator cuff repair - 1990's    COLONOSCOPY N/A 05/29/2015    Procedure:  COLONOSCOPY;  Surgeon: Mariano Vigil MD;  Location:  GI    COLONOSCOPY  2019    tubular adenoma - due 5 yrs    ORTHOPEDIC SURGERY  2013    ACL repair left    PROSTATE BIOPSY, NEEDLE, SATURATION SAMPLING      SURGICAL HISTORY OF -       anal fissure    SURGICAL HISTORY OF -   2021    Dr Rojas, prostate artery embolization            Social History:     Social History     Tobacco Use    Smoking status: Former     Packs/day: 0.25     Years: 7.00     Pack years: 1.75     Types: Cigarettes     Quit date: 1/3/1980     Years since quittin.6    Smokeless tobacco: Never   Substance Use Topics    Alcohol use: Yes     Alcohol/week: 3.0 standard drinks of alcohol     Types: 3 Standard drinks or equivalent per week            Family History:     Family History   Problem Relation Age of Onset    Other Cancer Mother         brain CA    Dementia Father         dementia    Coronary Artery Disease Brother     Brain Cancer Daughter          at age 2    Diabetes Paternal Aunt     Colon Cancer No family hx of             Allergies:     Allergies   Allergen Reactions    Lisinopril Cough            Medications:     Current Facility-Administered Medications   Medication    acetaminophen (TYLENOL) tablet 650 mg    Or    acetaminophen (TYLENOL) Suppository 650 mg    cefTRIAXone (ROCEPHIN) 2 g vial to attach to  ml bag for ADULTS or NS 50 ml bag for PEDS    HYDROmorphone (DILAUDID) injection 1 mg    melatonin tablet 1 mg    naloxone (NARCAN) injection 0.2 mg    Or    naloxone (NARCAN) injection 0.4 mg    Or    naloxone (NARCAN) injection 0.2 mg    Or    naloxone (NARCAN) injection 0.4 mg    ondansetron (ZOFRAN ODT) ODT tab 4 mg    Or    ondansetron (ZOFRAN) injection 4 mg    prochlorperazine (COMPAZINE) injection 5 mg    Or    prochlorperazine (COMPAZINE) tablet 5 mg    Or    prochlorperazine (COMPAZINE) suppository 12.5 mg    sodium chloride 0.9% infusion    sodium chloride 0.9% irrigation (bag)    sterile  water (bottle) irrigation    tamsulosin (FLOMAX) capsule 0.4 mg    trospium (SANCTURA) tablet 20 mg             Review of Systems:    ROS: See HPI for pertinent details.  Remainder of 10-point ROS negative.         Physical Exam:   VS:  T: 98    HR: 66    BP: 118/69    RR: 17     GEN:  Alert.  NAD. Pt is not diaphoretic.   HEENT:  Sclerae anicteric.    CV:  No obvious jugular venous distension  LUNGS: No respiratory distress, breathing comfortably wo accessory muscle use.  ABD:  Soft.  NT.  ND.  No rebound or guarding.      : mcgill in place. No penile abnormality.  EXT:  Warm, well perfused.  SKIN:  Warm.  Dry.   NEURO:  CN grossly intact.     URINE: clear light straw colored fluid collecting in mcgill          Data:   All laboratory data reviewed:    Lab Results   Component Value Date    PSA 8.38 11/09/2022    PSA 14.60 06/04/2021    PSA 38.30 05/10/2021    PSA 10.80 07/07/2017    PSA 8.63 05/17/2016    PSA 7.11 11/12/2013    PSA 2.93 01/03/2007     Recent Labs   Lab 08/22/23  0556 08/21/23  1233 08/21/23  0519 08/21/23  0354   WBC 7.8  --   --  8.8   HGB 10.2* 12.8* 13.2* 15.0     --   --  265       Recent Labs   Lab 08/22/23  0556 08/21/23  0359 08/21/23  0354     --  139   POTASSIUM 4.4  --  4.3   CHLORIDE 106  --  102   CO2 27  --  22   BUN 16.1  --  27.6*   CR 0.86 1.0 0.94   *  --  108*   ANA 8.2*  --  9.5       Recent Labs   Lab 08/21/23  0432   COLOR Dark Red*   APPEARANCE Cloudy*   URINEGLC Negative   URINEBILI Negative   URINEKETONE Trace*   SG 1.025   URINEPH 7.0   PROTEIN 600*   NITRITE Negative   LEUKEST Negative   RBCU >182*   WBCU 0     Results for orders placed or performed during the hospital encounter of 08/21/23   Urine Culture    Specimen: Urine, Mcgill Catheter   Result Value Ref Range    Culture No Growth    Results for orders placed or performed in visit on 06/04/21   Urine Culture Aerobic Bacterial    Specimen: Midstream Urine   Result Value Ref Range    Specimen  Description Midstream Urine     Special Requests Specimen received in preservative     Culture Micro No growth

## 2023-08-22 NOTE — PLAN OF CARE
jasony:    Admitting Diagnosis: urinary retention, bladder diverticulum  Pertinent PMH: BPH, HTN, anxiety  Mobility:  assistance, stand-by  Diet: Orders Placed This Encounter      Regular Diet Adult    Consults: Urology  LDAs:  Peripheral and Mcgill  Alarms/Safety: Bed Alarm  Pertinent Labs/Imaging: Hgb 12.8  Isolation Status: No active isolations   Telemetry: No    Expected Discharge Date/Time: 08/22/2023  Transportation: Wife will drive him   Discharge Disposition: Home or Self Care  Current Living Situation: Lives at home with wife    To Do:  End of Shift Summary  For vital signs and complete assessments, please see documentation flowsheets.     Pertinent assessments: A/Ox4. VSS on RA. Pt returned from cystoscopy with fulguration and thrombus removal. 500 ml of clots removed. CBI continued. No clots present. CBI output clear yellow.        Major Shift Events: CBI without complication, decreased pain     Treatment Plan: pain management, CBI taper, reassess CBI clamped,possible discharge today with mcgill

## 2023-08-22 NOTE — DISCHARGE SUMMARY
[facility]  Discharge Summary  Name: Roni Rebolledo    MRN: [id number]  YOB: 1955    Age: 68 year old  Date of Discharge:  8/22/2023  Date of Admission: 8/21/2023  Primary Care Provider: Bernard Ortega  Discharge Physician:  Shady Shukla MD  Discharging Service:  Hospitalist      Hospital Course/Discharge Diagnoses:  Roni Rebolledo is a 68 year old male with past medical history including BPH, bladder diverticuli, hypertension, ascending aortic aneurysm admitted on 8/21/2023 with gross hematuria due to suspected traumatic self-catheterization.  He does have a history of BPH with prostate artery embolism in 2021.  He was seen most recently by urology on 8/10/2023 and underwent cystoscopy which showed trilobar hypertrophy as well as multiple bladder diverticuli and inability to completely empty his bladder.  He presented here on 8/21 after having more difficulty with catheterization needing to use more force and resultant return of multiple clots.     He presented here with normal white blood count, hemoglobin of 15.9 which has gradually trended down and abnormal urine analysis with large blood but negative nitrite and negative leukocyte esterase.  Imaging included a CT scan of his abdomen and pelvis without contrast which showed multiple bladder diverticula with evidence of possible infected bladder diverticula.     He was started on continuous bladder irrigation, IV ceftriaxone and admitted for urologic consultation and possible intervention.    He was taken for cystoscopy on 8/21 with evacuation of a large clot/hematoma which was approximately 500 mL in volume.  He also underwent fulguration of the bleeding vessel.  Following this he has been weaned from continuous bladder irrigation and urine remains clear and light straw-colored.  At this point the plan is for him to discharge home with a Hartman in place and follow-up with urology in 1 to 2 weeks for voiding trial.    Urine culture  has remained negative so we have stopped antibiotics upon discharge.     Problem List/Assessment and Plan:   Gross hematuria in the setting of BPH, incomplete bladder emptying and suspected traumatic self-catheterization: As above.  Status post cystoscopy with clot evacuation and fulguration of bleeding vessel.  --Empirically covered for UTI with ceftriaxone, but with negative culture okay to stop antibiotics upon discharge.  --Remains on Flomax, trospium twice daily as needed  -- Follow-up with urology in 1 to 2 weeks for voiding trial.  -- Discharging with Hartman in place.      2.   History of hypertension: Historical diagnosis but no home medications noted.     3.   History of ascending aortic aneurysm: No apparent acute issues.     4.  Acute blood loss anemia due to urinary bleeding: Bleeding seems to have stopped.        Discharge Disposition:  Discharged to home     Allergies:  Allergies   Allergen Reactions    Lisinopril Cough        Discharge Medications:        Review of your medicines        START taking        Dose / Directions   trospium 20 MG tablet  Commonly known as: SANCTURA  Used for: Bladder diverticulum      Dose: 20 mg  Take 1 tablet (20 mg) by mouth 2 times daily (before meals) for 7 days  Quantity: 14 tablet  Refills: 0            CONTINUE these medicines which have NOT CHANGED        Dose / Directions   Lipo Flavonoid Plus Tabs      Dose: 6 tablet  Take 6 tablets by mouth daily  Refills: 0     tamsulosin 0.4 MG capsule  Commonly known as: FLOMAX  Used for: Benign prostatic hyperplasia with lower urinary tract symptoms, symptom details unspecified      Dose: 0.4 mg  Take 1 capsule (0.4 mg) by mouth daily  Quantity: 90 capsule  Refills: 0               Where to get your medicines        These medications were sent to [facility] [place], [place] - [address] [address]  [address] [facility] [address]      Phone: [phone]   trospium 20 MG tablet         Condition  "on Discharge:  Discharge condition: Stable       Code status on discharge: Full Code     History of Illness:  See detailed admission note for full details.    Physical Exam:  Vital signs:  Temp: 98  F (36.7  C) Temp src: Oral BP: 118/69 Pulse: 66   Resp: 17 SpO2: 97 % O2 Device: None (Room air) Oxygen Delivery: 6 LPM Height: 167.6 cm (5' 6\") Weight: 82.5 kg (181 lb 14.1 oz)  Estimated body mass index is 29.36 kg/m  as calculated from the following:    Height as of this encounter: 1.676 m (5' 6\").    Weight as of this encounter: 82.5 kg (181 lb 14.1 oz).    Wt Readings from Last 1 Encounters:   08/21/23 82.5 kg (181 lb 14.1 oz)     General: Alert, awake, no acute distress.  HEENT: NC/AT, eyes anicteric, external occular movements intact, face symmetric.  Dentition WNL, MM moist.  Cardiac: RRR, S1, S2.  No murmurs appreciated.  Pulmonary: Normal chest rise, normal work of breathing.  Lungs CTA BL  Abdomen: soft, non-tender, non-distended.  Bowel Sounds Present.  No guarding.  Extremities: no deformities.  Warm, well perfused.  Skin: no rashes or lesions noted.  Warm and Dry.  Neuro: No focal deficits noted.  Speech clear.  Coordination and strength grossly normal.  Psych: Appropriate affect.    Procedures other than Imaging:  Cystoscopy with clot evacuation and fulguration of bleeding vessel     Imaging:  Results for orders placed or performed during the hospital encounter of 08/21/23   Abd/pelvis CT no contrast - Stone Protocol    Narrative    EXAM: CT ABDOMEN PELVIS W/O CONTRAST  LOCATION: [facility]  DATE: 8/21/2023    INDICATION: flank pain  COMPARISON: Several 3 2023  TECHNIQUE: CT scan of the abdomen and pelvis was performed without IV contrast. Multiplanar reformats were obtained. Dose reduction techniques were used.  CONTRAST: None.    FINDINGS:   LOWER CHEST: Normal.    HEPATOBILIARY: Normal. Multiple hepatic cysts are better demonstrated on prior contrast-enhanced study.    PANCREAS: " Normal.    SPLEEN: Normal.    ADRENAL GLANDS: Normal.    KIDNEYS/BLADDER: A cyst is again seen in the left kidney measuring 2.5 cm, unchanged and does not require dedicated imaging follow-up. No urinary stones or hydronephrosis. A Hartman catheter is seen within the urinary bladder containing large amount of   high density material compatible with blood product. A bladder diverticulum is again seen arising from the bladder dome with wall thickening and adjacent fat stranding. Additional small diverticula also seen arising from the right bladder wall, also with   mild adjacent fat stranding.    BOWEL: Scattered sigmoid diverticula present without acute diverticulitis. No bowel wall thickening or obstruction. Appendix is normal.    LYMPH NODES: Normal.    VASCULATURE: Unremarkable.    PELVIC ORGANS: Moderate enlargement of the prostate gland redemonstrated, unchanged.    MUSCULOSKELETAL: Multilevel mild and moderate degenerative disc space narrowing seen in the lumbar spine. No suspicious osseous lesions or acute fractures..        Impression    IMPRESSION:     1.  Multiple bladder diverticula, largest arising from the bladder dome. The largest diverticulum is diffusely thick-walled with adjacent fat stranding. Additional smaller diverticula arising from the right bladder wall also demonstrate mild adjacent fat   stranding. Findings are compatible with infected bladder diverticula.    2.  Large amount of blood in the urinary bladder.    3.  Colonic diverticulosis and moderate prostate enlargement.          Consultations:  Consultation during this admission received from urology.       Recent Lab Results:  Recent Labs   Lab 08/22/23  0556 08/21/23  1233 08/21/23  0519 08/21/23  0354   WBC 7.8  --   --  8.8   HGB 10.2* 12.8* 13.2* 15.0   HCT 30.9*  --   --  44.2   MCV 94  --   --  91     --   --  265          Lab Results   Component Value Date     08/22/2023     08/21/2023     06/29/2023      05/10/2021     04/24/2021     07/07/2017    Lab Results   Component Value Date    CHLORIDE 106 08/22/2023    CHLORIDE 102 08/21/2023    CHLORIDE 104 06/29/2023    CHLORIDE 108 11/09/2022    CHLORIDE 105 10/27/2022    CHLORIDE 105 10/27/2022    CHLORIDE 108 05/10/2021    CHLORIDE 105 04/24/2021    CHLORIDE 107 07/07/2017    Lab Results   Component Value Date    BUN 16.1 08/22/2023    BUN 27.6 08/21/2023    BUN 23.0 06/29/2023    BUN 20 11/09/2022    BUN 18 10/27/2022    BUN 18 10/27/2022    BUN 18 05/10/2021    BUN 20 04/24/2021    BUN 24 07/07/2017      Lab Results   Component Value Date    POTASSIUM 4.4 08/22/2023    POTASSIUM 4.3 08/21/2023    POTASSIUM 4.3 06/29/2023    POTASSIUM 4.4 11/09/2022    POTASSIUM 4.1 10/27/2022    POTASSIUM 4.1 10/27/2022    POTASSIUM 4.3 05/10/2021    POTASSIUM 4.1 04/24/2021    POTASSIUM 4.1 07/07/2017    Lab Results   Component Value Date    CO2 27 08/22/2023    CO2 22 08/21/2023    CO2 24 06/29/2023    CO2 26 11/09/2022    CO2 25 10/27/2022    CO2 26 10/27/2022    CO2 25 05/10/2021    CO2 27 04/24/2021    CO2 32 07/07/2017    Lab Results   Component Value Date    CR 0.86 08/22/2023    CR 1.0 08/21/2023    CR 0.94 08/21/2023    CR 0.89 06/29/2023    CR 0.92 05/10/2021    CR 1.04 04/24/2021    CR 0.95 07/07/2017        7-Day Micro Results       Collected Updated Procedure Result Status      08/21/2023 0432 08/22/2023 0754 Urine Culture [[id number]]   Urine, Hartman Catheter    Final result Component Value   Culture No Growth                          Pending Results:    Unresulted Labs Ordered in the Past 30 Days of this Admission       No orders found for last 31 day(s).             Discharge Instructions and Follow-Up:   Discharge Procedure Orders   Reason for your hospital stay   Order Comments: You were admitted for hematuria due to a bleeding blood vessel in your bladder.  You were treated with continuous bladder irrigation and cystoscopy with evacuation of about 500  mL of clot and fulguration of the bleeding vessel.     Follow-up and recommended labs and tests    Order Comments: Follow-up with urology in about 1 week for a voiding trial.     Activity   Order Comments: Your activity upon discharge: Light activity as tolerated     Order Specific Question Answer Comments   Is discharge order? Yes      Tubes and drains   Order Comments: You are going home with the following tubes or drains: mcgill catheter.  Tube cares per hospital or home care instructions     Diet   Order Comments: Follow this diet upon discharge: Orders Placed This Encounter      Regular Diet Adult     Order Specific Question Answer Comments   Is discharge order? Yes        Total time spent in face to face contact with the patient and coordinating discharge was:  50 Minutes.

## 2023-08-22 NOTE — UTILIZATION REVIEW
"  [facility] Utilization Review  Admission Status; Secondary Review Determination     Admission Date: 8/21/2023  3:18 AM      Under the authority of the Utilization Management Committee, the utilization review process indicated a secondary review on the above patient.  The review outcome is based on review of the medical records, discussions with staff, and applying clinical experience noted on the date of the review.        (X) Observation Status Appropriate - This patient does not meet hospital inpatient criteria and is placed in observation status. If this patient's primary payer is Medicare and was admitted as an inpatient, Condition Code 44 should be used and patient status changed to \"observation\".   () Observation Status concurrent Review           RATIONALE FOR DETERMINATION   68-year-old male with history of BPH, bladder diverticuli, hypertension, ascending aortic aneurysm, admitted with gross hematuria due to traumatic self-catheterization with history of BPH and prostate artery embolism in the past.  Hemoglobin slowly trended down, abnormal urinalysis but negative nitrites and leukocyte esterase.  CT abdomen showed multiple bladder diverticula with possible infected bladder diverticula.  Patient was started on CBI, IV ceftriaxone, urology team evaluated patient and status post cystoscopy with clot evacuation and fulguration of bleeding vessel, continued on Flomax, with Hartman catheter in place and needs outpatient follow-up, urine cultures negative and discontinued antibiotics, patient is ready for discharge, with single overnight stay and quick turnaround, does not meet criteria for inpatient, recommend change to observation status, communicated to       The severity of illness, intensity of service provided, expected LOS make the care appropriate for observation status at this time.        The information on this document is developed by the utilization review team in order " for the business office to ensure compliance.  This only denotes the appropriateness of proper admission status and does not reflect the quality of care rendered.         The definitions of Inpatient Status and Observation Status used in making the determination above are those provided in the CMS Coverage Manual, Chapter 1 and Chapter 6, section 70.4.      Sincerely,       Santi Silva MD  Physician Advisor  Utilization Review-[place]    Phone: [phone]

## 2023-08-24 ENCOUNTER — PATIENT OUTREACH (OUTPATIENT)
Dept: CARE COORDINATION | Facility: CLINIC | Age: 68
End: 2023-08-24
Payer: MEDICARE

## 2023-08-24 ENCOUNTER — TELEPHONE (OUTPATIENT)
Dept: UROLOGY | Facility: CLINIC | Age: 68
End: 2023-08-24

## 2023-08-24 NOTE — PROGRESS NOTES
Clinic Care Coordination Contact  [facility]/Voicemail       Clinical Data: Care Coordinator Outreach  Outreach attempted x 2.  Left message on patient's voicemail with call back information and requested return call.  Plan:  Care Coordinator will do no further outreaches at this time.    Patty DAVE Community Health Worker  Clinic Care Coordination  [facility]  Phone: [phone]

## 2023-08-24 NOTE — TELEPHONE ENCOUNTER
[facility] Call Center    Phone Message    May a detailed message be left on voicemail: yes     Reason for Call: Other: Pt called and stated that he tried to self cath then poked the neck of his bladder and started bleeding and went to the ER and Dr. Duke did emergency surgery on him. Pt now requesting Dr. Duke do a green light surgery on him as Dr. Elizabeth is in [place] and pt needs this done ASAP as his prostate is growing into his bladder. Please call pt back to further discuss, thanks!     Action Taken: Message routed to:  [facility] ([facility] uro    Travel Screening: Not Applicable

## 2023-08-25 ENCOUNTER — TELEPHONE (OUTPATIENT)
Dept: UROLOGY | Facility: CLINIC | Age: 68
End: 2023-08-25

## 2023-08-25 ENCOUNTER — VIRTUAL VISIT (OUTPATIENT)
Dept: UROLOGY | Facility: CLINIC | Age: 68
End: 2023-08-25
Payer: MEDICARE

## 2023-08-25 VITALS — HEIGHT: 66 IN | BODY MASS INDEX: 28.93 KG/M2 | WEIGHT: 180 LBS

## 2023-08-25 DIAGNOSIS — R33.8 BENIGN PROSTATIC HYPERPLASIA WITH URINARY RETENTION: Primary | ICD-10-CM

## 2023-08-25 DIAGNOSIS — N40.1 BENIGN PROSTATIC HYPERPLASIA WITH URINARY RETENTION: Primary | ICD-10-CM

## 2023-08-25 PROCEDURE — 99213 OFFICE O/P EST LOW 20 MIN: CPT | Mod: VID | Performed by: STUDENT IN AN ORGANIZED HEALTH CARE EDUCATION/TRAINING PROGRAM

## 2023-08-25 ASSESSMENT — PAIN SCALES - GENERAL: PAINLEVEL: NO PAIN (0)

## 2023-08-25 NOTE — PROGRESS NOTES
Meet me in my chart          Roni is a 68 year old who is being evaluated via a billable video visit.      How would you like to obtain your AVS? MyChart  If the video visit is dropped, the invitation should be resent by: Text to cell phone: [phone]  Will anyone else be joining your video visit? No        Video-Visit Details    Type of service:  Video Visit   Video Start Time: 4:06 PM  Video End Time:4:20 PM    Originating Location (pt. Location): Home    Distant Location (provider location):  On-site  Platform used for Video Visit: [employer]    HPI:  Roni Rebolledo is a 68 year old male being seen for discussion regarding surgical treatment for his prostate.  Duration of problem: Few years  Previous treatments: Previous history of prostate artery embolization, recently underwent clot evacuation and fulguration of bleeder with me      Reviewed previous notes from Dr. Elizabeth  Currently on a Hartman catheter  Does not want to do intermittent self-catheterization  Wonders if he can come off the Hartman catheter  Wants to know about the best surgical option for him for his prostate  Travels to [place] in September and wants to take care of the prostate issue prior to his travel         Review of Systems:  From intake questionnaire     Skin: negative  Eyes: negative  Ears/Nose/Throat: negative  Respiratory: No shortness of breath, dyspnea on exertion, cough, or hemoptysis  Cardiovascular: No chest pain or palpitations  Gastrointestinal: negative; no nausea/vomiting, constipation or diarrhea  Genitourinary: as per HPI  Musculoskeletal: negative  Neurologic: negative  Psychiatric: negative  Hematologic/Lymphatic/Immunologic: negative  Endocrine: negative         Physical Exam:   This is a virtual visit    Alert, no acute distress, oriented, conversant    Ears/nose/mouth: mouth:normal, good dentition  Respiratory: no respiratory distress, or pursed lip breathing  Cardiovascular:no obvious jugular venous distension  present  Skin: no suspicious lesions or rashes on Visible body parts on the Screen  Neuro: Alert, oriented, speech and mentation normal  Psych: affect and mood normal, alert and oriented to person, place and time  Review of Imaging:  The following imaging exams were independently viewed and interpreted by me and discussed with patient:  CT Scan Abd/Pelvis: Abnormal: I personally examined his CT results from his last admission and mass of the prostate at approximately 150 g in size  Prostate 2017: Prostate measured at 140 g, no evidence of PI-RADS 3 or 4 5 lesions  Review of Labs:  The following labs were reviewed by me and discussed with the patient:  PSA: 8.38    Assessment & Plan     Benign prostatic hyperplasia with urinary retention  Discussed with him about the size of his prostate and the fact that he might do better with a HoLEP as an outlet procedure  He has never had a prostate biopsy based on my evaluation on his chart prior to his MRI prostate in 2019 was PI-RADS 2  His PSA has remained stable at 8 the last time it was checked  Based on the size of the gland I think the best approach for him to would have a HoLEP  I referred him to one of my partners to discuss about HoLEP and its approach.        Hilario Duke MD  [facility]      ==========================    Additional Billing and Coding Information:  Review of external notes as documented above   Review of the result(s) of each unique test - PSA, MRI prostate,     Independent interpretation of a test performed by another physician/other qualified health care professional (not separately reported) -   CT abdomen pelvis was evaluated to measure the prostate size    Discussion of management or test interpretation with external physician/other qualified healthcare professional/appropriate source -         15 minutes spent by me on the date of the encounter doing chart review, review of test results, interpretation of tests,  patient visit, and documentation

## 2023-08-25 NOTE — PATIENT INSTRUCTIONS
Patient needs to have a HoLEP for his prostate  To be scheduled with Dr. Membreno/Dr. Wooten/Dr. Lopez

## 2023-08-25 NOTE — LETTER
8/25/2023       RE: Roni Rebolledo  [address]  [facility] [address]     Dear Colleague,    Thank you for referring your patient, Roni Rebolledo, to the [facility] at [facility]. Please see a copy of my visit note below.    Meet me in my chart          Roni is a 68 year old who is being evaluated via a billable video visit.      How would you like to obtain your AVS? MyChart  If the video visit is dropped, the invitation should be resent by: Text to cell phone: [phone]  Will anyone else be joining your video visit? No        Video-Visit Details    Type of service:  Video Visit   Video Start Time: 4:06 PM  Video End Time:4:20 PM    Originating Location (pt. Location): Home    Distant Location (provider location):  On-site  Platform used for Video Visit: [employer]    HPI:  Roni Rebolledo is a 68 year old male being seen for discussion regarding surgical treatment for his prostate.  Duration of problem: Few years  Previous treatments: Previous history of prostate artery embolization, recently underwent clot evacuation and fulguration of bleeder with me      Reviewed previous notes from Dr. Elizabeth  Currently on a Hartman catheter  Does not want to do intermittent self-catheterization  Wonders if he can come off the Hartmna catheter  Wants to know about the best surgical option for him for his prostate  Travels to [place] in September and wants to take care of the prostate issue prior to his travel         Review of Systems:  From intake questionnaire     Skin: negative  Eyes: negative  Ears/Nose/Throat: negative  Respiratory: No shortness of breath, dyspnea on exertion, cough, or hemoptysis  Cardiovascular: No chest pain or palpitations  Gastrointestinal: negative; no nausea/vomiting, constipation or diarrhea  Genitourinary: as per HPI  Musculoskeletal: negative  Neurologic: negative  Psychiatric:  negative  Hematologic/Lymphatic/Immunologic: negative  Endocrine: negative         Physical Exam:   This is a virtual visit    Alert, no acute distress, oriented, conversant    Ears/nose/mouth: mouth:normal, good dentition  Respiratory: no respiratory distress, or pursed lip breathing  Cardiovascular:no obvious jugular venous distension present  Skin: no suspicious lesions or rashes on Visible body parts on the Screen  Neuro: Alert, oriented, speech and mentation normal  Psych: affect and mood normal, alert and oriented to person, place and time  Review of Imaging:  The following imaging exams were independently viewed and interpreted by me and discussed with patient:  CT Scan Abd/Pelvis: Abnormal: I personally examined his CT results from his last admission and mass of the prostate at approximately 150 g in size  Prostate 2017: Prostate measured at 140 g, no evidence of PI-RADS 3 or 4 5 lesions  Review of Labs:  The following labs were reviewed by me and discussed with the patient:  PSA: 8.38    Assessment & Plan    Benign prostatic hyperplasia with urinary retention  Discussed with him about the size of his prostate and the fact that he might do better with a HoLEP as an outlet procedure  He has never had a prostate biopsy based on my evaluation on his chart prior to his MRI prostate in 2019 was PI-RADS 2  His PSA has remained stable at 8 the last time it was checked  Based on the size of the gland I think the best approach for him to would have a HoLEP  I referred him to one of my partners to discuss about HoLEP and its approach.        Hilario Duke MD  [facility]      ==========================    Additional Billing and Coding Information:  Review of external notes as documented above   Review of the result(s) of each unique test - PSA, MRI prostate,     Independent interpretation of a test performed by another physician/other qualified health care professional (not separately  reported) -   CT abdomen pelvis was evaluated to measure the prostate size    Discussion of management or test interpretation with external physician/other qualified healthcare professional/appropriate source -         15 minutes spent by me on the date of the encounter doing chart review, review of test results, interpretation of tests, patient visit, and documentation

## 2023-08-25 NOTE — TELEPHONE ENCOUNTER
Pt called and stated he has been constipated since Sunday before his urologic surgery. Pt reported that he has had a couple of small hard pebble size stools and that has been all. He reported he is taking stool softeners daily. He is asking if he can do an enema or what he should do as he is becoming uncomfortable. Discussed with patient using some miralax, prune juice, and the stool softeners and if he wanted he could try an enema. Advised pt to sit on the toilet and put his feet up on a stool as well to help try to have a bowel movement. Pt agreed with plan and will call if he has questions.  Kasey ROCHA RN BSN PHN  Specialty Clinics

## 2023-08-28 NOTE — TELEPHONE ENCOUNTER
MEDICAL RECORDS REQUEST   [facility]  Urology Clinic  [address] [place] [address]  PHONE: [phone]  Fax: [phone]        FUTURE VISIT INFORMATION                                                   Roni Rebolledo, : 1955 scheduled for future visit at [facility]    APPOINTMENT INFORMATION:  Date: 2023  Provider:  Mikael Membreno MD  Reason for Visit/Diagnosis: holep discuss    REFERRAL INFORMATION:  Referring provider:  Bernard Ortega MD in  FAMILY PRACTICE      RECORDS REQUESTED FOR VISIT                                                     NOTES  STATUS/DETAILS   OFFICE NOTE from referring provider  yes, Bernard Ortega MD in  FAMILY PRACTICE   OFFICE NOTE from other specialist  yes, 2023 -- VALARIE @ [facility]  08/10/2023, 2023 -- [facility]  10/23/2019 -- Brittany Pruett MD @ [facility]    MORE   MEDICATION LIST  yes   LABS     URINALYSIS (UA)  yes   BENIGN PROSTATIC HYPERPLASIA (BPH)     IMAGES  yes, 2023, 2023 -- CT ABD PELVIS  2023 -- MR ABD       PSA  yes     PRE-VISIT CHECKLIST      Joint diagnostic appointment coordinated correctly          (ensure right order & amount of time) Yes   RECORD COLLECTION COMPLETE Yes

## 2023-08-29 ENCOUNTER — PRE VISIT (OUTPATIENT)
Dept: UROLOGY | Facility: CLINIC | Age: 68
End: 2023-08-29

## 2023-08-29 ENCOUNTER — TELEPHONE (OUTPATIENT)
Dept: UROLOGY | Facility: CLINIC | Age: 68
End: 2023-08-29

## 2023-08-29 ENCOUNTER — VIRTUAL VISIT (OUTPATIENT)
Dept: UROLOGY | Facility: CLINIC | Age: 68
End: 2023-08-29
Payer: MEDICARE

## 2023-08-29 VITALS — WEIGHT: 181 LBS | HEIGHT: 66 IN | BODY MASS INDEX: 29.09 KG/M2

## 2023-08-29 DIAGNOSIS — R33.9 URINARY RETENTION: Primary | ICD-10-CM

## 2023-08-29 DIAGNOSIS — N39.0 UTI (URINARY TRACT INFECTION): ICD-10-CM

## 2023-08-29 PROCEDURE — 99215 OFFICE O/P EST HI 40 MIN: CPT | Mod: VID | Performed by: UROLOGY

## 2023-08-29 ASSESSMENT — PAIN SCALES - GENERAL: PAINLEVEL: MILD PAIN (2)

## 2023-08-29 NOTE — PROGRESS NOTES
UROLOGY OUTPATIENT VISIT      Chief Complaint:   Enlarged prostate, urinary retention      Synopsis    Roni Rebolledo is a very pleasant AGE: 68 year old year old person    He has a history of a very large prostate estimated around 140 g.  He underwent prostate artery embolization a couple of years ago which had been doing okay but recently went into urinary retention with associated clot formation.  He underwent cystoscopy with clot evacuation a week ago with my colleague Dr. Duke.  He has a known history of several bladder diverticuli the largest being about 6 cm at the dome of the bladder.  He also has a history of an elevated PSA though it has been stable.    Currently he has an indwelling catheter, which he is tolerating poorly.  He is hoping to expedite treatment so that he can go to [place] in the coming weeks.    He did undergo recent cystoscopy August 10    Cystoscopy: after informed consent was obtained, the patient was prepped and draped in standard sterile fashion. The flexible cystoscope was introduced into the patient's urethra without difficulty. There were no strictures in the urethra. Upon entering the bladder, the UOs were orthotopic and effluxing clear urine. There were several diverticulae noted, without tumors.  There was a significant median lobe noted on retroflexion, and significant lateral lobe hypertrophy.     Void/pvr: 232/476 cc         Medications     Current Outpatient Medications   Medication    tamsulosin (FLOMAX) 0.4 MG capsule    trospium (SANCTURA) 20 MG tablet    Vitamins-Lipotropics (LIPO FLAVONOID PLUS) TABS     No current facility-administered medications for this visit.         The following  distinct labs were reviewed    I personally reviewed all applicable laboratory data and went over findings with patient  Significant for:    CBC RESULTS:  Recent Labs   Lab Test 08/22/23  0556 08/21/23  1233 08/21/23  0519 08/21/23  0354 06/29/23  1034 11/09/22  0757   WBC 7.8  --    --  8.8 4.9 4.8   HGB 10.2* 12.8* 13.2* 15.0 15.2 15.9     --   --  265 212 236        BMP RESULTS:  Recent Labs   Lab Test 08/22/23  0556 08/21/23  0359 08/21/23  0354 06/29/23  1034 11/09/22  0757 10/27/22  1414 05/10/21  1039 04/24/21  1841 07/07/17  1459 05/17/16  0847     --  139 139 140   < > 139 138 143 141   POTASSIUM 4.4  --  4.3 4.3 4.4   < > 4.3 4.1 4.1 4.5   CHLORIDE 106  --  102 104 108   < > 108 105 107 107   CO2 27  --  22 24 26   < > 25 27 32 28   ANIONGAP 6*  --  15 11 6   < > 6 6 4 6   *  --  108* 95 91   < > 95 98 109* 103*   BUN 16.1  --  27.6* 23.0 20   < > 18 20 24 22   CR 0.86 1.0 0.94 0.89 0.91   < > 0.92 1.04 0.95 0.82   GFRESTIMATED >90 >60 88 >90 >90   < > 85 74 80 >90  Non  GFR Calc     GFRESTBLACK  --   --   --   --   --   --  >90 86 >90   GFR Calc   >90   GFR Calc      < > = values in this interval not displayed.       CALCIUM RESULTS:  Recent Labs   Lab Test 08/22/23  0556 08/21/23  0354 06/29/23  1034 11/09/22  0757   ANA 8.2* 9.5 9.0 9.2       HGB A1C RESULTS:  Lab Results   Component Value Date    A1C 5.8 05/06/2015    A1C 5.6 11/21/2002       UA RESULTS:   Recent Labs   Lab Test 08/21/23  0432 06/29/23  1044 11/09/22  0819   SG 1.025 1.020 1.025   URINEPH 7.0 6.0 6.0   NITRITE Negative Negative Negative   RBCU >182* 5-10* 2-5*   WBCU 0 0-5 0-5       PSA RESULTS  PSA   Date Value Ref Range Status   06/04/2021 14.60 (H) 0 - 4 ug/L Final     Comment:     Assay Method:  Chemiluminescence using Siemens Vista analyzer   05/10/2021 38.30 (H) 0 - 4 ug/L Final     Comment:     Assay Method:  Chemiluminescence using Siemens Vista analyzer   07/07/2017 10.80 (H) 0 - 4 ug/L Final     Comment:     Assay Method:  Chemiluminescence using Siemens Vista analyzer   05/17/2016 8.63 (H) 0 - 4 ug/L Final   11/12/2013 7.11 (H) 0 - 4 ug/L Final   01/03/2007 2.93 0 - 4 ug/L Final     Prostate Specific Antigen Screen   Date Value Ref  Range Status   11/09/2022 8.38 (H) 0.00 - 4.00 ug/L Final         Recent Imaging Report    I personally reviewed all applicable imaging and went over the below findings with patient.    Results for orders placed or performed during the hospital encounter of 08/21/23   Abd/pelvis CT no contrast - Stone Protocol    Narrative    EXAM: CT ABDOMEN PELVIS W/O CONTRAST  LOCATION: [facility]  DATE: 8/21/2023    INDICATION: flank pain  COMPARISON: Several 3 2023  TECHNIQUE: CT scan of the abdomen and pelvis was performed without IV contrast. Multiplanar reformats were obtained. Dose reduction techniques were used.  CONTRAST: None.    FINDINGS:   LOWER CHEST: Normal.    HEPATOBILIARY: Normal. Multiple hepatic cysts are better demonstrated on prior contrast-enhanced study.    PANCREAS: Normal.    SPLEEN: Normal.    ADRENAL GLANDS: Normal.    KIDNEYS/BLADDER: A cyst is again seen in the left kidney measuring 2.5 cm, unchanged and does not require dedicated imaging follow-up. No urinary stones or hydronephrosis. A Hartman catheter is seen within the urinary bladder containing large amount of   high density material compatible with blood product. A bladder diverticulum is again seen arising from the bladder dome with wall thickening and adjacent fat stranding. Additional small diverticula also seen arising from the right bladder wall, also with   mild adjacent fat stranding.    BOWEL: Scattered sigmoid diverticula present without acute diverticulitis. No bowel wall thickening or obstruction. Appendix is normal.    LYMPH NODES: Normal.    VASCULATURE: Unremarkable.    PELVIC ORGANS: Moderate enlargement of the prostate gland redemonstrated, unchanged.    MUSCULOSKELETAL: Multilevel mild and moderate degenerative disc space narrowing seen in the lumbar spine. No suspicious osseous lesions or acute fractures..        Impression    IMPRESSION:     1.  Multiple bladder diverticula, largest arising from the bladder  dome. The largest diverticulum is diffusely thick-walled with adjacent fat stranding. Additional smaller diverticula arising from the right bladder wall also demonstrate mild adjacent fat   stranding. Findings are compatible with infected bladder diverticula.    2.  Large amount of blood in the urinary bladder.    3.  Colonic diverticulosis and moderate prostate enlargement.         Personal Review of Recent Imaging    I personally reviewed the above CT and based on my own interpretation prostate does appear to have a very large intravesical component.  I do also appreciate a large bladder diverticulum at the dome of the bladder           Assessment/Plan   68 year old year old person with large prostate, urinary retention, bladder diverticulum  -We discussed the various available treatment options with an emphasis on prostate artery embolization, laser enucleation, simple prostatectomy.  We discussed the risks, benefits, alternatives to each.  We discussed some of the medical literature about treating a large bladder diverticulum in the setting of a large prostate and discussed the possibility of doing a combined procedure with removing both the prostate obstruction and bladder diverticulum simultaneously.  He is aware that treatment of the outlet alone may allow him to void but that the bladder diverticulum could be a source of infection or incomplete emptying further down the road and may need another procedure.  At this point his goal is mainly just to get the catheter out and he is amenable to whichever option could be done most expeditiously.      In terms of the Holep we discussed the associated risks of this procedure included but not limited to the following:  -Bleeding, potentially significant enough to require clot evacuation and blood transfusion  -Infection, for which we will plan to treat preoperatively based on targeted antibiotic therapy  -Damage to the bladder, urethra and penis including the risk of  urethral stricture and bladder neck contracture  -Risk of incidentally discovered prostate cancer.  We discussed that this would not preclude him from further therapy though it could prolong recovery and potentially increase risk of complications associated with cancer treatment.  Further preoperative workup to assess for prostate cancer prior to surgery was offered but patient deferred.  -Risk of retrograde ejaculation which would be expected to occur in the majority if not all men after HoLEP  -Risk of urinary incontinence.  We discussed that in the majority of men this is a transient process that is generally self limited to the first 6-12 weeks after surgery though could take longer to resolve depending on baseline bladder instability.  -Risk of postperative urinary retention though in published series HoLEP has been found to be associated with high success rates of achieving spontaneous voiding even in men with underactive and atonic bladders.  -We will proceed with preoperative clearance with preference to minimize all anticoagulation as deemed acceptable by his primary care provider.     CC:  Bernard Ortega    50  minutes spent on clinical encounter including  Review of medical records  Review of outside records  Documentation  Coordinating follow-up medical care              Virtual Visit Details    Type of service:  Video Visit   Video Start Time:  8:45  Video End Time: 9:15    Originating Location (pt. Location): Home    Distant Location (provider location):  On-site  Platform used for Video Visit: Nannette

## 2023-08-29 NOTE — NURSING NOTE
Is the patient currently in the state of [place]? YES    Visit mode:VIDEO    If the visit is dropped, the patient can be reconnected by: VIDEO VISIT: Text to cell phone:   Telephone Information:   Mobile [phone]       Will anyone else be joining the visit? NO  (If patient encounters technical issues they should call [phone]    How would you like to obtain your AVS? MyChart    Are changes needed to the allergy or medication list? No    Reason for visit: Consult    Katt TESFAYE

## 2023-08-29 NOTE — LETTER
8/29/2023       RE: Roni Rebolledo  [address]  [facility] [address]     Dear Colleague,    Thank you for referring your patient, Roni Rebolledo, to the [facility] [place] [facility]. Please see a copy of my visit note below.          UROLOGY OUTPATIENT VISIT      Chief Complaint:   Enlarged prostate, urinary retention      Synopsis    Roni Rebolledo is a very pleasant AGE: 68 year old year old person    He has a history of a very large prostate estimated around 140 g.  He underwent prostate artery embolization a couple of years ago which had been doing okay but recently went into urinary retention with associated clot formation.  He underwent cystoscopy with clot evacuation a week ago with my colleague Dr. Duke.  He has a known history of several bladder diverticuli the largest being about 6 cm at the dome of the bladder.  He also has a history of an elevated PSA though it has been stable.    Currently he has an indwelling catheter, which he is tolerating poorly.  He is hoping to expedite treatment so that he can go to [place] in the coming weeks.    He did undergo recent cystoscopy August 10    Cystoscopy: after informed consent was obtained, the patient was prepped and draped in standard sterile fashion. The flexible cystoscope was introduced into the patient's urethra without difficulty. There were no strictures in the urethra. Upon entering the bladder, the UOs were orthotopic and effluxing clear urine. There were several diverticulae noted, without tumors.  There was a significant median lobe noted on retroflexion, and significant lateral lobe hypertrophy.     Void/pvr: 232/476 cc         Medications     Current Outpatient Medications   Medication    tamsulosin (FLOMAX) 0.4 MG capsule    trospium (SANCTURA) 20 MG tablet    Vitamins-Lipotropics (LIPO FLAVONOID PLUS) TABS     No current facility-administered  medications for this visit.         The following  distinct labs were reviewed    I personally reviewed all applicable laboratory data and went over findings with patient  Significant for:    CBC RESULTS:  Recent Labs   Lab Test 08/22/23  0556 08/21/23  1233 08/21/23  0519 08/21/23  0354 06/29/23  1034 11/09/22  0757   WBC 7.8  --   --  8.8 4.9 4.8   HGB 10.2* 12.8* 13.2* 15.0 15.2 15.9     --   --  265 212 236        BMP RESULTS:  Recent Labs   Lab Test 08/22/23  0556 08/21/23  0359 08/21/23  0354 06/29/23  1034 11/09/22  0757 10/27/22  1414 05/10/21  1039 04/24/21  1841 07/07/17  1459 05/17/16  0847     --  139 139 140   < > 139 138 143 141   POTASSIUM 4.4  --  4.3 4.3 4.4   < > 4.3 4.1 4.1 4.5   CHLORIDE 106  --  102 104 108   < > 108 105 107 107   CO2 27  --  22 24 26   < > 25 27 32 28   ANIONGAP 6*  --  15 11 6   < > 6 6 4 6   *  --  108* 95 91   < > 95 98 109* 103*   BUN 16.1  --  27.6* 23.0 20   < > 18 20 24 22   CR 0.86 1.0 0.94 0.89 0.91   < > 0.92 1.04 0.95 0.82   GFRESTIMATED >90 >60 88 >90 >90   < > 85 74 80 >90  Non  GFR Calc     GFRESTBLACK  --   --   --   --   --   --  >90 86 >90   GFR Calc   >90   GFR Calc      < > = values in this interval not displayed.       CALCIUM RESULTS:  Recent Labs   Lab Test 08/22/23  0556 08/21/23  0354 06/29/23  1034 11/09/22  0757   ANA 8.2* 9.5 9.0 9.2       HGB A1C RESULTS:  Lab Results   Component Value Date    A1C 5.8 05/06/2015    A1C 5.6 11/21/2002       UA RESULTS:   Recent Labs   Lab Test 08/21/23  0432 06/29/23  1044 11/09/22  0819   SG 1.025 1.020 1.025   URINEPH 7.0 6.0 6.0   NITRITE Negative Negative Negative   RBCU >182* 5-10* 2-5*   WBCU 0 0-5 0-5       PSA RESULTS  PSA   Date Value Ref Range Status   06/04/2021 14.60 (H) 0 - 4 ug/L Final     Comment:     Assay Method:  Chemiluminescence using Siemens Vista analyzer   05/10/2021 38.30 (H) 0 - 4 ug/L Final     Comment:     Assay Method:   Chemiluminescence using Siemens Vista analyzer   07/07/2017 10.80 (H) 0 - 4 ug/L Final     Comment:     Assay Method:  Chemiluminescence using Siemens Vista analyzer   05/17/2016 8.63 (H) 0 - 4 ug/L Final   11/12/2013 7.11 (H) 0 - 4 ug/L Final   01/03/2007 2.93 0 - 4 ug/L Final     Prostate Specific Antigen Screen   Date Value Ref Range Status   11/09/2022 8.38 (H) 0.00 - 4.00 ug/L Final         Recent Imaging Report    I personally reviewed all applicable imaging and went over the below findings with patient.    Results for orders placed or performed during the hospital encounter of 08/21/23   Abd/pelvis CT no contrast - Stone Protocol    Narrative    EXAM: CT ABDOMEN PELVIS W/O CONTRAST  LOCATION: [facility]  DATE: 8/21/2023    INDICATION: flank pain  COMPARISON: Several 3 2023  TECHNIQUE: CT scan of the abdomen and pelvis was performed without IV contrast. Multiplanar reformats were obtained. Dose reduction techniques were used.  CONTRAST: None.    FINDINGS:   LOWER CHEST: Normal.    HEPATOBILIARY: Normal. Multiple hepatic cysts are better demonstrated on prior contrast-enhanced study.    PANCREAS: Normal.    SPLEEN: Normal.    ADRENAL GLANDS: Normal.    KIDNEYS/BLADDER: A cyst is again seen in the left kidney measuring 2.5 cm, unchanged and does not require dedicated imaging follow-up. No urinary stones or hydronephrosis. A Hartman catheter is seen within the urinary bladder containing large amount of   high density material compatible with blood product. A bladder diverticulum is again seen arising from the bladder dome with wall thickening and adjacent fat stranding. Additional small diverticula also seen arising from the right bladder wall, also with   mild adjacent fat stranding.    BOWEL: Scattered sigmoid diverticula present without acute diverticulitis. No bowel wall thickening or obstruction. Appendix is normal.    LYMPH NODES: Normal.    VASCULATURE: Unremarkable.    PELVIC ORGANS:  Moderate enlargement of the prostate gland redemonstrated, unchanged.    MUSCULOSKELETAL: Multilevel mild and moderate degenerative disc space narrowing seen in the lumbar spine. No suspicious osseous lesions or acute fractures..        Impression    IMPRESSION:     1.  Multiple bladder diverticula, largest arising from the bladder dome. The largest diverticulum is diffusely thick-walled with adjacent fat stranding. Additional smaller diverticula arising from the right bladder wall also demonstrate mild adjacent fat   stranding. Findings are compatible with infected bladder diverticula.    2.  Large amount of blood in the urinary bladder.    3.  Colonic diverticulosis and moderate prostate enlargement.         Personal Review of Recent Imaging    I personally reviewed the above CT and based on my own interpretation prostate does appear to have a very large intravesical component.  I do also appreciate a large bladder diverticulum at the dome of the bladder           Assessment/Plan   68 year old year old person with large prostate, urinary retention, bladder diverticulum  -We discussed the various available treatment options with an emphasis on prostate artery embolization, laser enucleation, simple prostatectomy.  We discussed the risks, benefits, alternatives to each.  We discussed some of the medical literature about treating a large bladder diverticulum in the setting of a large prostate and discussed the possibility of doing a combined procedure with removing both the prostate obstruction and bladder diverticulum simultaneously.  He is aware that treatment of the outlet alone may allow him to void but that the bladder diverticulum could be a source of infection or incomplete emptying further down the road and may need another procedure.  At this point his goal is mainly just to get the catheter out and he is amenable to whichever option could be done most expeditiously.      In terms of the Holep we discussed  the associated risks of this procedure included but not limited to the following:  -Bleeding, potentially significant enough to require clot evacuation and blood transfusion  -Infection, for which we will plan to treat preoperatively based on targeted antibiotic therapy  -Damage to the bladder, urethra and penis including the risk of urethral stricture and bladder neck contracture  -Risk of incidentally discovered prostate cancer.  We discussed that this would not preclude him from further therapy though it could prolong recovery and potentially increase risk of complications associated with cancer treatment.  Further preoperative workup to assess for prostate cancer prior to surgery was offered but patient deferred.  -Risk of retrograde ejaculation which would be expected to occur in the majority if not all men after HoLEP  -Risk of urinary incontinence.  We discussed that in the majority of men this is a transient process that is generally self limited to the first 6-12 weeks after surgery though could take longer to resolve depending on baseline bladder instability.  -Risk of postperative urinary retention though in published series HoLEP has been found to be associated with high success rates of achieving spontaneous voiding even in men with underactive and atonic bladders.  -We will proceed with preoperative clearance with preference to minimize all anticoagulation as deemed acceptable by his primary care provider.     CC:  Bernard Ortega    50  minutes spent on clinical encounter including  Review of medical records  Review of outside records  Documentation  Coordinating follow-up medical care        Virtual Visit Details    Type of service:  Video Visit   Video Start Time:  8:45  Video End Time: 9:15    Originating Location (pt. Location): Home    Distant Location (provider location):  On-site  Platform used for Video Visit: Nannette      Sincerely,    Mikael Membreno MD

## 2023-08-29 NOTE — TELEPHONE ENCOUNTER
FUTURE VISIT INFORMATION      SURGERY INFORMATION:  Date: 9/6/22  Location: [facility]  Surgeon:  Aris Wooten MD   Anesthesia Type:  choice  Procedure: Holmium Laser Enucleation of the Prostate     RECORDS REQUESTED FROM:       Primary Care Provider: [facility]    Pertinent Medical History: hypertension, aneurysm of ascending aorta without rupture    Most recent EKG+ Tracing: 10/27/22    Most recent ECHO: 11/10/14    Most recent Cardiac Stress Test: 5/7/21

## 2023-08-29 NOTE — TELEPHONE ENCOUNTER
Spoke with: Patient       Date of surgery: Wednesday Sept 6 2023      Location: [place]       Informed patient they will need a adult : YES      Pre op with provider: CHRISTINE      H&P Scheduled in PAC-  Patient is scheduled for a in person PAC EVAL on 8/30           Pre procedure covid : NA      Additional imaging: NA        Surgery Packet : Sent via G3       Additional comments: Please call for surgery teaching

## 2023-08-30 ENCOUNTER — PRE VISIT (OUTPATIENT)
Dept: SURGERY | Facility: CLINIC | Age: 68
End: 2023-08-30

## 2023-08-30 LAB
ABO/RH(D): NORMAL
ANTIBODY SCREEN: NEGATIVE
SPECIMEN EXPIRATION DATE: NORMAL

## 2023-08-31 ENCOUNTER — OFFICE VISIT (OUTPATIENT)
Dept: SURGERY | Facility: CLINIC | Age: 68
End: 2023-08-31
Payer: MEDICARE

## 2023-08-31 ENCOUNTER — ANESTHESIA EVENT (OUTPATIENT)
Dept: SURGERY | Facility: CLINIC | Age: 68
End: 2023-08-31
Payer: MEDICARE

## 2023-08-31 ENCOUNTER — LAB (OUTPATIENT)
Dept: LAB | Facility: CLINIC | Age: 68
End: 2023-08-31
Payer: MEDICARE

## 2023-08-31 ENCOUNTER — PRE VISIT (OUTPATIENT)
Dept: SURGERY | Facility: CLINIC | Age: 68
End: 2023-08-31

## 2023-08-31 ENCOUNTER — HOSPITAL ENCOUNTER (OUTPATIENT)
Facility: CLINIC | Age: 68
Discharge: HOME | End: 2023-08-31
Attending: PHYSICIAN ASSISTANT | Admitting: FAMILY MEDICINE
Payer: MEDICARE

## 2023-08-31 VITALS
OXYGEN SATURATION: 97 % | SYSTOLIC BLOOD PRESSURE: 143 MMHG | HEART RATE: 74 BPM | WEIGHT: 179.6 LBS | TEMPERATURE: 98 F | HEIGHT: 66 IN | BODY MASS INDEX: 28.87 KG/M2 | RESPIRATION RATE: 16 BRPM | DIASTOLIC BLOOD PRESSURE: 83 MMHG

## 2023-08-31 DIAGNOSIS — R33.9 URINARY RETENTION: ICD-10-CM

## 2023-08-31 DIAGNOSIS — Z01.818 PRE-OP EVALUATION: Primary | ICD-10-CM

## 2023-08-31 DIAGNOSIS — Z01.818 PRE-OP EVALUATION: ICD-10-CM

## 2023-08-31 LAB — HGB BLD-MCNC: 12.5 G/DL (ref 13.3–17.7)

## 2023-08-31 PROCEDURE — 85018 HEMOGLOBIN: CPT | Performed by: PATHOLOGY

## 2023-08-31 PROCEDURE — 86850 RBC ANTIBODY SCREEN: CPT

## 2023-08-31 PROCEDURE — 99204 OFFICE O/P NEW MOD 45 MIN: CPT | Performed by: PHYSICIAN ASSISTANT

## 2023-08-31 PROCEDURE — 36415 COLL VENOUS BLD VENIPUNCTURE: CPT | Performed by: PATHOLOGY

## 2023-08-31 PROCEDURE — 86901 BLOOD TYPING SEROLOGIC RH(D): CPT

## 2023-08-31 ASSESSMENT — PAIN SCALES - GENERAL: PAINLEVEL: NO PAIN (0)

## 2023-08-31 ASSESSMENT — ENCOUNTER SYMPTOMS: SEIZURES: 0

## 2023-08-31 ASSESSMENT — LIFESTYLE VARIABLES: TOBACCO_USE: 1

## 2023-08-31 NOTE — H&P (VIEW-ONLY)
Pre-Operative H & P     CC:  Preoperative exam to assess for increased cardiopulmonary risk while undergoing surgery and anesthesia.    Date of Encounter: 8/31/2023  Primary Care Physician:  Bernard Ortega     Reason for visit:   Encounter Diagnoses   Name Primary?    Pre-op evaluation Yes    Urinary retention        HPI  Roni Rebolledo is a 68 year old male who presents for pre-operative H & P in preparation for  Procedure Information       Case: [id number] Date/Time: 09/06/23 1225    Procedure: Holmium Laser Enucleation of the Prostate (Urethra)    Anesthesia type: Choice    Diagnosis: Urinary retention [R33.9]    Pre-op diagnosis: Urinary retention [R33.9]    Location: [facility]    Providers: Aris Wooten MD            Patient is being evaluated for comorbid conditions of HTN, ascending aortic aneurysm, acute blood loss anemia, cervical and lumbar radiculopathy, and anxiety.    The patient has a history of urinary retention with associated clot formation secondary to BPH, requiring indwelling urinary catheter. He is s/p prostate artery embolization a couple years ago, which initially helped his symptoms, but he has recently had a recurrence. He has been evaluated by urology and also found to have a large bladder diverticulum. During his most recent visit with Dr. Membreno on 8/29/23, treatment options were discussed and a plan was made to proceed with surgery as scheduled above.    History is obtained from the patient and chart review    Hx of abnormal bleeding or anti-platelet use: None      Past Medical History  Past Medical History:   Diagnosis Date    Anxiety 12/23/2009    Ascending aortic aneurysm 05/06/2015    Benign prostatic hyperplasia     increased PSA    Bladder diverticulum     Dr Duke    Carpal tunnel syndrome on both sides     Hypertension 12/23/2013    Immunization deficiency 05/06/2015    Lipoma 06/2023    retroperitoneal - 8.4 cm    Liver mass 06/2023    2.4 cm    Lumbar radiculopathy  2016    Onychomycosis 2016       Past Surgical History  Past Surgical History:   Procedure Laterality Date    ARTHROSCOPY KNEE WITH LATERAL MENISCECTOMY      left    ARTHROSCOPY SHOULDER      bilateral - rotator cuff repair -     COLONOSCOPY N/A 2015    Procedure: COLONOSCOPY;  Surgeon: Mariano Vigil MD;  Location:  GI    COLONOSCOPY  2019    tubular adenoma - due 5 yrs    CYSTOSCOPY, FULGURATE BLEEDERS, EVACUATE CLOT(S), COMBINED N/A 2023    Procedure: 1. Cystourethroscopy 2. Clot evacuation 3. Fulguration of bleeder;  Surgeon: Hilario Duke MD;  Location:  OR    ORTHOPEDIC SURGERY      ACL repair left    PROSTATE BIOPSY, NEEDLE, SATURATION SAMPLING      SURGICAL HISTORY OF -       anal fissure    SURGICAL HISTORY OF -   2021    Dr Rojas, prostate artery embolization       Prior to Admission Medications  Current Outpatient Medications   Medication Sig Dispense Refill    Vitamins-Lipotropics (LIPO FLAVONOID PLUS) TABS Take 6 tablets by mouth daily         Allergies  Allergies   Allergen Reactions    Lisinopril Cough       Social History  Social History     Socioeconomic History    Marital status:      Spouse name: Kayla    Number of children: 6    Years of education: 12    Highest education level: Not on file   Occupational History     Employer: [employer]   Tobacco Use    Smoking status: Former     Packs/day: 0.25     Years: 7.00     Pack years: 1.75     Types: Cigarettes     Quit date: 1/3/1980     Years since quittin.6    Smokeless tobacco: Never   Vaping Use    Vaping Use: Never used   Substance and Sexual Activity    Alcohol use: Not Currently     Comment: 4 oz of alcohol a day    Drug use: Yes     Types: Marijuana     Comment: Medical marijuana daily    Sexual activity: Not Currently     Partners: Female   Other Topics Concern    Parent/sibling w/ CABG, MI or angioplasty before 65F 55M? No   Social History  Narrative    Not on file     Social Determinants of Health     Financial Resource Strain: Not on file   Food Insecurity: Not on file   Transportation Needs: Not on file   Physical Activity: Not on file   Stress: Not on file   Social Connections: Not on file   Intimate Partner Violence: Not on file   Housing Stability: Not on file       Family History  Family History   Problem Relation Age of Onset    Other Cancer Mother         brain CA    Dementia Father         dementia    Coronary Artery Disease Brother     Brain Cancer Daughter          at age 2    Diabetes Paternal Aunt     Colon Cancer No family hx of     Anesthesia Reaction No family hx of     Venous thrombosis No family hx of        Review of Systems  The complete review of systems is negative other than noted in the HPI or here.   Anesthesia Evaluation   Pt has had prior anesthetic.     No history of anesthetic complications       ROS/MED HX  ENT/Pulmonary:     (+)     SHAWNA risk factors, snores loudly, hypertension,         tobacco use, Past use,                   (-) asthma and recent URI   Neurologic: Comment: Cervical and lumbar radiculopathy    (+)    peripheral neuropathy, - bilateral hands.                        (-) no seizures, no CVA and no TIA   Cardiovascular: Comment: Ascending aortic aneurysm 4.0 cm    (+)  hypertension- -   -  - -                                 Previous cardiac testing   Echo: Date: Results:    Stress Test:  Date: 2021 Results:  Result Text     The nuclear stress test is negative for inducible myocardial ischemia or infarction.     Left ventricular function is normal, EF >60%.     There is no prior study for comparison.    ECG Reviewed:  Date: 10/27/2022 Results:  Sinus rhythm  Possible Left atrial enlargement  Borderline ECG  When compared with ECG of 2021 18:31,  No significant change was found  Cath:  Date: Results:      METS/Exercise Tolerance:  Comment: Able to walk up 4 flights of stairs to his apartment.  "Does pushups, stretching on a regular basis. Denies chest pain/pressure, GOTTI, orthopnea, edema   Hematologic:     (+)      anemia,       (-) history of blood clots and history of blood transfusion   Musculoskeletal:  - neg musculoskeletal ROS     GI/Hepatic: Comment: Liver mass  Retroperitoneal lipoma    (+) GERD (Rare, does not use medication),                   Renal/Genitourinary: Comment: Urinary retention with indwelling catheter  Bladder diverticulum  Hematuria  Bladder spasms    (+)        BPH,   (-) renal disease   Endo:  - neg endo ROS     Psychiatric/Substance Use:     (+) psychiatric history anxiety   Recreational drug usage: Cannabis.    Infectious Disease:  - neg infectious disease ROS     Malignancy:  - neg malignancy ROS     Other:  - neg other ROS          BP (!) 150/94 (BP Location: Right arm, Patient Position: Sitting, Cuff Size: Adult Regular)   Pulse 74   Temp 98  F (36.7  C) (Oral)   Resp 16   Ht 1.676 m (5' 6\")   Wt 81.5 kg (179 lb 9.6 oz)   SpO2 97%   BMI 28.99 kg/m      Physical Exam   Constitutional: Pleasant, well-appearing male, no apparent distress, and appears stated age.  Eyes: Pupils equal, round and reactive to light, extra ocular muscles intact, sclera clear, conjunctiva normal.  HENT: Normocephalic and atraumatic, oral pharynx with moist mucus membranes, fair dentition. No goiter appreciated.   Respiratory: Clear to auscultation bilaterally, no crackles or wheezing.  Cardiovascular: Regular rate and rhythm, normal S1 and S2, and no murmur noted.  Carotids +2, no bruits. No edema. Palpable pulses to radial  DP and PT arteries.   GI: Normal bowel sounds, soft, non-distended, non-tender, no masses palpated, no hepatosplenomegaly.  Lymph/Hematologic: No cervical lymphadenopathy and no supraclavicular lymphadenopathy.  Genitourinary:  Deferred  Skin: Warm and dry.  No rashes on exposed skin   Musculoskeletal: Full ROM of neck. There is no redness, warmth, or swelling of the " visible joints. Gross motor strength is normal.    Neurologic: Awake, alert, oriented to name, place and time. Cranial nerves II-XII are grossly intact. Gait is normal.   Neuropsychiatric: Calm, cooperative. Normal affect.       Prior Labs/Diagnostic Studies   All labs and imaging personally reviewed     EKG/ stress test - if available please see in ROS above   No results found.    The patient's records and results personally reviewed by this provider.     Outside records reviewed from: Care Everywhere    LAB/DIAGNOSTIC STUDIES TODAY:  T&S, hgb    Assessment  Roni Rebolledo is a 68 year old male seen as a PAC referral for risk assessment and optimization for anesthesia.    Plan/Recommendations  Pt will be optimized for the proposed procedure.  See below for details on the assessment, risk, and preoperative recommendations    NEUROLOGY  - No history of TIA, CVA or seizure  - Neuropathy affecting bilateral hands    -Post Op delirium risk factors:  Age    ENT  - No current airway concerns.  Will need to be reassessed day of surgery.  Mallampati: I  TM: > 3    CARDIAC  - No history of CAD and Afib  - Hx of Hypertension, not currently on medication.   - Hx of ascending aortic aneurysm, 4.0 cm on last imaging.   - Patient denies other cardiac history or current cardiac symptoms. He is able to ascend the 4 flights of stairs to his apartment without stopping.   - Most recent cardiac testing as above    - METS (Metabolic Equivalents)  Patient performs 4 or more METS exercise without symptoms            Total Score: 0      RCRI-Very low risk: Class 1 0.4% complication rate            Total Score: 0        PULMONARY    SHAWNA Medium Risk            Total Score: 4    SHAWNA: Snores loudly    HSAWNA: Hypertension    SHAWNA: Over 50 ys old    SHAWNA: Male      - Denies asthma or inhaler use  - Tobacco History    History   Smoking Status    Former    Packs/day: 0.25    Years: 7.00    Types: Cigarettes    Quit date: 1/3/1980   Smokeless Tobacco  "   Never       GI  - GERD  Reports rare GERD symptoms. Does not use medication.  PONV Medium Risk  Total Score: 2           1 AN PONV: Patient is not a current smoker    1 AN PONV: Intended Post Op Opioids        /RENAL  - BPH, urinary retention, bladder diverticulum. Surgery as scheduled above.  - Baseline Creatinine  0.86    ENDOCRINE    - BMI: Estimated body mass index is 28.99 kg/m  as calculated from the following:    Height as of this encounter: 1.676 m (5' 6\").    Weight as of this encounter: 81.5 kg (179 lb 9.6 oz).  Overweight (BMI 25.0-29.9)  - No history of Diabetes Mellitus    HEME  VTE Low Risk 0.5%            Total Score: 3    VTE: Greater than 59 yrs old    VTE: Male      - No history of abnormal bleeding or antiplatelet use.  - Acute blood loss anemia  Recommend perioperative use of blood conservation techniques intraoperatively and close monitoring for postoperative bleeding.  A type and screen has been ordered for this patient      PSYCH  - Anxiety, not currently on medications    Different anesthesia methods/types have been discussed with the patient, but they are aware that the final plan will be decided by the assigned anesthesia provider on the date of service.    The patient is optimized for their procedure. AVS with information on surgery time/arrival time, meds and NPO status given by nursing staff. No further diagnostic testing indicated.      On the day of service:     Prep time: 15 minutes  Visit time: 20 minutes  Documentation time: 15 minutes  ------------------------------------------  Total time: 50 minutes      Shital Pineda PA-C  Preoperative Assessment Center  [facility]  Clinic and Surgery Center  Phone: [phone]  Fax: [phone]    "

## 2023-08-31 NOTE — PATIENT INSTRUCTIONS
Preparing for Your Surgery      Name:  Roni Rebolledo   MRN:  [id number]   :  1955   Today's Date:  2023       Arriving for surgery:  Surgery date:  23  Arrival time:  10.25AM    Please come to:     Please come to:      [facility] [address]  [address] [place]  [address]  The Green Ramp for patients and visitors is located beneath the [facility]. The parking facility entrance is at the intersection of [address] [place] [address] and [address]. Patients and visitors who self-park will receive the reduced hospital parking rate (no ticket validation needed).  [employer] parking, located at the [facility] main entrance on [address] [place] [address], is available Monday - Friday from 7 am to 3:30 pm.  Discounted parking pass options can be purchased from  attendants during business hours.  -Check in at the security desk in the [facility] ([facility]) Lobby. They will direct you to the correct elevators.  -Proceed to the 3rd floor, check in at the Adult Surgery Waiting Lounge. [phone]  If you are in need of directions, a wheelchair or escort please stop at the Information Desk in the lobby.  Inform the information person that you are here for surgery; a wheelchair and escort to [address] will be provided.   An escort to the Adult Surgery Waiting Lounge will be provided.    What can I eat or drink?  -  You may eat and drink normally up to 8 hours prior to arrival time. (Until 2.25AM)  -  You may have clear liquids until 2 hours prior to arrival time. (Until 8.25AM)    Examples of clear liquids:  Water  Clear broth  Juices (apple, white grape, white cranberry  and cider) without pulp  Noncarbonated, powder based beverages  (lemonade and Richardson-Aid)  Sodas (Sprite, 7-Up, ginger ale and seltzer)  Coffee or tea (without milk or cream)  Gatorade    -  No Alcohol or cannabis  products for at least 24 hours before surgery.     Which medicines can I take?    Hold Aspirin for 7 days before surgery.   Hold Multivitamins for 7 days before surgery. (Vitamin-Lipotropics)  Hold Supplements for 7 days before surgery.  Hold Ibuprofen (Advil, Motrin) for 1 day(s) before surgery--unless otherwise directed by surgeon.  Hold Naproxen (Aleve) for 4 days before surgery.    -  DO NOT take these medications the day of surgery:  None.    -  PLEASE TAKE these medications the day of surgery:  None.    How do I prepare myself?  - Please take 2 showers (one the night prior to surgery and one the morning of surgery) using Scrubcare or Hibiclens soap.    Use this soap only from the neck to your toes.     Leave the soap on your skin for one minute--then rinse thoroughly.      You may use your own shampoo and conditioner. No other hair products.   - Please remove all jewelry and body piercings.  - No lotions, deodorants or fragrance.  - No makeup or fingernail polish.   - Bring your ID and insurance card.    -If you have a Deep Brain Stimulator, Spinal Cord Stimulator, or any Neuro Stimulator device---you must bring the remote control to the hospital.      ALL PATIENTS GOING HOME THE SAME DAY OF SURGERY ARE REQUIRED TO HAVE A RESPONSIBLE ADULT TO DRIVE AND BE IN ATTENDANCE WITH THEM FOR 24 HOURS FOLLOWING SURGERY.    Covid testing policy as of 12/06/2022  Your surgeon will notify and schedule you for a COVID test if one is needed before surgery--please direct any questions or COVID symptoms to your surgeon      Questions or Concerns:    - For any questions regarding the day of surgery or your hospital stay, please contact the Pre Admission Nursing Office at [phone].       - If you have health changes between today and your surgery, please call your surgeon.       - For questions after surgery, please call your surgeons office.           Current Visitor Guidelines    You may have 2 visitors in the pre op  area.    Visiting hours: 8 a.m. to 8:30 p.m.    You may have four visitors during your inpatient hospital stay.    Patients confirmed or suspected to have symptoms of COVID 19 or flu:     No visitors allowed for adult patients.   Children (under age 18) can have 1 named visitor.     People who are sick or showing symptoms of COVID 19 or flu:    Are not allowed to visit patients--we can only make exceptions in special situations.       Please follow these guidelines for your visit:          Please maintain social distance          Masking is optional--however at times you may be asked to wear a mask for the safety of yourself and others     Clean your hands with alcohol hand . Do this when you arrive at and leave the building and patient room,    And again after you touch your mask or anything in the room.     Go directly to and from the room you are visiting.     Stay in the patient s room during your visit. Limit going to other places in the hospital as much as possible     Leave bags and jackets at home or in the car.     For everyone s health, please don t come and go during your visit. That includes for smoking   during your visit.

## 2023-08-31 NOTE — H&P
Pre-Operative H & P     CC:  Preoperative exam to assess for increased cardiopulmonary risk while undergoing surgery and anesthesia.    Date of Encounter: 8/31/2023  Primary Care Physician:  Bernard Ortega     Reason for visit:   Encounter Diagnoses   Name Primary?    Pre-op evaluation Yes    Urinary retention        HPI  Roni Rebolledo is a 68 year old male who presents for pre-operative H & P in preparation for  Procedure Information       Case: [id number] Date/Time: 09/06/23 1225    Procedure: Holmium Laser Enucleation of the Prostate (Urethra)    Anesthesia type: Choice    Diagnosis: Urinary retention [R33.9]    Pre-op diagnosis: Urinary retention [R33.9]    Location: [facility]    Providers: Aris Wooten MD            Patient is being evaluated for comorbid conditions of HTN, ascending aortic aneurysm, acute blood loss anemia, cervical and lumbar radiculopathy, and anxiety.    The patient has a history of urinary retention with associated clot formation secondary to BPH, requiring indwelling urinary catheter. He is s/p prostate artery embolization a couple years ago, which initially helped his symptoms, but he has recently had a recurrence. He has been evaluated by urology and also found to have a large bladder diverticulum. During his most recent visit with Dr. Membreno on 8/29/23, treatment options were discussed and a plan was made to proceed with surgery as scheduled above.    History is obtained from the patient and chart review    Hx of abnormal bleeding or anti-platelet use: None      Past Medical History  Past Medical History:   Diagnosis Date    Anxiety 12/23/2009    Ascending aortic aneurysm 05/06/2015    Benign prostatic hyperplasia     increased PSA    Bladder diverticulum     Dr Duke    Carpal tunnel syndrome on both sides     Hypertension 12/23/2013    Immunization deficiency 05/06/2015    Lipoma 06/2023    retroperitoneal - 8.4 cm    Liver mass 06/2023    2.4 cm    Lumbar radiculopathy  2016    Onychomycosis 2016       Past Surgical History  Past Surgical History:   Procedure Laterality Date    ARTHROSCOPY KNEE WITH LATERAL MENISCECTOMY      left    ARTHROSCOPY SHOULDER      bilateral - rotator cuff repair -     COLONOSCOPY N/A 2015    Procedure: COLONOSCOPY;  Surgeon: Mariano Vigil MD;  Location:  GI    COLONOSCOPY  2019    tubular adenoma - due 5 yrs    CYSTOSCOPY, FULGURATE BLEEDERS, EVACUATE CLOT(S), COMBINED N/A 2023    Procedure: 1. Cystourethroscopy 2. Clot evacuation 3. Fulguration of bleeder;  Surgeon: Hilario Duke MD;  Location:  OR    ORTHOPEDIC SURGERY      ACL repair left    PROSTATE BIOPSY, NEEDLE, SATURATION SAMPLING      SURGICAL HISTORY OF -       anal fissure    SURGICAL HISTORY OF -   2021    Dr Rojas, prostate artery embolization       Prior to Admission Medications  Current Outpatient Medications   Medication Sig Dispense Refill    Vitamins-Lipotropics (LIPO FLAVONOID PLUS) TABS Take 6 tablets by mouth daily         Allergies  Allergies   Allergen Reactions    Lisinopril Cough       Social History  Social History     Socioeconomic History    Marital status:      Spouse name: Kayla    Number of children: 6    Years of education: 12    Highest education level: Not on file   Occupational History     Employer: [employer]   Tobacco Use    Smoking status: Former     Packs/day: 0.25     Years: 7.00     Pack years: 1.75     Types: Cigarettes     Quit date: 1/3/1980     Years since quittin.6    Smokeless tobacco: Never   Vaping Use    Vaping Use: Never used   Substance and Sexual Activity    Alcohol use: Not Currently     Comment: 4 oz of alcohol a day    Drug use: Yes     Types: Marijuana     Comment: Medical marijuana daily    Sexual activity: Not Currently     Partners: Female   Other Topics Concern    Parent/sibling w/ CABG, MI or angioplasty before 65F 55M? No   Social History  Narrative    Not on file     Social Determinants of Health     Financial Resource Strain: Not on file   Food Insecurity: Not on file   Transportation Needs: Not on file   Physical Activity: Not on file   Stress: Not on file   Social Connections: Not on file   Intimate Partner Violence: Not on file   Housing Stability: Not on file       Family History  Family History   Problem Relation Age of Onset    Other Cancer Mother         brain CA    Dementia Father         dementia    Coronary Artery Disease Brother     Brain Cancer Daughter          at age 2    Diabetes Paternal Aunt     Colon Cancer No family hx of     Anesthesia Reaction No family hx of     Venous thrombosis No family hx of        Review of Systems  The complete review of systems is negative other than noted in the HPI or here.   Anesthesia Evaluation   Pt has had prior anesthetic.     No history of anesthetic complications       ROS/MED HX  ENT/Pulmonary:     (+)     SHAWNA risk factors, snores loudly, hypertension,         tobacco use, Past use,                   (-) asthma and recent URI   Neurologic: Comment: Cervical and lumbar radiculopathy    (+)    peripheral neuropathy, - bilateral hands.                        (-) no seizures, no CVA and no TIA   Cardiovascular: Comment: Ascending aortic aneurysm 4.0 cm    (+)  hypertension- -   -  - -                                 Previous cardiac testing   Echo: Date: Results:    Stress Test:  Date: 2021 Results:  Result Text     The nuclear stress test is negative for inducible myocardial ischemia or infarction.     Left ventricular function is normal, EF >60%.     There is no prior study for comparison.    ECG Reviewed:  Date: 10/27/2022 Results:  Sinus rhythm  Possible Left atrial enlargement  Borderline ECG  When compared with ECG of 2021 18:31,  No significant change was found  Cath:  Date: Results:      METS/Exercise Tolerance:  Comment: Able to walk up 4 flights of stairs to his apartment.  "Does pushups, stretching on a regular basis. Denies chest pain/pressure, GOTTI, orthopnea, edema   Hematologic:     (+)      anemia,       (-) history of blood clots and history of blood transfusion   Musculoskeletal:  - neg musculoskeletal ROS     GI/Hepatic: Comment: Liver mass  Retroperitoneal lipoma    (+) GERD (Rare, does not use medication),                   Renal/Genitourinary: Comment: Urinary retention with indwelling catheter  Bladder diverticulum  Hematuria  Bladder spasms    (+)        BPH,   (-) renal disease   Endo:  - neg endo ROS     Psychiatric/Substance Use:     (+) psychiatric history anxiety   Recreational drug usage: Cannabis.    Infectious Disease:  - neg infectious disease ROS     Malignancy:  - neg malignancy ROS     Other:  - neg other ROS          BP (!) 150/94 (BP Location: Right arm, Patient Position: Sitting, Cuff Size: Adult Regular)   Pulse 74   Temp 98  F (36.7  C) (Oral)   Resp 16   Ht 1.676 m (5' 6\")   Wt 81.5 kg (179 lb 9.6 oz)   SpO2 97%   BMI 28.99 kg/m      Physical Exam   Constitutional: Pleasant, well-appearing male, no apparent distress, and appears stated age.  Eyes: Pupils equal, round and reactive to light, extra ocular muscles intact, sclera clear, conjunctiva normal.  HENT: Normocephalic and atraumatic, oral pharynx with moist mucus membranes, fair dentition. No goiter appreciated.   Respiratory: Clear to auscultation bilaterally, no crackles or wheezing.  Cardiovascular: Regular rate and rhythm, normal S1 and S2, and no murmur noted.  Carotids +2, no bruits. No edema. Palpable pulses to radial  DP and PT arteries.   GI: Normal bowel sounds, soft, non-distended, non-tender, no masses palpated, no hepatosplenomegaly.  Lymph/Hematologic: No cervical lymphadenopathy and no supraclavicular lymphadenopathy.  Genitourinary:  Deferred  Skin: Warm and dry.  No rashes on exposed skin   Musculoskeletal: Full ROM of neck. There is no redness, warmth, or swelling of the " visible joints. Gross motor strength is normal.    Neurologic: Awake, alert, oriented to name, place and time. Cranial nerves II-XII are grossly intact. Gait is normal.   Neuropsychiatric: Calm, cooperative. Normal affect.       Prior Labs/Diagnostic Studies   All labs and imaging personally reviewed     EKG/ stress test - if available please see in ROS above   No results found.    The patient's records and results personally reviewed by this provider.     Outside records reviewed from: Care Everywhere    LAB/DIAGNOSTIC STUDIES TODAY:  T&S, hgb    Assessment  Roni Rebolledo is a 68 year old male seen as a PAC referral for risk assessment and optimization for anesthesia.    Plan/Recommendations  Pt will be optimized for the proposed procedure.  See below for details on the assessment, risk, and preoperative recommendations    NEUROLOGY  - No history of TIA, CVA or seizure  - Neuropathy affecting bilateral hands    -Post Op delirium risk factors:  Age    ENT  - No current airway concerns.  Will need to be reassessed day of surgery.  Mallampati: I  TM: > 3    CARDIAC  - No history of CAD and Afib  - Hx of Hypertension, not currently on medication.   - Hx of ascending aortic aneurysm, 4.0 cm on last imaging.   - Patient denies other cardiac history or current cardiac symptoms. He is able to ascend the 4 flights of stairs to his apartment without stopping.   - Most recent cardiac testing as above    - METS (Metabolic Equivalents)  Patient performs 4 or more METS exercise without symptoms            Total Score: 0      RCRI-Very low risk: Class 1 0.4% complication rate            Total Score: 0        PULMONARY    SHAWNA Medium Risk            Total Score: 4    SHAWNA: Snores loudly    SHAWNA: Hypertension    SHAWNA: Over 50 ys old    SHAWNA: Male      - Denies asthma or inhaler use  - Tobacco History    History   Smoking Status    Former    Packs/day: 0.25    Years: 7.00    Types: Cigarettes    Quit date: 1/3/1980   Smokeless Tobacco  "   Never       GI  - GERD  Reports rare GERD symptoms. Does not use medication.  PONV Medium Risk  Total Score: 2           1 AN PONV: Patient is not a current smoker    1 AN PONV: Intended Post Op Opioids        /RENAL  - BPH, urinary retention, bladder diverticulum. Surgery as scheduled above.  - Baseline Creatinine  0.86    ENDOCRINE    - BMI: Estimated body mass index is 28.99 kg/m  as calculated from the following:    Height as of this encounter: 1.676 m (5' 6\").    Weight as of this encounter: 81.5 kg (179 lb 9.6 oz).  Overweight (BMI 25.0-29.9)  - No history of Diabetes Mellitus    HEME  VTE Low Risk 0.5%            Total Score: 3    VTE: Greater than 59 yrs old    VTE: Male      - No history of abnormal bleeding or antiplatelet use.  - Acute blood loss anemia  Recommend perioperative use of blood conservation techniques intraoperatively and close monitoring for postoperative bleeding.  A type and screen has been ordered for this patient      PSYCH  - Anxiety, not currently on medications    Different anesthesia methods/types have been discussed with the patient, but they are aware that the final plan will be decided by the assigned anesthesia provider on the date of service.    The patient is optimized for their procedure. AVS with information on surgery time/arrival time, meds and NPO status given by nursing staff. No further diagnostic testing indicated.      On the day of service:     Prep time: 15 minutes  Visit time: 20 minutes  Documentation time: 15 minutes  ------------------------------------------  Total time: 50 minutes      Shital Pineda PA-C  Preoperative Assessment Center  [facility]  [facility]  Phone: [phone]  Fax: [phone]    "

## 2023-09-06 ENCOUNTER — ANESTHESIA (OUTPATIENT)
Dept: SURGERY | Facility: CLINIC | Age: 68
End: 2023-09-06
Payer: MEDICARE

## 2023-09-06 ENCOUNTER — HOSPITAL ENCOUNTER (OUTPATIENT)
Facility: CLINIC | Age: 68
Discharge: HOME | End: 2023-09-07
Attending: UROLOGY | Admitting: UROLOGY
Payer: MEDICARE

## 2023-09-06 DIAGNOSIS — N40.1 BPH WITH OBSTRUCTION/LOWER URINARY TRACT SYMPTOMS: Primary | ICD-10-CM

## 2023-09-06 DIAGNOSIS — N13.8 BPH WITH OBSTRUCTION/LOWER URINARY TRACT SYMPTOMS: Primary | ICD-10-CM

## 2023-09-06 LAB — GLUCOSE BLDC GLUCOMTR-MCNC: 96 MG/DL (ref 70–99)

## 2023-09-06 PROCEDURE — 250N000011 HC RX IP 250 OP 636: Performed by: NURSE ANESTHETIST, CERTIFIED REGISTERED

## 2023-09-06 PROCEDURE — 250N000025 HC SEVOFLURANE, PER MIN: Performed by: UROLOGY

## 2023-09-06 PROCEDURE — 272N000001 HC OR GENERAL SUPPLY STERILE: Performed by: UROLOGY

## 2023-09-06 PROCEDURE — 258N000003 HC RX IP 258 OP 636: Performed by: NURSE ANESTHETIST, CERTIFIED REGISTERED

## 2023-09-06 PROCEDURE — 250N000011 HC RX IP 250 OP 636: Performed by: ANESTHESIOLOGY

## 2023-09-06 PROCEDURE — 250N000011 HC RX IP 250 OP 636: Performed by: UROLOGY

## 2023-09-06 PROCEDURE — 370N000017 HC ANESTHESIA TECHNICAL FEE, PER MIN: Performed by: UROLOGY

## 2023-09-06 PROCEDURE — 52649 PROSTATE LASER ENUCLEATION: CPT | Mod: GC | Performed by: UROLOGY

## 2023-09-06 PROCEDURE — 258N000003 HC RX IP 258 OP 636: Performed by: UROLOGY

## 2023-09-06 PROCEDURE — 82962 GLUCOSE BLOOD TEST: CPT

## 2023-09-06 PROCEDURE — 360N000077 HC SURGERY LEVEL 4, PER MIN: Performed by: UROLOGY

## 2023-09-06 PROCEDURE — C1758 CATHETER, URETERAL: HCPCS | Performed by: UROLOGY

## 2023-09-06 PROCEDURE — 999N000141 HC STATISTIC PRE-PROCEDURE NURSING ASSESSMENT: Performed by: UROLOGY

## 2023-09-06 PROCEDURE — 250N000013 HC RX MED GY IP 250 OP 250 PS 637: Performed by: STUDENT IN AN ORGANIZED HEALTH CARE EDUCATION/TRAINING PROGRAM

## 2023-09-06 PROCEDURE — 710N000010 HC RECOVERY PHASE 1, LEVEL 2, PER MIN: Performed by: UROLOGY

## 2023-09-06 PROCEDURE — 88305 TISSUE EXAM BY PATHOLOGIST: CPT | Mod: 26 | Performed by: PATHOLOGY

## 2023-09-06 PROCEDURE — 88305 TISSUE EXAM BY PATHOLOGIST: CPT | Mod: TC | Performed by: UROLOGY

## 2023-09-06 PROCEDURE — 250N000009 HC RX 250: Performed by: NURSE ANESTHETIST, CERTIFIED REGISTERED

## 2023-09-06 PROCEDURE — 258N000001 HC RX 258: Performed by: STUDENT IN AN ORGANIZED HEALTH CARE EDUCATION/TRAINING PROGRAM

## 2023-09-06 RX ORDER — LIDOCAINE 40 MG/G
CREAM TOPICAL
Status: DISCONTINUED | OUTPATIENT
Start: 2023-09-06 | End: 2023-09-07 | Stop reason: HOSPADM

## 2023-09-06 RX ORDER — ONDANSETRON 2 MG/ML
4 INJECTION INTRAMUSCULAR; INTRAVENOUS EVERY 30 MIN PRN
Status: DISCONTINUED | OUTPATIENT
Start: 2023-09-06 | End: 2023-09-06 | Stop reason: HOSPADM

## 2023-09-06 RX ORDER — PROCHLORPERAZINE MALEATE 5 MG
5 TABLET ORAL EVERY 6 HOURS PRN
Status: DISCONTINUED | OUTPATIENT
Start: 2023-09-06 | End: 2023-09-07 | Stop reason: HOSPADM

## 2023-09-06 RX ORDER — DEXAMETHASONE SODIUM PHOSPHATE 4 MG/ML
INJECTION, SOLUTION INTRA-ARTICULAR; INTRALESIONAL; INTRAMUSCULAR; INTRAVENOUS; SOFT TISSUE PRN
Status: DISCONTINUED | OUTPATIENT
Start: 2023-09-06 | End: 2023-09-06

## 2023-09-06 RX ORDER — DEXMEDETOMIDINE HYDROCHLORIDE 4 UG/ML
INJECTION, SOLUTION INTRAVENOUS PRN
Status: DISCONTINUED | OUTPATIENT
Start: 2023-09-06 | End: 2023-09-06

## 2023-09-06 RX ORDER — ACETAMINOPHEN 325 MG/1
975 TABLET ORAL EVERY 8 HOURS
Status: DISCONTINUED | OUTPATIENT
Start: 2023-09-06 | End: 2023-09-07 | Stop reason: HOSPADM

## 2023-09-06 RX ORDER — OXYCODONE HYDROCHLORIDE 5 MG/1
5 TABLET ORAL EVERY 4 HOURS PRN
Status: DISCONTINUED | OUTPATIENT
Start: 2023-09-06 | End: 2023-09-07 | Stop reason: HOSPADM

## 2023-09-06 RX ORDER — BISACODYL 10 MG
10 SUPPOSITORY, RECTAL RECTAL DAILY PRN
Status: DISCONTINUED | OUTPATIENT
Start: 2023-09-06 | End: 2023-09-07 | Stop reason: HOSPADM

## 2023-09-06 RX ORDER — NITROFURANTOIN 25; 75 MG/1; MG/1
100 CAPSULE ORAL 2 TIMES DAILY
Qty: 14 CAPSULE | Refills: 0 | Status: SHIPPED | OUTPATIENT
Start: 2023-09-06 | End: 2023-09-13

## 2023-09-06 RX ORDER — SODIUM CHLORIDE, SODIUM LACTATE, POTASSIUM CHLORIDE, CALCIUM CHLORIDE 600; 310; 30; 20 MG/100ML; MG/100ML; MG/100ML; MG/100ML
INJECTION, SOLUTION INTRAVENOUS CONTINUOUS PRN
Status: DISCONTINUED | OUTPATIENT
Start: 2023-09-06 | End: 2023-09-06

## 2023-09-06 RX ORDER — LIDOCAINE HYDROCHLORIDE 20 MG/ML
INJECTION, SOLUTION INFILTRATION; PERINEURAL PRN
Status: DISCONTINUED | OUTPATIENT
Start: 2023-09-06 | End: 2023-09-06

## 2023-09-06 RX ORDER — FENTANYL CITRATE 50 UG/ML
50 INJECTION, SOLUTION INTRAMUSCULAR; INTRAVENOUS EVERY 5 MIN PRN
Status: DISCONTINUED | OUTPATIENT
Start: 2023-09-06 | End: 2023-09-06 | Stop reason: HOSPADM

## 2023-09-06 RX ORDER — ACETAMINOPHEN 325 MG/1
650 TABLET ORAL EVERY 4 HOURS PRN
Status: DISCONTINUED | OUTPATIENT
Start: 2023-09-09 | End: 2023-09-07 | Stop reason: HOSPADM

## 2023-09-06 RX ORDER — PROPOFOL 10 MG/ML
INJECTION, EMULSION INTRAVENOUS PRN
Status: DISCONTINUED | OUTPATIENT
Start: 2023-09-06 | End: 2023-09-06

## 2023-09-06 RX ORDER — OXYCODONE HYDROCHLORIDE 10 MG/1
10 TABLET ORAL EVERY 4 HOURS PRN
Status: DISCONTINUED | OUTPATIENT
Start: 2023-09-06 | End: 2023-09-07 | Stop reason: HOSPADM

## 2023-09-06 RX ORDER — AMPICILLIN 2 G/1
2 INJECTION, POWDER, FOR SOLUTION INTRAVENOUS ONCE
Status: COMPLETED | OUTPATIENT
Start: 2023-09-06 | End: 2023-09-06

## 2023-09-06 RX ORDER — ONDANSETRON 2 MG/ML
4 INJECTION INTRAMUSCULAR; INTRAVENOUS EVERY 6 HOURS PRN
Status: DISCONTINUED | OUTPATIENT
Start: 2023-09-06 | End: 2023-09-07 | Stop reason: HOSPADM

## 2023-09-06 RX ORDER — TAMSULOSIN HYDROCHLORIDE 0.4 MG/1
0.4 CAPSULE ORAL DAILY
Status: DISCONTINUED | OUTPATIENT
Start: 2023-09-06 | End: 2023-09-07 | Stop reason: HOSPADM

## 2023-09-06 RX ORDER — SODIUM CHLORIDE, SODIUM LACTATE, POTASSIUM CHLORIDE, CALCIUM CHLORIDE 600; 310; 30; 20 MG/100ML; MG/100ML; MG/100ML; MG/100ML
INJECTION, SOLUTION INTRAVENOUS CONTINUOUS
Status: DISCONTINUED | OUTPATIENT
Start: 2023-09-06 | End: 2023-09-06 | Stop reason: HOSPADM

## 2023-09-06 RX ORDER — FENTANYL CITRATE 50 UG/ML
INJECTION, SOLUTION INTRAMUSCULAR; INTRAVENOUS PRN
Status: DISCONTINUED | OUTPATIENT
Start: 2023-09-06 | End: 2023-09-06

## 2023-09-06 RX ORDER — ONDANSETRON 4 MG/1
4 TABLET, ORALLY DISINTEGRATING ORAL EVERY 30 MIN PRN
Status: DISCONTINUED | OUTPATIENT
Start: 2023-09-06 | End: 2023-09-06 | Stop reason: HOSPADM

## 2023-09-06 RX ORDER — POLYETHYLENE GLYCOL 3350 17 G/17G
17 POWDER, FOR SOLUTION ORAL DAILY
Status: DISCONTINUED | OUTPATIENT
Start: 2023-09-07 | End: 2023-09-07 | Stop reason: HOSPADM

## 2023-09-06 RX ORDER — FENTANYL CITRATE 50 UG/ML
25 INJECTION, SOLUTION INTRAMUSCULAR; INTRAVENOUS EVERY 5 MIN PRN
Status: DISCONTINUED | OUTPATIENT
Start: 2023-09-06 | End: 2023-09-06 | Stop reason: HOSPADM

## 2023-09-06 RX ORDER — HYDROMORPHONE HYDROCHLORIDE 1 MG/ML
0.4 INJECTION, SOLUTION INTRAMUSCULAR; INTRAVENOUS; SUBCUTANEOUS EVERY 5 MIN PRN
Status: DISCONTINUED | OUTPATIENT
Start: 2023-09-06 | End: 2023-09-06 | Stop reason: HOSPADM

## 2023-09-06 RX ORDER — HYDROMORPHONE HYDROCHLORIDE 1 MG/ML
0.2 INJECTION, SOLUTION INTRAMUSCULAR; INTRAVENOUS; SUBCUTANEOUS EVERY 5 MIN PRN
Status: DISCONTINUED | OUTPATIENT
Start: 2023-09-06 | End: 2023-09-06 | Stop reason: HOSPADM

## 2023-09-06 RX ORDER — GINSENG 100 MG
CAPSULE ORAL 3 TIMES DAILY
Status: DISCONTINUED | OUTPATIENT
Start: 2023-09-06 | End: 2023-09-07 | Stop reason: HOSPADM

## 2023-09-06 RX ORDER — AMOXICILLIN 250 MG
1 CAPSULE ORAL 2 TIMES DAILY
Status: DISCONTINUED | OUTPATIENT
Start: 2023-09-06 | End: 2023-09-07 | Stop reason: HOSPADM

## 2023-09-06 RX ORDER — ONDANSETRON 4 MG/1
4 TABLET, ORALLY DISINTEGRATING ORAL EVERY 6 HOURS PRN
Status: DISCONTINUED | OUTPATIENT
Start: 2023-09-06 | End: 2023-09-07 | Stop reason: HOSPADM

## 2023-09-06 RX ADMIN — PHENYLEPHRINE HYDROCHLORIDE 100 MCG: 10 INJECTION INTRAVENOUS at 12:49

## 2023-09-06 RX ADMIN — PHENYLEPHRINE HYDROCHLORIDE 100 MCG: 10 INJECTION INTRAVENOUS at 12:35

## 2023-09-06 RX ADMIN — HYDROMORPHONE HYDROCHLORIDE 0.2 MG: 1 INJECTION, SOLUTION INTRAMUSCULAR; INTRAVENOUS; SUBCUTANEOUS at 15:30

## 2023-09-06 RX ADMIN — GENTAMICIN SULFATE 340 MG: 40 INJECTION, SOLUTION INTRAMUSCULAR; INTRAVENOUS at 12:17

## 2023-09-06 RX ADMIN — PHENYLEPHRINE HYDROCHLORIDE 50 MCG: 10 INJECTION INTRAVENOUS at 12:19

## 2023-09-06 RX ADMIN — SODIUM CHLORIDE 3000 ML: 900 IRRIGANT IRRIGATION at 20:39

## 2023-09-06 RX ADMIN — DEXMEDETOMIDINE 8 MCG: 100 INJECTION, SOLUTION, CONCENTRATE INTRAVENOUS at 14:28

## 2023-09-06 RX ADMIN — DEXMEDETOMIDINE 12 MCG: 100 INJECTION, SOLUTION, CONCENTRATE INTRAVENOUS at 14:29

## 2023-09-06 RX ADMIN — SODIUM CHLORIDE, POTASSIUM CHLORIDE, SODIUM LACTATE AND CALCIUM CHLORIDE: 600; 310; 30; 20 INJECTION, SOLUTION INTRAVENOUS at 12:04

## 2023-09-06 RX ADMIN — Medication 20 MG: at 13:02

## 2023-09-06 RX ADMIN — LIDOCAINE HYDROCHLORIDE 50 MG: 20 INJECTION, SOLUTION INFILTRATION; PERINEURAL at 12:15

## 2023-09-06 RX ADMIN — PHENYLEPHRINE HYDROCHLORIDE 150 MCG: 10 INJECTION INTRAVENOUS at 12:17

## 2023-09-06 RX ADMIN — SENNOSIDES AND DOCUSATE SODIUM 1 TABLET: 50; 8.6 TABLET ORAL at 20:40

## 2023-09-06 RX ADMIN — PHENYLEPHRINE HYDROCHLORIDE 0.3 MCG/KG/MIN: 10 INJECTION INTRAVENOUS at 13:16

## 2023-09-06 RX ADMIN — AMPICILLIN 2 G: 2 INJECTION, POWDER, FOR SOLUTION INTRAMUSCULAR; INTRAVENOUS at 12:23

## 2023-09-06 RX ADMIN — Medication 20 MG: at 13:52

## 2023-09-06 RX ADMIN — PHENYLEPHRINE HYDROCHLORIDE 100 MCG: 10 INJECTION INTRAVENOUS at 12:28

## 2023-09-06 RX ADMIN — FENTANYL CITRATE 25 MCG: 50 INJECTION, SOLUTION INTRAMUSCULAR; INTRAVENOUS at 14:55

## 2023-09-06 RX ADMIN — PROPOFOL 100 MG: 10 INJECTION, EMULSION INTRAVENOUS at 13:52

## 2023-09-06 RX ADMIN — Medication 50 MG: at 12:15

## 2023-09-06 RX ADMIN — DEXAMETHASONE SODIUM PHOSPHATE 6 MG: 4 INJECTION, SOLUTION INTRA-ARTICULAR; INTRALESIONAL; INTRAMUSCULAR; INTRAVENOUS; SOFT TISSUE at 12:38

## 2023-09-06 RX ADMIN — PROPOFOL 200 MG: 10 INJECTION, EMULSION INTRAVENOUS at 12:15

## 2023-09-06 RX ADMIN — FENTANYL CITRATE 100 MCG: 50 INJECTION, SOLUTION INTRAMUSCULAR; INTRAVENOUS at 12:14

## 2023-09-06 RX ADMIN — MIDAZOLAM 2 MG: 1 INJECTION INTRAMUSCULAR; INTRAVENOUS at 12:04

## 2023-09-06 RX ADMIN — ACETAMINOPHEN 975 MG: 325 TABLET, FILM COATED ORAL at 15:21

## 2023-09-06 RX ADMIN — FENTANYL CITRATE 25 MCG: 50 INJECTION, SOLUTION INTRAMUSCULAR; INTRAVENOUS at 15:03

## 2023-09-06 RX ADMIN — ACETAMINOPHEN 975 MG: 325 TABLET, FILM COATED ORAL at 23:08

## 2023-09-06 RX ADMIN — PHENYLEPHRINE HYDROCHLORIDE 200 MCG: 10 INJECTION INTRAVENOUS at 12:25

## 2023-09-06 RX ADMIN — HYDROMORPHONE HYDROCHLORIDE 0.2 MG: 1 INJECTION, SOLUTION INTRAMUSCULAR; INTRAVENOUS; SUBCUTANEOUS at 15:16

## 2023-09-06 RX ADMIN — PHENYLEPHRINE HYDROCHLORIDE 0.5 MCG/KG/MIN: 10 INJECTION INTRAVENOUS at 12:49

## 2023-09-06 ASSESSMENT — ACTIVITIES OF DAILY LIVING (ADL)
ADLS_ACUITY_SCORE: 31
ADLS_ACUITY_SCORE: 31
ADLS_ACUITY_SCORE: 35
ADLS_ACUITY_SCORE: 35
ADLS_ACUITY_SCORE: 31
ADLS_ACUITY_SCORE: 35
ADLS_ACUITY_SCORE: 35

## 2023-09-06 NOTE — OP NOTE
OPERATIVE REPORT    PREOPERATIVE DIAGNOSIS: Benign Prostatic Hyperplasia     POSTOPERATIVE DIAGNOSIS: Same    PROCEDURES PERFORMED:   Holmium Laser Enucleation of the Prostate (HoLEP)    STAFF SURGEON: Aris Wooten MD  ASSISTANTS: Aishwarya Devries MD    ANESTHESIA: General  ESTIMATED BLOOD LOSS: 20 ml  COMPLICATIONS: None.   SPECIMEN: Prostate Tissue     SIGNIFICANT FINDINGS:   Enucleation time: 66 min  Morcellation time: 10 min  Tissue weight: 68 g  Total energy: 171.31 kJ    BRIEF OPERATIVE INDICATIONS: Roni Rebolledo is a 68 year old year old male who presented with urinary retention and enlarged prostate of 120 grams.  He had a prostatic artery embolization 2 years ago.  After a discussion of all risks, benefits, and alternatives, the patient elected to proceed with HoLEP.    DESCRIPTION OF PROCEDURE:  After informed consent was obtained, the patient was transported to the operating room & placed supine on the table. After adequate anesthesia was induced, the patient was placed in lithotomy and prepped and draped in the usual sterile fashion. A timeout was taken to confirm correct patient, procedure and laterality. Pre-operative IV antibiotics were administered.     The urethra was injected with lubricant jelly and was calibrated with sounds from 22 fr to 30 fr sequentially in 2 fr increments.  These passed easily, and then 28 fr sheath was placed with the obturator.  The bladder was unremarkable.  The ureteral orifices were orthotopic.  The prostate had Trilobar anatomy with outlet obstruction.    The dissection was started at the apex by demarcating a line circumferentially just proximal to the urinary sphincter.    Incisions were made just proximal to the verumontanum in an inverted U shape to demarcate the distal extent of the incision. We started at the bladder neck and made incision at the 6 clock position and took the incision to the verumontanum. This incision was deepened until we reached the capsule.      Incisions were then made just lateral to the verumontanum to find the transition zone enucleation plane. The right plane was dissected a combination of laser and blunt dissection.  As the plane was developed, the previously incised mucosa was continually checked to ensure detachment. This plane was taken up to the 12 o' clock position. The anterior enucleation was carried to the bladder neck.  After ensuring careful hemostasis as the bladder is entered through the bladder neck, the 10-2 o'clock bladder neck attachments were dissected.  The right side was then further dissected, carrying the dissection from the bladder neck down to the posterior side, and then working the tissue forward. We next identified the mucosal strip anteriorly and transected it with the laser.  We then made incision working from apex to the bladder neck in the midline  the right and the left lobes. The posterior and lateral plan was joined and the adenoma was enucleated to the bladder neck.  The bladder neck mucosa was then cut and then the right lateral lobe adenoma was liberated into the bladder.    Incisions were then made just lateral on the left of the verumontanum to find the transition zone enucleation plane. As the plane was developed, the previously incised mucosa was continually checked to ensure detachment. This plane was taken up to the 12 o' clock position. The left side was then further dissected, carrying the dissection from the bladder neck down to the posterior side, and then working the tissue forward. We next identified the mucosal strip anteriorly and transected it with the laser and ensured the adenoma was completely free from the apex. We then proceeded to take down the remaining lateral and posterior attachments which allowed us to push the adenoma into the bladder. The bladder neck mucosa was then cut and then the left lateral lobe adenoma was liberated into the bladder.      The prostatic fossa was  examined and meticulous hemostasis was achieved with the laser, with special attention to the bladder neck and apex.    The laserscope was removed and the extra long offset nephroscope was inserted and the [place] morcellator was introduced.  After attaching two inflows and confirming they were open and bladder was distended, morcellation was carried out at a rate of 1475  oscillations/min.  After the adenoma was morcellated, the fossa was inspected again and any areas of bleeding were lasered for hemostasis.    A 22 fr 3 way mcgill catheter was inserted with 60 ml in the balloon.  Continuous bladder irrigation was started and patient was undraped.  They were awakened from anesthesia and transferred to the PACU.       POSTOP PLAN:  Patient will be admitted postoperatively and catheter will be removed on POD1.  Labs CBC/BMP in AM  Home Antibiotics Macrobid 100 bid 1 week    I, Aris Wooten, was present for the entire case on 09/06/23.

## 2023-09-06 NOTE — ANESTHESIA CARE TRANSFER NOTE
Patient: Roni Rebolledo    Procedure: Procedure(s):  Holmium Laser Enucleation of the Prostate       Diagnosis: Urinary retention [R33.9]  Diagnosis Additional Information: No value filed.    Anesthesia Type:   General     Note:    Oropharynx: oropharynx clear of all foreign objects and spontaneously breathing  Level of Consciousness: awake  Oxygen Supplementation: face mask  Level of Supplemental Oxygen (L/min / FiO2): 8  Independent Airway: airway patency satisfactory and stable  Dentition: dentition unchanged  Vital Signs Stable: post-procedure vital signs reviewed and stable  Report to RN Given: handoff report given  Patient transferred to: PACU    Handoff Report: Identifed the Patient, Identified the Reponsible Provider, Reviewed the pertinent medical history, Discussed the surgical course, Reviewed Intra-OP anesthesia mangement and issues during anesthesia, Set expectations for post-procedure period and Allowed opportunity for questions and acknowledgement of understanding      Vitals:  Vitals Value Taken Time   /74 09/06/23 1431   Temp     Pulse 64 09/06/23 1441   Resp 8 09/06/23 1441   SpO2 97 % 09/06/23 1441   Vitals shown include unvalidated device data.    Electronically Signed By: NIECY Clemens CRNA  September 6, 2023  2:42 PM

## 2023-09-06 NOTE — ANESTHESIA PREPROCEDURE EVALUATION
Anesthesia Pre-Procedure Evaluation    Patient: Roni Rebolledo   MRN: [id number] : 1955        Procedure : Procedure(s):  Holmium Laser Enucleation of the Prostate          Past Medical History:   Diagnosis Date    Anxiety 2009    Ascending aortic aneurysm 2015    Benign prostatic hyperplasia     increased PSA    Bladder diverticulum     Dr Duke    Carpal tunnel syndrome on both sides     Hypertension 2013    Immunization deficiency 2015    Lipoma 2023    retroperitoneal - 8.4 cm    Liver mass 2023    2.4 cm    Lumbar radiculopathy 2016    Onychomycosis 2016      Past Surgical History:   Procedure Laterality Date    ARTHROSCOPY KNEE WITH LATERAL MENISCECTOMY      left    ARTHROSCOPY SHOULDER      bilateral - rotator cuff repair -     COLONOSCOPY N/A 2015    Procedure: COLONOSCOPY;  Surgeon: Mariano Vigil MD;  Location:  GI    COLONOSCOPY  2019    tubular adenoma - due 5 yrs    CYSTOSCOPY, FULGURATE BLEEDERS, EVACUATE CLOT(S), COMBINED N/A 2023    Procedure: 1. Cystourethroscopy 2. Clot evacuation 3. Fulguration of bleeder;  Surgeon: Hilario Duke MD;  Location:  OR    ORTHOPEDIC SURGERY      ACL repair left    PROSTATE BIOPSY, NEEDLE, SATURATION SAMPLING      SURGICAL HISTORY OF -       anal fissure    SURGICAL HISTORY OF -   2021    Dr Rojas, prostate artery embolization      Allergies   Allergen Reactions    Lisinopril Cough      Social History     Tobacco Use    Smoking status: Former     Packs/day: 0.25     Years: 7.00     Pack years: 1.75     Types: Cigarettes     Quit date: 1/3/1980     Years since quittin.7    Smokeless tobacco: Never   Substance Use Topics    Alcohol use: Not Currently     Comment: 4 oz of alcohol a day      Wt Readings from Last 1 Encounters:   23 78.5 kg (173 lb 1 oz)        Anesthesia Evaluation            ROS/MED HX  ENT/Pulmonary:       Neurologic:       Cardiovascular:      (+)  hypertension- -   -  - -                                      METS/Exercise Tolerance:     Hematologic:       Musculoskeletal:       GI/Hepatic:       Renal/Genitourinary:       Endo:       Psychiatric/Substance Use:       Infectious Disease:       Malignancy:       Other:            Physical Exam    Airway        Mallampati: I   TM distance: > 3 FB   Neck ROM: full   Mouth opening: > 3 cm    Respiratory Devices and Support         Dental       (+) Minor Abnormalities - some fillings, tiny chips      Cardiovascular   cardiovascular exam normal       Rhythm and rate: regular     Pulmonary   pulmonary exam normal                OUTSIDE LABS:  CBC:   Lab Results   Component Value Date    WBC 7.8 08/22/2023    WBC 8.8 08/21/2023    HGB 12.5 (L) 08/31/2023    HGB 10.2 (L) 08/22/2023    HCT 30.9 (L) 08/22/2023    HCT 44.2 08/21/2023     08/22/2023     08/21/2023     BMP:   Lab Results   Component Value Date     08/22/2023     08/21/2023    POTASSIUM 4.4 08/22/2023    POTASSIUM 4.3 08/21/2023    CHLORIDE 106 08/22/2023    CHLORIDE 102 08/21/2023    CO2 27 08/22/2023    CO2 22 08/21/2023    BUN 16.1 08/22/2023    BUN 27.6 (H) 08/21/2023    CR 0.86 08/22/2023    CR 1.0 08/21/2023    GLC 96 09/06/2023     (H) 08/22/2023     COAGS:   Lab Results   Component Value Date    INR 0.99 07/03/2013     POC: No results found for: BGM, HCG, HCGS  HEPATIC:   Lab Results   Component Value Date    ALBUMIN 3.3 (L) 08/22/2023    PROTTOTAL 4.8 (L) 08/22/2023    ALT 9 08/22/2023    AST 17 08/22/2023    ALKPHOS 44 08/22/2023    BILITOTAL 0.3 08/22/2023     OTHER:   Lab Results   Component Value Date    A1C 5.8 05/06/2015    ANA 8.2 (L) 08/22/2023    LIPASE 22 06/29/2023    AMYLASE 135 (H) 06/29/2023    TSH 1.63 11/09/2022    SED 4 06/29/2023       Anesthesia Plan    ASA Status:  2       Anesthesia Type: General.     - Airway: ETT   Induction: Intravenous.           Consents            Postoperative  Care       PONV prophylaxis: Ondansetron (or other 5HT-3), Dexamethasone or Solumedrol     Comments:                Neville Selby DO

## 2023-09-06 NOTE — DISCHARGE INSTRUCTIONS

## 2023-09-06 NOTE — INTERVAL H&P NOTE
"I have reviewed the surgical (or preoperative) H&P that is linked to this encounter, and examined the patient. There are no significant changes. Discussed risks of HoLEP, including, but not limited to bleeding, infection, retrograde ejaculation, short/long term incontinence risks, delayed resolution of LUTS and injury to urethra and bladder.  Also discussed hospital stay and follow up.      Clinical Conditions Present on Arrival:  Clinically Significant Risk Factors Present on Admission           # Hypocalcemia: Lowest Ca = 8.2 mg/dL in last 30 days, will monitor and replace as appropriate    # Hypoalbuminemia: Lowest albumin = 3.3 g/dL in the past 30 days , will monitor as appropriate     # Overweight: Estimated body mass index is 27.93 kg/m  as calculated from the following:    Height as of this encounter: 1.676 m (5' 6\").    Weight as of this encounter: 78.5 kg (173 lb 1 oz).       "

## 2023-09-06 NOTE — ANESTHESIA PROCEDURE NOTES
Airway       Patient location during procedure: OR       Procedure Start/Stop Times: 9/6/2023 12:15 PM  Staff -        CRNA: Itzel Grace APRN CRNA       Performed By: CRNA  Consent for Airway        Urgency: elective  Indications and Patient Condition       Indications for airway management: anu-procedural       Induction type:intravenous       Mask difficulty assessment: 1 - vent by mask    Final Airway Details       Final airway type: endotracheal airway       Successful airway: ETT - single  Endotracheal Airway Details        ETT size (mm): 7.5       Cuffed: yes       Inital cuff pressure (cm H2O): 20       Successful intubation technique: video laryngoscopy       VL Blade Size: MAC 3       Grade View of Cords: 1       Adjucts: stylet       Position: Right       Measured from: lips       Secured at (cm): 24       Bite block used: None    Post intubation assessment        Placement verified by: capnometry, equal breath sounds and chest rise        Number of attempts at approach: 1       Number of other approaches attempted: 0       Secured with: silk tape       Ease of procedure: easy       Dentition: Intact and Unchanged    Medication(s) Administered   Medication Administration Time: 9/6/2023 12:15 PM

## 2023-09-06 NOTE — INTERVAL H&P NOTE
"I have reviewed the surgical (or preoperative) H&P that is linked to this encounter, and examined the patient. There are no significant changes    Clinical Conditions Present on Arrival:  Clinically Significant Risk Factors Present on Admission           # Hypocalcemia: Lowest Ca = 8.2 mg/dL in last 30 days, will monitor and replace as appropriate    # Hypoalbuminemia: Lowest albumin = 3.3 g/dL in the past 30 days , will monitor as appropriate     # Overweight: Estimated body mass index is 27.93 kg/m  as calculated from the following:    Height as of this encounter: 1.676 m (5' 6\").    Weight as of this encounter: 78.5 kg (173 lb 1 oz).       "

## 2023-09-06 NOTE — ANESTHESIA POSTPROCEDURE EVALUATION
Patient: Roni Rebolledo    Procedure: Procedure(s):  Holmium Laser Enucleation of the Prostate       Anesthesia Type:  General    Note:     Postop Pain Control: Uneventful            Sign Out: Well controlled pain   PONV: No   Neuro/Psych: Uneventful            Sign Out: Acceptable/Baseline neuro status   Airway/Respiratory: Uneventful            Sign Out: Acceptable/Baseline resp. status   CV/Hemodynamics: Uneventful            Sign Out: Acceptable CV status; No obvious hypovolemia; No obvious fluid overload   Other NRE: NONE   DID A NON-ROUTINE EVENT OCCUR? No           Last vitals:  Vitals Value Taken Time   /67 09/06/23 1500   Temp     Pulse 69 09/06/23 1504   Resp 11 09/06/23 1504   SpO2 96 % 09/06/23 1504   Vitals shown include unvalidated device data.    Electronically Signed By: Neville Selby DO  September 6, 2023  3:05 PM

## 2023-09-06 NOTE — PROGRESS NOTES
PACU to Inpatient Nursing Handoff    Patient Roni Rebolledo is a 68 year old male who speaks English.   Procedure Procedure(s):  Holmium Laser Enucleation of the Prostate   Surgeon(s) Primary: Aris Wooten MD  Resident - Assisting: Aishwarya Devries MD     Allergies   Allergen Reactions    Lisinopril Cough       Isolation  No active isolations     Past Medical History   has a past medical history of Anxiety (12/23/2009), Ascending aortic aneurysm (05/06/2015), Benign prostatic hyperplasia, Bladder diverticulum, Carpal tunnel syndrome on both sides, Hypertension (12/23/2013), Immunization deficiency (05/06/2015), Lipoma (06/2023), Liver mass (06/2023), Lumbar radiculopathy (05/17/2016), and Onychomycosis (05/17/2016).    Anesthesia Choice   Dermatome Level     Preop Meds Not applicable   Nerve block Not applicable   Intraop Meds   fentaNYL 50 mcg/mL (mcg)   Total dose: 100 mcg  Date/Time Rate/Dose/Volume Action Route Admin User Audit    09/06/23 1214 100 mcg Given Intravenous Mona Ross A, APRN CRNA     lidocaine 2% (mg)   Total dose: 50 mg  Date/Time Rate/Dose/Volume Action Route Admin User Audit    09/06/23 1215 50 mg Given Intravenous Vandana, Mona A APRN CRNA     propofol 10 mg/mL (mg)   Total dose: 300 mg  Date/Time Rate/Dose/Volume Action Route Admin User Audit    09/06/23 1215 200 mg Given Intravenous Ross, Mona A, APRN CRNA     1352 100 mg Given Intravenous RossEstherMona A, APRN CRNA     rocuronium 10 mg/mL (mg)   Total dose: 90 mg  Date/Time Rate/Dose/Volume Action Route Admin User Audit    09/06/23 1215 50 mg Given Intravenous Ross, Mona A, APRN CRNA     1302 20 mg Given Intravenous Ross, Mona A, APRN CRNA     1352 20 mg Given Intravenous Ross, Mona A, APRN CRNA edited    phenylephrine (VIVIAN-SYNEPHRINE) injection (mcg)   Total dose: 700 mcg  Date/Time Rate/Dose/Volume Action Route Admin User Audit    09/06/23 1217 150 mcg New Bag Intravenous Ross, Mona A, APRN CRNA      1219 50 mcg Bolus Intravenous RossEstherMona A, APRN CRNA     1225 200 mcg Bolus Intravenous Ross, Mona A, APRN CRNA     1228 100 mcg Bolus Intravenous Ross, Mona A, APRN CRNA     1235 100 mcg Bolus Intravenous Ross, Mona A, APRN CRNA     1249 100 mcg Bolus Intravenous RossEstherMona A, APRN CRNA     dexamethasone (DECADRON) 4 mg/mL (mg)   Total dose: 6 mg  Date/Time Rate/Dose/Volume Action Route Admin User Audit    09/06/23 1238 6 mg Given Intravenous RossEstherMona A, APRN CRNA     gentamicin (GARAMYCIN) 340 mg in sodium chloride 0.9 % 100 mL intermittent infusion (mg)   Total dose: 340 mg Dosing weight: 69.7  Date/Time Rate/Dose/Volume Action Route Admin User Audit    09/06/23 1217 340 mg (over 30 min) - 100 mL New Bag Intravenous RossMona APRN CRNA edited    ampicillin (OMNIPEN) 2 g vial to attach to  mL bag (g)   Total dose: 2 g Dosing weight: 78.5  Date/Time Rate/Dose/Volume Action Route Admin User Audit    09/06/23 1223 2 g (over 15 min) Given Intravenous Mona Ross APRN CRNA edited    midazolam 1 mg/mL (mg)   Total dose: 2 mg  Date/Time Rate/Dose/Volume Action Route Admin User Audit    09/06/23 1204 2 mg Given Intravenous Mona Ross APRN CRNA     phenylephrine 0.1 mg/mL infusion (mcg/kg/min) (mcg/kg/min)   Total dose: 2.08 mg Dosing weight: 78.5  Date/Time Rate/Dose/Volume Action Route Admin User Audit    09/06/23 1249 0.5 mcg/kg/min - 23.55 mL/hr New Bag Intravenous Ross, Mona A, APRN CRNA     1301 0.3 mcg/kg/min - 14.13 mL/hr Rate Change Intravenous Ross, Mona A, APRN CRNA     1316 0.3 mcg/kg/min - 14.13 mL/hr New Bag Intravenous Ross, Mona A, APRN CRNA     1336 0.5 mcg/kg/min - 23.55 mL/hr Rate Change Intravenous Ross, Mona A, APRN CRNA     1356  Stopped Intravenous Ross, Mona A, APRN CRNA     dexMEDetomidine (PRECEDEX) 4 mcg/ml in NaCl 25mL (mcg)   Total dose: 20 mcg  Date/Time Rate/Dose/Volume Action Route Admin User Audit     09/06/23 1428 8 mcg Given Intravenous Mona Ross, APRN CRNA     1429 12 mcg Given Intravenous Mona Ross, APRN CRNA     LR (mL)   Total volume: 800 mL    Date/Time Rate/Dose/Volume Action Route Admin User Audit    09/06/23 1204  New Bag Intravenous Mona Ross APRN CRNA     1429 800 mL Anesthesia Volume Adjustment Intravenous Mona Ross, APRN CRNA        Local Meds Not applicable   Antibiotics ampicillin (Omnipen) - last given at 1223  gentamicin (Garamycin) - last given at 1217     Pain Patient Currently in Pain: yes   PACU meds  acetaminophen (Tylenol): 975 mg (total dose) last given at 1521   fentanyl (Sublimaze): 50 mcg (total dose) last given at 1500   hydromorphone (Dilaudid): .4 mg (total dose) last given at 1530    PCA / epidural No   Capnography     Telemetry ECG Rhythm: Normal sinus rhythm   Inpatient Telemetry Monitor Ordered? No        Labs Glucose Lab Results   Component Value Date    GLC 96 09/06/2023    GLC 91 11/09/2022    GLC 95 05/10/2021       Hgb Lab Results   Component Value Date    HGB 12.5 08/31/2023    HGB 16.0 05/10/2021       INR Lab Results   Component Value Date    INR 0.99 07/03/2013      PACU Imaging Not applicable     Wound/Incision Incision/Surgical Site 08/21/23 Penis (Active)   Incision Assessment UTV 09/06/23 1452   Closure HIREN 09/06/23 1452   Incision Drainage Amount None 09/06/23 1452   Dressing Intervention Open to air / No Dressing 09/06/23 1452   Number of days: 16      CMS        Equipment Not applicable   Other LDA       IV Access Peripheral IV Anterior;Left Hand (Active)   Site Assessment WDL 09/06/23 1452   Dressing Transparent 09/06/23 1452   Dressing Status clean;dry;intact 09/06/23 1452   Phlebitis Scale 0-->no symptoms 09/06/23 1452   Infiltration? no 09/06/23 1452   Number of days:       Blood Products Not applicable EBL 50 mL   Intake/Output Date 09/06/23 0700 - 09/07/23 0659   Shift 3809-6345 9741-8950 3671-6146 24 Hour Total   INTAKE    I.V. 800   800   IV Piggyback 100   100   Shift Total(mL/kg) 900(11.47)   900(11.47)   OUTPUT   Urine 1600   1600   Blood 50   50   Shift Total(mL/kg) 1650(21.02)   1650(21.02)   Weight (kg) 78.5 78.5 78.5 78.5      Drains / Hartman Urethral Catheter 09/06/23 Triple-lumen;Latex 22 fr (Active)   Securement Method Securing device (Describe) 09/06/23 1452   Number of days: 0      Time of void PreOp Time of Void Prior to Procedure:  (Hartman) (09/06/23 1105)    PostOp      Diapered? No   Bladder Scan     PO    water     Vitals    B/P: 112/71  T: 98.2  F (36.8  C)    Temp src: Axillary  P:  Pulse: 68 (09/06/23 1515)          R: 11  O2:  SpO2: 90 %    O2 Device: None (Room air) (09/06/23 1455)    Oxygen Delivery: 6 LPM (09/06/23 1430)         Family/support present significant other   Patient belongings     Patient transported on cart and air mat   DC meds/scripts (obs/outpt) Not applicable   Inpatient Pain Meds Released? Yes       Special needs/considerations None   Tasks needing completion None       Renny Ruffin RN  ASCOM [id number]

## 2023-09-06 NOTE — BRIEF OP NOTE
[facility]    Brief Operative Note    Pre-operative diagnosis: Urinary retention [R33.9]  Post-operative diagnosis Same as pre-operative diagnosis    Procedure: Procedure(s):  Holmium Laser Enucleation of the Prostate  Surgeon: Surgeon(s) and Role:     * Aris Wooten MD - Primary     * Aishwarya Devries MD - Resident - Assisting  Anesthesia: Choice   Estimated Blood Loss: 50 mL from 9/6/2023 12:07 PM to 9/6/2023  2:30 PM      Drains: 20Fr 3-way Hartman catheter, on CBI, 60cc balloon; bladder trabeculated with large diverticulum at dome     Specimens:   ID Type Source Tests Collected by Time Destination   1 :  Tissue Prostate SURGICAL PATHOLOGY EXAM Aris Wooten MD 9/6/2023  2:10 PM      Findings: trilobar hypertrophy, resected down to capsule.  Complications: None.  Implants: * No implants in log *    Post-op plan:   - admit to urology on CBI  - wean CBI as tolerated  - clamp trial and TOV in AM

## 2023-09-07 VITALS
HEIGHT: 66 IN | BODY MASS INDEX: 27.81 KG/M2 | WEIGHT: 173.06 LBS | RESPIRATION RATE: 18 BRPM | TEMPERATURE: 98.8 F | DIASTOLIC BLOOD PRESSURE: 79 MMHG | SYSTOLIC BLOOD PRESSURE: 146 MMHG | HEART RATE: 74 BPM | OXYGEN SATURATION: 96 %

## 2023-09-07 LAB
ERYTHROCYTE [DISTWIDTH] IN BLOOD BY AUTOMATED COUNT: 12.7 % (ref 10–15)
HCT VFR BLD AUTO: 34.8 % (ref 40–53)
HGB BLD-MCNC: 11.5 G/DL (ref 13.3–17.7)
HOLD SPECIMEN: NORMAL
MCH RBC QN AUTO: 30.7 PG (ref 26.5–33)
MCHC RBC AUTO-ENTMCNC: 33 G/DL (ref 31.5–36.5)
MCV RBC AUTO: 93 FL (ref 78–100)
PLATELET # BLD AUTO: 235 10E3/UL (ref 150–450)
RBC # BLD AUTO: 3.74 10E6/UL (ref 4.4–5.9)
WBC # BLD AUTO: 11.1 10E3/UL (ref 4–11)

## 2023-09-07 PROCEDURE — 250N000011 HC RX IP 250 OP 636: Mod: JZ | Performed by: STUDENT IN AN ORGANIZED HEALTH CARE EDUCATION/TRAINING PROGRAM

## 2023-09-07 PROCEDURE — 85027 COMPLETE CBC AUTOMATED: CPT | Performed by: STUDENT IN AN ORGANIZED HEALTH CARE EDUCATION/TRAINING PROGRAM

## 2023-09-07 PROCEDURE — 250N000009 HC RX 250: Performed by: STUDENT IN AN ORGANIZED HEALTH CARE EDUCATION/TRAINING PROGRAM

## 2023-09-07 PROCEDURE — 36415 COLL VENOUS BLD VENIPUNCTURE: CPT | Performed by: STUDENT IN AN ORGANIZED HEALTH CARE EDUCATION/TRAINING PROGRAM

## 2023-09-07 PROCEDURE — 250N000013 HC RX MED GY IP 250 OP 250 PS 637: Performed by: STUDENT IN AN ORGANIZED HEALTH CARE EDUCATION/TRAINING PROGRAM

## 2023-09-07 RX ORDER — FUROSEMIDE 10 MG/ML
10 INJECTION INTRAMUSCULAR; INTRAVENOUS ONCE
Status: COMPLETED | OUTPATIENT
Start: 2023-09-07 | End: 2023-09-07

## 2023-09-07 RX ORDER — NALOXONE HYDROCHLORIDE 0.4 MG/ML
0.4 INJECTION, SOLUTION INTRAMUSCULAR; INTRAVENOUS; SUBCUTANEOUS
Status: DISCONTINUED | OUTPATIENT
Start: 2023-09-07 | End: 2023-09-07 | Stop reason: HOSPADM

## 2023-09-07 RX ORDER — NALOXONE HYDROCHLORIDE 0.4 MG/ML
0.2 INJECTION, SOLUTION INTRAMUSCULAR; INTRAVENOUS; SUBCUTANEOUS
Status: DISCONTINUED | OUTPATIENT
Start: 2023-09-07 | End: 2023-09-07 | Stop reason: HOSPADM

## 2023-09-07 RX ADMIN — SENNOSIDES AND DOCUSATE SODIUM 1 TABLET: 50; 8.6 TABLET ORAL at 09:30

## 2023-09-07 RX ADMIN — ACETAMINOPHEN 975 MG: 325 TABLET, FILM COATED ORAL at 06:52

## 2023-09-07 RX ADMIN — FUROSEMIDE 10 MG: 10 INJECTION, SOLUTION INTRAMUSCULAR; INTRAVENOUS at 10:13

## 2023-09-07 RX ADMIN — LIDOCAINE: 40 CREAM TOPICAL at 10:40

## 2023-09-07 RX ADMIN — ACETAMINOPHEN 975 MG: 325 TABLET, FILM COATED ORAL at 16:06

## 2023-09-07 RX ADMIN — POLYETHYLENE GLYCOL 3350 17 G: 17 POWDER, FOR SOLUTION ORAL at 09:30

## 2023-09-07 ASSESSMENT — ACTIVITIES OF DAILY LIVING (ADL)
ADLS_ACUITY_SCORE: 31

## 2023-09-07 NOTE — PLAN OF CARE
Goal Outcome Evaluation:         Pt A&Ox4 this shift. Pt denies chest pain, SOB, N/V, N/T. Pt states no pain, gave scheduled Tylenol. Pt mcgill clamped, and removed around 1100. Pt PVR 254mL, 317 mL. Paged Urology. Third  mL. Paged. Pt up and walking hallways.Pt cleared for discharge. Went over paperwork, answered questions, pt left with wife.

## 2023-09-07 NOTE — PROGRESS NOTES
"Urology  Progress Note    NAEO, AFVSS  Pain controlled  Tolerating diet  Ambulating  Tolerating Hartman    Exam  /88   Pulse 76   Temp 98.3  F (36.8  C) (Oral)   Resp 16   Ht 1.676 m (5' 6\")   Wt 78.5 kg (173 lb 1 oz)   SpO2 94%   BMI 27.93 kg/m    No acute distress  Unlabored breathing  Abdomen soft, nontender, nondistended.  Hartman with light pink urine in tubing, CBI at slow rate    UOP CBI    Labs  Recent Labs   Lab Test 08/31/23  1038 08/22/23  0556 08/21/23  1233 08/21/23  0519 08/21/23  0359 08/21/23  0354 06/29/23  1034   WBC  --  7.8  --   --   --  8.8 4.9   HGB 12.5* 10.2* 12.8*   < >  --  15.0 15.2   CR  --  0.86  --   --  1.0 0.94 0.89    < > = values in this interval not displayed.     AM labs pending    Assessment/Plan  68 year old y/o male POD#1 s/p HoLEP for BPH with LUTSx. CBI clamped, patient to ambulate this AM. If urine remains clear, will perform TOV.     Neuro: Tylenol for pain control  CV: HDS  Pulm: incentive spirometry while awake  FEN/GI: regular diet  Endo: SHERI  : clamp trial with ambulation, then TOV  Heme/ID: AM labs pending. Discharge on 7 days Macrobid.   Activity: up ad shadi  PPx: SCDs    Seen and examined with the chief resident. Will discuss with Dr. Wooten.    Aishwarya Devries MD, PGY-3  Urology Resident     Contacting the Urology Team     Please use the following job codes to reach the Urology Team. Note that you must use an in house phone and that job codes cannot receive text pages.   On weekdays, dial [phone] (or star-star-star 777 on the new [employer] telephones) then [phone] to reach the Adult Urology resident or PA on call  On weekdays, dial [phone] (or star-star-star 777 on the new [employer] telephones) then [phone] to reach the Pediatric Urology resident  On weeknights and weekends, dial [phone] (or star-star-star 777 on the new [employer] telephones) then [phone] to reach the Urology resident on call (for both Adult and Pediatrics)        "

## 2023-09-07 NOTE — PLAN OF CARE
Vital signs:  Temp: 98.3  F (36.8  C) Temp src: Oral BP: 138/88 Pulse: 76   Resp: 16 SpO2: 94 % O2 Device: None (Room air)     Neuro/CMS: A&Ox4, VSS, strong strength in all extremities, denies SOB, chest pain, N/V. States he has neuropathy in all extremities- baseline.  Respiratory: Lung sounds clear- equal bilaterally. Incentive spirometer used multiple times this evening. O2 sating ~97%.  Gastro/Genito: Bowel sounds, hyperactive, passing flatus.  Activity: SBA, steady gait, ambulated in room.  Skin/Dressings: Preventative Mepilex in place.  Lines/Drains/Airways: L PIV, SL. Hartman catheter - CBI, urine clear- pink colored. Very few small clots noted.  Pain: Pt declines pain, instead endorses discomfort.   Diet: Reg.- good appetite.  Plan: TOV in morning, continue POC

## 2023-09-07 NOTE — PHARMACY-ADMISSION MEDICATION HISTORY
Pharmacist Admission Medication History    Admission medication history is complete. The information provided in this note is only as accurate as the sources available at the time of the update.    Medication reconciliation/reorder completed by provider prior to medication history? Yes    Information Source(s): Patient via in-person    Pertinent Information:   - Patient confirmed not taking any prescription or OTC medications     Changes made to PTA medication list:  Added: None  Deleted: lipo flavonoid 6 tabs daily --   Changed: None    Medication Affordability:       Allergies reviewed with patient and updates made in EHR: yes    Medication History Completed By: VINCENZO MORRISSEY RPH 9/6/2023 7:54 PM    Prior to Admission medications    Medication Sig Last Dose Taking? Auth Provider Long Term End Date

## 2023-09-07 NOTE — PLAN OF CARE
FOCUS/GOAL       ASSESSMENT, INTERVENTIONS AND CONTINUING PLAN FOR GOAL:     Orientation: A&O x4, makes needs known  Pain: pt denies c/p. Sob, n/v  Transfers: Stand-by assist of 1, ambulated in the hallway  Bowel: Continent, no bowel, passing flatus  Bladder: Continent  Diet/ Liquids: Regular  Other: Possible discharge in the morning, CBI stopped by provider, clear to light pink color, very small clots noted. L PIV saline locked.  call light within reach. Continue with POC.

## 2023-09-08 ENCOUNTER — NURSE TRIAGE (OUTPATIENT)
Dept: UROLOGY | Facility: CLINIC | Age: 68
End: 2023-09-08
Payer: MEDICARE

## 2023-09-08 LAB
PATH REPORT.COMMENTS IMP SPEC: NORMAL
PATH REPORT.COMMENTS IMP SPEC: NORMAL
PATH REPORT.FINAL DX SPEC: NORMAL
PATH REPORT.GROSS SPEC: NORMAL
PATH REPORT.MICROSCOPIC SPEC OTHER STN: NORMAL
PATH REPORT.RELEVANT HX SPEC: NORMAL
PHOTO IMAGE: NORMAL

## 2023-09-08 NOTE — TELEPHONE ENCOUNTER
Caller reporting the following red-flag symptom(s): the patient called stating he is not able to urinate any longer after surgery on 9/6/23    Per the system red-flag symptom policy, patient was instructed to:  speak with a Registered Nurse    Action:  Patient warm transferred to a Registered Nurse

## 2023-09-08 NOTE — TELEPHONE ENCOUNTER
68 year old s/p Holmium Laser Enucleation of the Prostate (HoLEP) on 9/6/23 He had a mcgill placed and it was removed yesterday  Since arriving at home he has had urinating issues  He trickles out urine -  it is not steady strong pressure  He states it has gotten better from yesterday   He states his urine does have some blood in it  -to be expected   He is drinking a good amount of water     Informed him that it is good that his flow is improving He has had some swelling from surgery and removal of catheter and it should be decreasing which should help his flow    He needs to be seen or go to ER doesn't go without urinating for 4 hours or feel like his bladder is distended and uncomfortable.      He has # to call if ?'s arise over the weekend        Reason for Disposition   Other post-op symptom or question    Additional Information   Negative: Sounds like a life-threatening emergency to the triager   Negative: Chest pain   Negative: Difficulty breathing   Negative: Acting confused (e.g., disoriented, slurred speech) or excessively sleepy   Negative: Surgical incision symptoms and questions   Negative: Pain or burning with passing urine (urination) and male   Negative: Pain or burning with passing urine (urination) and female   Negative: Constipation   Negative: New or worsening leg (calf, thigh) pain   Negative: New or worsening leg swelling   Negative: Dizziness is severe, or persists > 24 hours after surgery   Negative: Symptoms arising from use of a urinary catheter (Mcgill or Coude)   Negative: Cast problems or questions   Negative: Medication question   Negative: Bright red, wide-spread, sunburn-like rash   Negative: SEVERE headache and after spinal (epidural) anesthesia   Negative: Vomiting and persists > 4 hours   Negative: Vomiting and abdomen looks much more swollen than usual   Negative: Drinking very little and dehydration suspected (e.g., no urine > 12 hours, very dry mouth, very lightheaded)   Negative:  "Patient sounds very sick or weak to the triager   Negative: Sounds like a serious complication to the triager   Negative: Fever > 100.4 F (38.0 C)   Negative: Caller has URGENT question and triager unable to answer question   Negative: SEVERE post-op pain (e.g., excruciating, pain scale 8-10) that is not controlled with pain medications   Negative: Headache and after spinal (epidural) anesthesia and not severe   Negative: Patient wants to be seen   Negative: MILD TO MODERATE post-op pain (e.g., pain scale 1-7) that is not controlled with pain medications   Negative: Caller has NON-URGENT question and triager unable to answer question   Negative: Fever present > 3 days (72 hours)    Answer Assessment - Initial Assessment Questions  1. SYMPTOM: \"What's the main symptom you're concerned about?\" (e.g., pain, fever, vomiting)      Urine is not coming out with full hard stream  is this normal??   2. ONSET: \"When did issue  start?\"      Since arriving at home  3. SURGERY: \"What surgery did you have?\"      Holmium Laser Enucleation of the Prostate (HoLEP)  4. DATE of SURGERY: \"When was the surgery?\"       9/6/23  5. ANESTHESIA: \" What type of anesthesia did you have?\" (e.g., general, spinal, epidural, local)      gen  6. PAIN: \"Is there any pain?\" If Yes, ask: \"How bad is it?\"  (Scale 1-10; or mild, moderate, severe)      contorolled  7. FEVER: \"Do you have a fever?\" If Yes, ask: \"What is your temperature, how was it measured, and when did it start?\"      no  8. VOMITING: \"Is there any vomiting?\" If Yes, ask: \"How many times?\"      no  9. BLEEDING: \"Is there any bleeding?\" If Yes, ask: \"How much?\" and \"Where?\"      Some blood in urine    10. OTHER SYMPTOMS: \"Do you have any other symptoms?\" (e.g., drainage from wound, painful urination, constipation)        Had constipation but resolved    Protocols used: Post-Op Symptoms and Pufbssocc-L-MP    "

## 2023-09-12 ENCOUNTER — PATIENT OUTREACH (OUTPATIENT)
Dept: UROLOGY | Facility: CLINIC | Age: 68
End: 2023-09-12
Payer: MEDICARE

## 2023-09-12 NOTE — PROGRESS NOTES
RNCC received staff message that patient left message on clinic line with questions about slow stream after Holep on 09/06.  RNCC attempt to call patient and someone picks up but unable to hear them if they are talking.  RNCC to try again another time  Viry LONG

## 2023-09-23 ENCOUNTER — TRANSFERRED RECORDS (OUTPATIENT)
Dept: HEALTH INFORMATION MANAGEMENT | Facility: CLINIC | Age: 68
End: 2023-09-23
Payer: MEDICARE

## 2023-09-28 ENCOUNTER — PRE VISIT (OUTPATIENT)
Dept: GASTROENTEROLOGY | Facility: CLINIC | Age: 68
End: 2023-09-28

## 2023-12-12 ENCOUNTER — OFFICE VISIT (OUTPATIENT)
Dept: UROLOGY | Facility: CLINIC | Age: 68
End: 2023-12-12
Payer: MEDICARE

## 2023-12-12 VITALS
HEART RATE: 68 BPM | WEIGHT: 172 LBS | SYSTOLIC BLOOD PRESSURE: 163 MMHG | DIASTOLIC BLOOD PRESSURE: 87 MMHG | OXYGEN SATURATION: 97 % | BODY MASS INDEX: 27.64 KG/M2 | HEIGHT: 66 IN

## 2023-12-12 DIAGNOSIS — N40.1 BENIGN PROSTATIC HYPERPLASIA WITH URINARY RETENTION: Primary | ICD-10-CM

## 2023-12-12 DIAGNOSIS — R33.8 BENIGN PROSTATIC HYPERPLASIA WITH URINARY RETENTION: Primary | ICD-10-CM

## 2023-12-12 DIAGNOSIS — R30.0 DYSURIA: ICD-10-CM

## 2023-12-12 LAB
ALBUMIN UR-MCNC: ABNORMAL MG/DL
APPEARANCE UR: CLEAR
BILIRUB UR QL STRIP: NEGATIVE
COLOR UR AUTO: YELLOW
GLUCOSE UR STRIP-MCNC: NEGATIVE MG/DL
HGB UR QL STRIP: NEGATIVE
KETONES UR STRIP-MCNC: NEGATIVE MG/DL
LEUKOCYTE ESTERASE UR QL STRIP: NEGATIVE
NITRATE UR QL: NEGATIVE
PH UR STRIP: 7 [PH] (ref 5–7)
PSA SERPL-MCNC: 2.2 UG/L (ref 0–4)
RESIDUAL VOLUME (RV) (EXTERNAL): 306
SP GR UR STRIP: 1.02 (ref 1–1.03)
UROBILINOGEN UR STRIP-ACNC: 0.2 E.U./DL

## 2023-12-12 PROCEDURE — 81003 URINALYSIS AUTO W/O SCOPE: CPT | Mod: QW | Performed by: UROLOGY

## 2023-12-12 PROCEDURE — 99213 OFFICE O/P EST LOW 20 MIN: CPT | Mod: 25 | Performed by: UROLOGY

## 2023-12-12 PROCEDURE — 51798 US URINE CAPACITY MEASURE: CPT | Performed by: UROLOGY

## 2023-12-12 PROCEDURE — 36415 COLL VENOUS BLD VENIPUNCTURE: CPT | Performed by: UROLOGY

## 2023-12-12 PROCEDURE — 87086 URINE CULTURE/COLONY COUNT: CPT | Performed by: UROLOGY

## 2023-12-12 PROCEDURE — 84153 ASSAY OF PSA TOTAL: CPT | Performed by: UROLOGY

## 2023-12-12 ASSESSMENT — PAIN SCALES - GENERAL: PAINLEVEL: NO PAIN (0)

## 2023-12-12 NOTE — PROGRESS NOTES
Assessment & Plan   ASSESSMENT and PLAN  68 year old male here for reevaluation of after HoLEP.  Doing well overall, has some postvoid dribbling and urinary retention although is able to generate a good stream..    1. Benign prostatic hyperplasia with urinary retention status post HoLEP  2.  Urinary retention  3.  Postvoid dribbling  - PSA tumor marker [[id number]]  - MEASURE POST-VOID RESIDUAL URINE/BLADDER CAPACITY, US NON-IMAGING (60814)    Patient overall doing well.  Will recheck his PSA in 1 year given his value being over 2.  Although had negative pathology, will continue to monitor and consider prostate MRI if still above 2.    Will refer to pelvic floor physical therapy due to postvoid dribbling.    Will recheck PVR at next visit.    Plan:  -Pelvic floor physical  -follow-up in 1 year with uroflow, PVR, PSA    Time spent: 15 minutes spent on the date of the encounter doing chart review, history and exam, documentation and further activities as noted above.    Aris Wooten MD   Urology  [facility]         Subjective     CHIEF COMPLAINT   It was my pleasure to see Roni Rebolledo  who is a 68 year old male for follow-up of HoLEP.      HPI   Roni Rebolledo is a very pleasant 68 year old male history of BPH status post a prostate artery embolization in the past.  He had a cystoscopy and clot evacuation in August.  He underwent a HoLEP on September 6, 2023.    His pathology was 60.5 g of benign tissue.    He was admitted overnight and had catheter removal on September 7 (postop day 1) without retention.    Postoperatively, he did have any unplanned visits, ER visits, hospitalizations or unplanned procedures.    Experienced postoperative hematuria for 2 weeks and dysuria for 2 weeks.  Currently, he is experiencing type of incontinence: no incontinence, but having post void dribbling.  His urinary symptoms are improved.  He notes that overnight he does not significantly void however in the  "morning he voids up to 24 ounces with a very good stream.  Throughout the day he only voids a couple times.    AUA ([employer]) SYMPTOM SCORE     AUA symptom score 9  Quality-of-life score mostly satisfied    Office Uroflow  Voided volume: 68 ml  Peak flow: 8 ml/s  Mean flow: 5 ml/s  Post void residual by portable ultrasound: 306 ml    PSA 2.2 (preop was 8.38)       Objective     PHYSICAL EXAM  Patient is a 68 year old  male   Vitals: Blood pressure (!) 163/87, pulse 68, height 1.676 m (5' 6\"), weight 78 kg (172 lb), SpO2 97%.  Body mass index is 27.76 kg/m .  General: No acute distress             "

## 2023-12-12 NOTE — LETTER
12/12/2023       RE: Roni Rebolledo  [address]  [place] [address]     Dear Colleague,    Thank you for referring your patient, Roni Rebolledo, to the [facility] at [facility]. Please see a copy of my visit note below.    Assessment & Plan  ASSESSMENT and PLAN  68 year old male here for reevaluation of after HoLEP.  Doing well overall, has some postvoid dribbling and urinary retention although is able to generate a good stream..    1. Benign prostatic hyperplasia with urinary retention status post HoLEP  2.  Urinary retention  3.  Postvoid dribbling  - PSA tumor marker [[id number]]  - MEASURE POST-VOID RESIDUAL URINE/BLADDER CAPACITY, US NON-IMAGING (10522)    Patient overall doing well.  Will recheck his PSA in 1 year given his value being over 2.  Although had negative pathology, will continue to monitor and consider prostate MRI if still above 2.    Will refer to pelvic floor physical therapy due to postvoid dribbling.    Will recheck PVR at next visit.    Plan:  -Pelvic floor physical  -follow-up in 1 year with uroflow, PVR, PSA    Time spent: 15 minutes spent on the date of the encounter doing chart review, history and exam, documentation and further activities as noted above.    Aris Wooten MD   Urology  [facility]         Subjective    CHIEF COMPLAINT   It was my pleasure to see Roni Rebolledo  who is a 68 year old male for follow-up of HoLEP.      HPI   Roni Rebolledo is a very pleasant 68 year old male history of BPH status post a prostate artery embolization in the past.  He had a cystoscopy and clot evacuation in August.  He underwent a HoLEP on September 6, 2023.    His pathology was 60.5 g of benign tissue.    He was admitted overnight and had catheter removal on September 7 (postop day 1) without retention.    Postoperatively, he did have any unplanned visits, ER visits,  "hospitalizations or unplanned procedures.    Experienced postoperative hematuria for 2 weeks and dysuria for 2 weeks.  Currently, he is experiencing type of incontinence: no incontinence, but having post void dribbling.  His urinary symptoms are improved.  He notes that overnight he does not significantly void however in the morning he voids up to 24 ounces with a very good stream.  Throughout the day he only voids a couple times.    AUA ([employer]) SYMPTOM SCORE     AUA symptom score 9  Quality-of-life score mostly satisfied    Office Uroflow  Voided volume: 68 ml  Peak flow: 8 ml/s  Mean flow: 5 ml/s  Post void residual by portable ultrasound: 306 ml    PSA 2.2 (preop was 8.38)       Objective    PHYSICAL EXAM  Patient is a 68 year old  male   Vitals: Blood pressure (!) 163/87, pulse 68, height 1.676 m (5' 6\"), weight 78 kg (172 lb), SpO2 97%.  Body mass index is 27.76 kg/m .  General: No acute distress       "

## 2023-12-12 NOTE — NURSING NOTE
Chief Complaint   Patient presents with    Urinary Retention    Benign Prostatic Hypertrophy    Patients PVR was 306 ml today.  Beatriz Rivera LPN

## 2023-12-13 LAB — BACTERIA UR CULT: NO GROWTH

## 2023-12-18 ENCOUNTER — THERAPY VISIT (OUTPATIENT)
Dept: PHYSICAL THERAPY | Facility: CLINIC | Age: 68
End: 2023-12-18
Payer: MEDICARE

## 2023-12-18 DIAGNOSIS — M62.89 PELVIC FLOOR DYSFUNCTION: Primary | ICD-10-CM

## 2023-12-18 DIAGNOSIS — N81.89 WEAKNESS OF PELVIC FLOOR: ICD-10-CM

## 2023-12-18 PROCEDURE — 97110 THERAPEUTIC EXERCISES: CPT | Mod: GP

## 2023-12-18 PROCEDURE — 97530 THERAPEUTIC ACTIVITIES: CPT | Mod: GP

## 2023-12-18 PROCEDURE — 97161 PT EVAL LOW COMPLEX 20 MIN: CPT | Mod: GP

## 2023-12-18 NOTE — PROGRESS NOTES
PHYSICAL THERAPY EVALUATION  Type of Visit: Evaluation    See electronic medical record for Abuse and Falls Screening details.    Patient reports ever since HoLEP not feeling back to normal. Notes a long hx of bladder starting and stopping, incomplete emptying. Denies difficulty starting the stream. Notes some spraying or fanning of urine stream. Occ slight discomfort in bladder/urethra w/ urination. Some UUI when holding bladder for too long.  Goals: Learn how to use PFM and do exercises at home. He is leaving to winter in [place] the next 6 mo so wishes to have one visit only and learn HEP.     Subjective       Presenting condition or subjective complaint: Bladder emptying  Date of onset: 09/06/23    Relevant medical history:     Dates & types of surgery:      Prior diagnostic imaging/testing results: MRI     Prior therapy history for the same diagnosis, illness or injury: No      Prior Level of Function  Transfers:   Ambulation:   ADL:   IADL:     Living Environment  Social support: With a significant other or spouse   Type of home: Apartment/condo   Stairs to enter the home: Yes 50 Is there a railing: Yes   Ramp:     Stairs inside the home: Yes 2 Is there a railing: Yes   Help at home: Self Cares (home health aide/personal care attendant, family, etc)  Equipment owned:       Employment:      Hobbies/Interests:      Patient goals for therapy: Learn exercises    Pain assessment:      Objective      PELVIC EVALUATION  ADDITIONAL HISTORY:  Sex assigned at birth: Male  Gender identity: Male    Pronouns:        Bladder History:  Feels bladder filling: Yes  Triggers for feeling of inability to wait to go to the bathroom: No    How long can you wait to urinate: 2-4hrs  Gets up at night to urinate: No    Can stop the flow of urine when urinating: Sometimes  Volume of urine usually released: Medium   Other issues:    Number of bladder infections in last 12 months:    Fluid intake per day:        Medications taken for  bladder: No     Activities causing urine leak:      Amount of urine typically leaked: Drops  Pads used to help with leaking: No        Bowel History:  Frequency of bowel movement:    Consistency of stool:      Ignores the urge to defecate:    Other bowel issues:    Length of time spent trying to have a bowel movement:      Sexual Function History:  Sexual orientation:      Sexually active:    Lubrication used:      Pelvic pain:      Pain or difficulty with orgasms/erection/ejaculation: Yes    State of menopause:    Hormone medications: No           Discussed reason for referral regarding pelvic health needs and external/internal pelvic floor muscle examination with patient/guardian.  Opportunity provided to ask questions and verbal consent for assessment and intervention was given.    PAIN:   POSTURE:   LUMBAR SCREEN: AROM WFL  HIP SCREEN:  Strength: WNL   Functional Strength Testing:     PELVIC/SI SCREEN:     PAIN PROVOCATION TEST:   PELVIS/SI SPECIAL TESTS:   BREATHING SYMMETRY: Decreased rib cage mobility    PELVIC EXAM  External Visual Inspection:  At rest: Normal  With voluntary pelvic floor contraction: Perineal elevation, Present  Relaxation of PFM: Yes    Integumentary:   Anal: WNL    RUSI: Good squeeze and lift transperineal view. Initially uses glutes to compensate and holds breath, able to correct w/ cues. 7 sec endurance    External Digital Palpation per Perineum:   Perineal body: Unremarkable        Assessment & Plan   CLINICAL IMPRESSIONS  Medical Diagnosis: Benign prostatic hyperplasia with urinary retention    Treatment Diagnosis: Pelvic floor dysfunction   Impression/Assessment: Patient is a 68 year old male with Pelvic Floor complaints.  The following significant findings have been identified: Decreased strength, Impaired muscle performance, and Decreased activity tolerance. These impairments interfere with their ability to perform self care tasks, recreational activities, household chores, and  community mobility as compared to previous level of function.     Clinical Decision Making (Complexity):  Clinical Presentation: Stable/Uncomplicated  Clinical Presentation Rationale: based on medical and personal factors listed in PT evaluation  Clinical Decision Making (Complexity): Low complexity    PLAN OF CARE  Treatment Interventions:  Modalities: Ultrasound  Interventions: Neuromuscular Re-education, Therapeutic Activity, Therapeutic Exercise, Self-Care/Home Management    Long Term Goals     PT Goal 1  Goal Identifier: HEP  Goal Description: Pt will demonstrate proper pelvic floor contract-relax as assessed through visual observation and rehabilitative US imaging for independent performance of HEP while [place]of[place] this winter  Target Date: 12/18/23  Date Met: 12/18/23      Frequency of Treatment: 1 time E&T  Duration of Treatment: 1 day    Recommended Referrals to Other Professionals:  none  Education Assessment:   Learner/Method: Patient;Demonstration;Listening;Pictures/Video    Risks and benefits of evaluation/treatment have been explained.   Patient/Family/caregiver agrees with Plan of Care.     Evaluation Time:     PT Eval, Low Complexity Minutes (67170): 20       Signing Clinician: JONATHAN YUNG, PT      [facility]                                                                                   OUTPATIENT PHYSICAL THERAPY      PLAN OF TREATMENT FOR OUTPATIENT REHABILITATION   Patient's Last Name, First Name, Roni Lopez YOB: 1955   Provider's Name   [facility]   Medical Record No.  [id number]     Onset Date: 09/06/23  Start of Care Date: 12/18/23     Medical Diagnosis:  Benign prostatic hyperplasia with urinary retention      PT Treatment Diagnosis:  Pelvic floor dysfunction Plan of Treatment  Frequency/Duration: 1 time E&T/ 1 day    Certification date from 12/18/23 to 12/19/23         See note for plan of  treatment details and functional goals     JONATHAN YUNG, PT                         I CERTIFY THE NEED FOR THESE SERVICES FURNISHED UNDER        THIS PLAN OF TREATMENT AND WHILE UNDER MY CARE .             Physician Signature               Date    X_____________________________________________________                  Referring Provider:  Aris Wooten    Initial Assessment  See Epic Evaluation- Start of Care Date: 12/18/23

## 2023-12-19 PROBLEM — M62.89 PELVIC FLOOR DYSFUNCTION: Status: ACTIVE | Noted: 2023-12-19

## 2023-12-19 PROBLEM — N81.89 WEAKNESS OF PELVIC FLOOR: Status: ACTIVE | Noted: 2023-12-19

## 2024-01-07 ENCOUNTER — HEALTH MAINTENANCE LETTER (OUTPATIENT)
Age: 69
End: 2024-01-07

## 2024-04-19 ENCOUNTER — TRANSFERRED RECORDS (OUTPATIENT)
Dept: HEALTH INFORMATION MANAGEMENT | Facility: CLINIC | Age: 69
End: 2024-04-19
Payer: MEDICARE

## 2024-05-13 ENCOUNTER — PATIENT OUTREACH (OUTPATIENT)
Dept: CARE COORDINATION | Facility: CLINIC | Age: 69
End: 2024-05-13
Payer: MEDICARE

## 2024-06-18 ENCOUNTER — TRANSFERRED RECORDS (OUTPATIENT)
Dept: HEALTH INFORMATION MANAGEMENT | Facility: CLINIC | Age: 69
End: 2024-06-18
Payer: MEDICARE

## 2024-06-24 ENCOUNTER — TRANSFERRED RECORDS (OUTPATIENT)
Dept: HEALTH INFORMATION MANAGEMENT | Facility: CLINIC | Age: 69
End: 2024-06-24
Payer: MEDICARE

## 2024-06-25 ENCOUNTER — OFFICE VISIT (OUTPATIENT)
Dept: FAMILY MEDICINE | Facility: CLINIC | Age: 69
End: 2024-06-25
Payer: MEDICARE

## 2024-06-25 VITALS
BODY MASS INDEX: 28.25 KG/M2 | HEART RATE: 76 BPM | TEMPERATURE: 97.4 F | DIASTOLIC BLOOD PRESSURE: 76 MMHG | RESPIRATION RATE: 18 BRPM | OXYGEN SATURATION: 96 % | WEIGHT: 175 LBS | SYSTOLIC BLOOD PRESSURE: 138 MMHG

## 2024-06-25 DIAGNOSIS — I71.21 ANEURYSM OF ASCENDING AORTA WITHOUT RUPTURE (H): ICD-10-CM

## 2024-06-25 DIAGNOSIS — Z13.6 CARDIOVASCULAR SCREENING; LDL GOAL LESS THAN 130: ICD-10-CM

## 2024-06-25 DIAGNOSIS — M21.611 BUNION, RIGHT: ICD-10-CM

## 2024-06-25 DIAGNOSIS — Z51.81 MEDICATION MONITORING ENCOUNTER: ICD-10-CM

## 2024-06-25 DIAGNOSIS — Z01.818 PREOPERATIVE EXAMINATION: Primary | ICD-10-CM

## 2024-06-25 DIAGNOSIS — M21.612 BUNION, LEFT: ICD-10-CM

## 2024-06-25 DIAGNOSIS — N13.8 BPH WITH OBSTRUCTION/LOWER URINARY TRACT SYMPTOMS: ICD-10-CM

## 2024-06-25 DIAGNOSIS — F41.9 ANXIETY: ICD-10-CM

## 2024-06-25 DIAGNOSIS — N40.1 BENIGN PROSTATIC HYPERPLASIA WITH URINARY RETENTION: ICD-10-CM

## 2024-06-25 DIAGNOSIS — I10 HYPERTENSION GOAL BP (BLOOD PRESSURE) < 140/90: ICD-10-CM

## 2024-06-25 DIAGNOSIS — N40.1 BPH WITH OBSTRUCTION/LOWER URINARY TRACT SYMPTOMS: ICD-10-CM

## 2024-06-25 DIAGNOSIS — R33.8 BENIGN PROSTATIC HYPERPLASIA WITH URINARY RETENTION: ICD-10-CM

## 2024-06-25 DIAGNOSIS — N32.3 BLADDER DIVERTICULUM: ICD-10-CM

## 2024-06-25 LAB
ALBUMIN UR-MCNC: NEGATIVE MG/DL
APPEARANCE UR: CLEAR
BILIRUB UR QL STRIP: NEGATIVE
COLOR UR AUTO: YELLOW
ERYTHROCYTE [DISTWIDTH] IN BLOOD BY AUTOMATED COUNT: 12.5 % (ref 10–15)
GLUCOSE UR STRIP-MCNC: NEGATIVE MG/DL
HCT VFR BLD AUTO: 42.3 % (ref 40–53)
HGB BLD-MCNC: 14.5 G/DL (ref 13.3–17.7)
HGB UR QL STRIP: NEGATIVE
KETONES UR STRIP-MCNC: NEGATIVE MG/DL
LEUKOCYTE ESTERASE UR QL STRIP: NEGATIVE
MCH RBC QN AUTO: 31 PG (ref 26.5–33)
MCHC RBC AUTO-ENTMCNC: 34.3 G/DL (ref 31.5–36.5)
MCV RBC AUTO: 90 FL (ref 78–100)
NITRATE UR QL: NEGATIVE
PH UR STRIP: 7 [PH] (ref 5–7)
PLATELET # BLD AUTO: 195 10E3/UL (ref 150–450)
RBC # BLD AUTO: 4.68 10E6/UL (ref 4.4–5.9)
SP GR UR STRIP: 1.01 (ref 1–1.03)
UROBILINOGEN UR STRIP-ACNC: 0.2 E.U./DL
WBC # BLD AUTO: 5.3 10E3/UL (ref 4–11)

## 2024-06-25 PROCEDURE — 80053 COMPREHEN METABOLIC PANEL: CPT | Performed by: FAMILY MEDICINE

## 2024-06-25 PROCEDURE — 81003 URINALYSIS AUTO W/O SCOPE: CPT | Performed by: FAMILY MEDICINE

## 2024-06-25 PROCEDURE — 36415 COLL VENOUS BLD VENIPUNCTURE: CPT | Performed by: FAMILY MEDICINE

## 2024-06-25 PROCEDURE — 99214 OFFICE O/P EST MOD 30 MIN: CPT | Performed by: FAMILY MEDICINE

## 2024-06-25 PROCEDURE — G2211 COMPLEX E/M VISIT ADD ON: HCPCS | Performed by: FAMILY MEDICINE

## 2024-06-25 PROCEDURE — 93000 ELECTROCARDIOGRAM COMPLETE: CPT | Performed by: FAMILY MEDICINE

## 2024-06-25 PROCEDURE — 85027 COMPLETE CBC AUTOMATED: CPT | Performed by: FAMILY MEDICINE

## 2024-06-25 NOTE — PROGRESS NOTES
Preoperative Evaluation  [address] [address]  Phone: [phone]  Primary Provider: Bernard Ortega MD  Pre-op Performing Provider: Bernard Ortega MD  Jun 25, 2024 6/25/2024   Surgical Information   What procedure is being done? Preop surgery   Facility or Hospital where procedure/surgery will be performed: [facility]   Who is doing the procedure / surgery? Dr Hernandez   Date of surgery / procedure: July 17   Time of surgery / procedure: TBD   Where do you plan to recover after surgery? at home with family      Fax number for surgical facility: pending    Assessment & Plan     The proposed surgical procedure is considered INTERMEDIATE risk.    Preoperative examination    - Comprehensive metabolic panel  - CBC with platelets  - UA Macroscopic with reflex to Microscopic and Culture  - EKG 12-lead complete w/read - Clinics  - Comprehensive metabolic panel  - CBC with platelets  - UA Macroscopic with reflex to Microscopic and Culture    Bunion, left      Bunion, right      Hypertension goal BP (blood pressure) < 140/90    - Comprehensive metabolic panel  - UA Macroscopic with reflex to Microscopic and Culture  - EKG 12-lead complete w/read - Clinics  - Comprehensive metabolic panel  - UA Macroscopic with reflex to Microscopic and Culture    Aneurysm of ascending aorta without rupture (H24)    - Comprehensive metabolic panel  - UA Macroscopic with reflex to Microscopic and Culture  - EKG 12-lead complete w/read - Clinics  - Comprehensive metabolic panel  - UA Macroscopic with reflex to Microscopic and Culture    Anxiety      Benign prostatic hyperplasia with urinary retention    - UA Macroscopic with reflex to Microscopic and Culture  - UA Macroscopic with reflex to Microscopic and Culture    BPH with obstruction/lower urinary tract symptoms    - UA Macroscopic with reflex to Microscopic and Culture  - UA Macroscopic with reflex to Microscopic and  Culture    Bladder diverticulum    - UA Macroscopic with reflex to Microscopic and Culture  - UA Macroscopic with reflex to Microscopic and Culture    CARDIOVASCULAR SCREENING; LDL GOAL LESS THAN 130    - Comprehensive metabolic panel  - Comprehensive metabolic panel    Medication monitoring encounter    - Comprehensive metabolic panel  - CBC with platelets  - UA Macroscopic with reflex to Microscopic and Culture  - EKG 12-lead complete w/read - Clinics  - Comprehensive metabolic panel  - CBC with platelets  - UA Macroscopic with reflex to Microscopic and Culture    Risks and Recommendations  The patient has the following additional risks and recommendations for perioperative complications:   - No identified additional risk factors other than previously addressed    Antiplatelet or Anticoagulation Medication Instructions    No NSAID's, Vitamins, or supplements 5-7 weeks prior    Additional Medication Instructions  Patient is on no additional chronic medications    Recommendation  Approval given to proceed with proposed procedure, without further diagnostic evaluation.    Benjamín Tamayo is a 69 year old, presenting for the followin/25/2024     2:44 PM   Additional Questions   Roomed by Ebony ANTHONY related to upcoming procedure:     Bilateral foot pain - increasing with bunion / pes planus        2024   Pre-Op Questionnaire   Have you ever had a heart attack or stroke? No   Have you ever had surgery on your heart or blood vessels, such as a stent placement, a coronary artery bypass, or surgery on an artery in your head, neck, heart, or legs? No   Do you have chest pain with activity? No   Do you have a history of heart failure? No   Do you currently have a cold, bronchitis or symptoms of other infection? No   Do you have a cough, shortness of breath, or wheezing? No   Do you or anyone in your family have previous history of blood clots? No   Do you or does anyone in your family have a serious  bleeding problem such as prolonged bleeding following surgeries or cuts? No   Have you ever had problems with anemia or been told to take iron pills? No   Have you had any abnormal blood loss such as black, tarry or bloody stools? No   Have you ever had a blood transfusion? No   Are you willing to have a blood transfusion if it is medically needed before, during, or after your surgery? Yes   Have you or any of your relatives ever had problems with anesthesia? No   Do you have sleep apnea, excessive snoring or daytime drowsiness? No   Do you have any artifical heart valves or other implanted medical devices like a pacemaker, defibrillator, or continuous glucose monitor? No   Do you have artificial joints? No   Are you allergic to latex? No      Health Care Directive  Patient does not have a Health Care Directive or Living Will: pending    Preoperative Review of    reviewed - no record of controlled substances prescribed.        Htn / Ascending Aortic dilatation - at home 120's / 70's    BP Readings from Last 3 Encounters:   06/25/24 138/76   12/12/23 (!) 163/87   09/07/23 (!) 146/79     Creatinine   Date Value Ref Range Status   08/22/2023 0.86 0.67 - 1.17 mg/dL Final   05/10/2021 0.92 0.66 - 1.25 mg/dL Final     GFR Estimate   Date Value Ref Range Status   08/22/2023 >90 >60 mL/min/1.73m2 Final   05/10/2021 85 >60 mL/min/[1.73_m2] Final     Comment:     Non  GFR Calc  Starting 12/18/2018, serum creatinine based estimated GFR (eGFR) will be   calculated using the Chronic Kidney Disease Epidemiology Collaboration   (CKD-EPI) equation.       GFR, ESTIMATED POCT   Date Value Ref Range Status   08/21/2023 >60 >60 mL/min/1.73m2 Final     BPH    PSA   Date Value Ref Range Status   06/04/2021 14.60 (H) 0 - 4 ug/L Final     Comment:     Assay Method:  Chemiluminescence using Siemens Vista analyzer     Prostate Specific Antigen Screen   Date Value Ref Range Status   11/09/2022 8.38 (H) 0.00 - 4.00 ug/L  Final     PSA Tumor Marker   Date Value Ref Range Status   12/12/2023 2.20 0.00 - 4.00 ug/L Final     Patient Active Problem List    Diagnosis Date Noted    Weakness of pelvic floor 12/19/2023     Priority: Medium    Pelvic floor dysfunction 12/19/2023     Priority: Medium    BPH with obstruction/lower urinary tract symptoms 09/06/2023     Priority: Medium    Gross hematuria 08/22/2023     Priority: Medium    Urinary retention 08/21/2023     Priority: Medium    Hematuria 08/21/2023     Priority: Medium    Bladder diverticulum 08/21/2023     Priority: Medium    Lipoma 06/2023     Priority: Medium     retroperitoneal - 8.4 cm      Liver mass 06/2023     Priority: Medium     2.4 cm      Benign prostatic hyperplasia with urinary retention      Priority: Medium    Overweight 05/17/2016     Priority: Medium    Cervical radiculopathy 05/17/2016     Priority: Medium    Lumbar radiculopathy 05/17/2016     Priority: Medium    Carpal tunnel syndrome of left wrist 05/17/2016     Priority: Medium    Bunion, left 05/17/2016     Priority: Medium    Bunion, right 05/17/2016     Priority: Medium    Onychomycosis 05/17/2016     Priority: Medium    Elevated prostate specific antigen (PSA) 05/06/2015     Priority: Medium    Flank pain 05/06/2015     Priority: Medium    Immunization deficiency 05/06/2015     Priority: Medium    Aneurysm of ascending aorta without rupture (H24)      Priority: Medium    Hypertension goal BP (blood pressure) < 140/90 12/23/2013     Priority: Medium    CARDIOVASCULAR SCREENING; LDL GOAL LESS THAN 130 10/31/2010     Priority: Medium    Anxiety 12/23/2009     Priority: Medium      Past Medical History:   Diagnosis Date    Anxiety 12/23/2009    Ascending aortic aneurysm 05/06/2015    Benign prostatic hyperplasia     increased PSA    Bladder diverticulum     Dr Duke    Carpal tunnel syndrome on both sides     Hypertension 12/23/2013    Immunization deficiency 05/06/2015    Lipoma 06/2023    retroperitoneal -  8.4 cm    Liver mass 2023    2.4 cm    Lumbar radiculopathy 2016    Onychomycosis 2016     Past Surgical History:   Procedure Laterality Date    ARTHROSCOPY KNEE WITH LATERAL MENISCECTOMY      left    ARTHROSCOPY SHOULDER      bilateral - rotator cuff repair -     COLONOSCOPY N/A 2015    Procedure: COLONOSCOPY;  Surgeon: Mariano Vigil MD;  Location:  GI    COLONOSCOPY  2019    tubular adenoma - due 5 yrs    CYSTOSCOPY, FULGURATE BLEEDERS, EVACUATE CLOT(S), COMBINED N/A 2023    Procedure: 1. Cystourethroscopy 2. Clot evacuation 3. Fulguration of bleeder;  Surgeon: Hilario Duke MD;  Location:  OR    LASER HOLMIUM ENUCLEATION PROSTATE N/A 2023    Procedure: Holmium Laser Enucleation of the Prostate;  Surgeon: Aris Wooten MD;  Location:  OR    ORTHOPEDIC SURGERY      ACL repair left    PROSTATE BIOPSY, NEEDLE, SATURATION SAMPLING      SURGICAL HISTORY OF -       anal fissure    SURGICAL HISTORY OF -   2021    Dr Rojas, prostate artery embolization     No current outpatient medications on file.       No Known Allergies     Social History     Tobacco Use    Smoking status: Former     Current packs/day: 0.00     Average packs/day: 0.3 packs/day for 7.0 years (1.8 ttl pk-yrs)     Types: Cigarettes     Start date: 1/3/1973     Quit date: 1/3/1980     Years since quittin.5    Smokeless tobacco: Never   Substance Use Topics    Alcohol use: Not Currently     Comment: 4 oz of alcohol a day     Family History   Problem Relation Age of Onset    Other Cancer Mother         brain CA    Dementia Father         dementia    Coronary Artery Disease Brother     Brain Cancer Daughter          at age 2    Diabetes Paternal Aunt     Colon Cancer No family hx of     Anesthesia Reaction No family hx of     Venous thrombosis No family hx of      History   Drug Use    Types: Marijuana     Comment: Medical marijuana daily       Review of  "Systems  CONSTITUTIONAL: NEGATIVE for fever, chills, change in weight  INTEGUMENTARY/SKIN: NEGATIVE for worrisome rashes, moles or lesions  EYES: NEGATIVE for vision changes or irritation  ENT/MOUTH: NEGATIVE for ear, mouth and throat problems  RESP: NEGATIVE for significant cough or SOB  CV: NEGATIVE for chest pain, palpitations or peripheral edema  GI: NEGATIVE for nausea, abdominal pain, heartburn, or change in bowel habits  : NEGATIVE for frequency, dysuria, or hematuria  MUSCULOSKELETAL: NEGATIVE for significant arthralgias or myalgia  NEURO: NEGATIVE for weakness, dizziness or paresthesias  ENDOCRINE: NEGATIVE for temperature intolerance, skin/hair changes  HEME: NEGATIVE for bleeding problems  PSYCHIATRIC: NEGATIVE for changes in mood or affect    Objective    /76   Pulse 76   Temp 97.4  F (36.3  C) (Temporal)   Resp 18   Wt 79.4 kg (175 lb)   SpO2 96%   BMI 28.25 kg/m     Estimated body mass index is 28.25 kg/m  as calculated from the following:    Height as of 12/12/23: 1.676 m (5' 6\").    Weight as of this encounter: 79.4 kg (175 lb).    Physical Exam  GENERAL: alert and no distress  EYES: Eyes grossly normal to inspection, PERRL and conjunctivae and sclerae normal  HENT: ear canals and TM's normal, nose and mouth without ulcers or lesions  NECK: no adenopathy, no asymmetry, masses, or scars  RESP: lungs clear to auscultation - no rales, rhonchi or wheezes  CV: regular rate and rhythm, normal S1 S2, no S3 or S4, no murmur, click or rub, no peripheral edema  ABDOMEN: soft, nontender, no hepatosplenomegaly, no masses and bowel sounds normal  MS: no gross musculoskeletal defects noted, no edema  SKIN: no suspicious lesions or rashes  NEURO: Normal strength and tone, mentation intact and speech normal  PSYCH: mentation appears normal, affect normal/bright    Recent Labs   Lab Test 09/07/23  0718 08/31/23  1038 08/22/23  0556 08/21/23  0519 08/21/23  0359 08/21/23  0354   HGB 11.5* 12.5* 10.2*   < " >  --  15.0     --  211  --   --  265   NA  --   --  139  --   --  139   POTASSIUM  --   --  4.4  --   --  4.3   CR  --   --  0.86  --  1.0 0.94    < > = values in this interval not displayed.        Diagnostics  Labs pending at this time.  Results will be reviewed when available.   EKG: appears normal, NSR, normal axis, normal intervals, no acute ST/T changes c/w ischemia, no LVH by voltage criteria    Revised Cardiac Risk Index (RCRI)  The patient has the following serious cardiovascular risks for perioperative complications:     - No serious cardiac risks = 0 points     RCRI Interpretation: 0 points: Class I (very low risk - 0.4% complication rate)    Signed Electronically by:              Bernard Ortega MD, FAAFP     [facility]  [address] [place] [id number]  [email or web address]   Office: [phone]  Fax: [phone]       Copy of this evaluation report is provided to requesting physician.

## 2024-06-26 LAB
ALBUMIN SERPL BCG-MCNC: 4.3 G/DL (ref 3.5–5.2)
ALP SERPL-CCNC: 61 U/L (ref 40–150)
ALT SERPL W P-5'-P-CCNC: 17 U/L (ref 0–70)
ANION GAP SERPL CALCULATED.3IONS-SCNC: 10 MMOL/L (ref 7–15)
AST SERPL W P-5'-P-CCNC: 25 U/L (ref 0–45)
BILIRUB SERPL-MCNC: 0.4 MG/DL
BUN SERPL-MCNC: 18.1 MG/DL (ref 8–23)
CALCIUM SERPL-MCNC: 9.1 MG/DL (ref 8.8–10.2)
CHLORIDE SERPL-SCNC: 107 MMOL/L (ref 98–107)
CREAT SERPL-MCNC: 1.25 MG/DL (ref 0.67–1.17)
DEPRECATED HCO3 PLAS-SCNC: 24 MMOL/L (ref 22–29)
EGFRCR SERPLBLD CKD-EPI 2021: 62 ML/MIN/1.73M2
GLUCOSE SERPL-MCNC: 105 MG/DL (ref 70–99)
POTASSIUM SERPL-SCNC: 4.4 MMOL/L (ref 3.4–5.3)
PROT SERPL-MCNC: 6.6 G/DL (ref 6.4–8.3)
SODIUM SERPL-SCNC: 141 MMOL/L (ref 135–145)

## 2024-07-07 DIAGNOSIS — N28.9 DECREASED RENAL FUNCTION: Primary | ICD-10-CM

## 2024-07-07 DIAGNOSIS — R73.09 ABNORMAL GLUCOSE: ICD-10-CM

## 2024-07-09 ENCOUNTER — TRANSFERRED RECORDS (OUTPATIENT)
Dept: HEALTH INFORMATION MANAGEMENT | Facility: CLINIC | Age: 69
End: 2024-07-09
Payer: MEDICARE

## 2024-07-25 ENCOUNTER — TRANSFERRED RECORDS (OUTPATIENT)
Dept: HEALTH INFORMATION MANAGEMENT | Facility: CLINIC | Age: 69
End: 2024-07-25
Payer: MEDICARE

## 2024-07-30 ENCOUNTER — TRANSFERRED RECORDS (OUTPATIENT)
Dept: HEALTH INFORMATION MANAGEMENT | Facility: CLINIC | Age: 69
End: 2024-07-30
Payer: MEDICARE

## 2024-08-19 ENCOUNTER — TRANSFERRED RECORDS (OUTPATIENT)
Dept: HEALTH INFORMATION MANAGEMENT | Facility: CLINIC | Age: 69
End: 2024-08-19
Payer: MEDICARE

## 2024-08-27 ENCOUNTER — TRANSFERRED RECORDS (OUTPATIENT)
Dept: HEALTH INFORMATION MANAGEMENT | Facility: CLINIC | Age: 69
End: 2024-08-27
Payer: MEDICARE

## 2024-12-17 ENCOUNTER — OFFICE VISIT (OUTPATIENT)
Dept: UROLOGY | Facility: CLINIC | Age: 69
End: 2024-12-17
Payer: MEDICARE

## 2024-12-17 VITALS
OXYGEN SATURATION: 95 % | WEIGHT: 170 LBS | DIASTOLIC BLOOD PRESSURE: 90 MMHG | HEART RATE: 81 BPM | SYSTOLIC BLOOD PRESSURE: 183 MMHG | HEIGHT: 67 IN | BODY MASS INDEX: 26.68 KG/M2

## 2024-12-17 DIAGNOSIS — N40.1 BENIGN PROSTATIC HYPERPLASIA WITH URINARY RETENTION: Primary | ICD-10-CM

## 2024-12-17 DIAGNOSIS — R39.9 UTI SYMPTOMS: ICD-10-CM

## 2024-12-17 DIAGNOSIS — R33.8 BENIGN PROSTATIC HYPERPLASIA WITH URINARY RETENTION: Primary | ICD-10-CM

## 2024-12-17 LAB
ALBUMIN UR-MCNC: NEGATIVE MG/DL
APPEARANCE UR: CLEAR
BILIRUB UR QL STRIP: NEGATIVE
COLOR UR AUTO: YELLOW
GLUCOSE UR STRIP-MCNC: NEGATIVE MG/DL
HGB UR QL STRIP: NEGATIVE
KETONES UR STRIP-MCNC: NEGATIVE MG/DL
LEUKOCYTE ESTERASE UR QL STRIP: NEGATIVE
NITRATE UR QL: NEGATIVE
PH UR STRIP: 7 [PH] (ref 5–7)
PSA SERPL-MCNC: 2.2 UG/L (ref 0–4)
RESIDUAL VOLUME (RV) (EXTERNAL): 34
SP GR UR STRIP: 1.01 (ref 1–1.03)
UROBILINOGEN UR STRIP-ACNC: 0.2 E.U./DL

## 2024-12-17 PROCEDURE — 36415 COLL VENOUS BLD VENIPUNCTURE: CPT | Performed by: UROLOGY

## 2024-12-17 PROCEDURE — 81003 URINALYSIS AUTO W/O SCOPE: CPT | Mod: QW | Performed by: UROLOGY

## 2024-12-17 PROCEDURE — 84153 ASSAY OF PSA TOTAL: CPT | Performed by: UROLOGY

## 2024-12-17 ASSESSMENT — PAIN SCALES - GENERAL: PAINLEVEL_OUTOF10: NO PAIN (0)

## 2024-12-17 NOTE — LETTER
12/17/2024       RE: Roni Rebolledo  [address]  [place] [address]     Dear Colleague,    Thank you for referring your patient, Roni Rebolledo, to the [facility]. Please see a copy of my visit note below.    Assessment & Plan  ASSESSMENT and PLAN  69 year old male here for reevaluation of urinary symptoms after HoLEP.  He is doing well overall.  PSA is stable at 2.2.    1. Benign prostatic hyperplasia with urinary retention (Primary), resolved  2. Prostate ca screening  3. UTI symptoms    Patient's urine dipstick analysis was negative today for infection.    Discussed etiology of retrograde ejaculation.    Counseled that although his PSA is above 2, could be due to residual BPH tissue given the discrepancy between his preoperative prostate volume and intraoperative tissue specimen.  Given stability of PSA, little concerns of causation in the future.  The chance of retreatment may be slightly higher in the future, but still should be quite low.    Will have him follow-up as needed in the future.    Time spent: 15 minutes spent on the date of the encounter doing chart review, history and exam, documentation and further activities as noted above.    Aris Wooten MD   Urology  [facility]         Subjective    CHIEF COMPLAINT   follow-up of HoLEP.      HPI   Roni Rebolledo is a very pleasant 69 year old male who presents with a history of BPH status post a prostate artery embolization in the past.  He had a cystoscopy and clot evacuation in August 2023.  He underwent a HoLEP on September 6, 2023.  His pathology was 60.5 g of benign tissue.      Since last visit, did PFPT for post void dribbling, better now.  Little antegrade ejaculation.  Not too bothersome since not sexually active.  Some ED, but not interested in therapy as not sexually active.     Has peripheral neuropathy.   "Bladder volumes can be as high as 30 ounces when doesn't urinate overnight. Does double/triple void sometimes, thinks could be related to bladder diverticula.    AUA symptom score 5  Quality life score pleased     PVR 34 ml    Labs:   Latest Reference Range & Units 11/09/22 07:57 12/12/23 10:22 12/17/24 14:45   PSA 0.00 - 4.00 ug/L 8.38 (H) 2.20 2.20   (H): Data is abnormally high     Latest Reference Range & Units 12/17/24 15:04   Color Urine Colorless, Straw, Light Yellow, Yellow  Yellow   Appearance Urine Clear  Clear   Glucose Urine Negative mg/dL Negative   Bilirubin Urine Negative  Negative   Ketones Urine Negative mg/dL Negative   Specific Gravity Urine 1.003 - 1.035  1.015   pH Urine 5.0 - 7.0  7.0   Protein Albumin Urine Negative mg/dL Negative   Urobilinogen Urine 0.2, 1.0 E.U./dL 0.2   Nitrite Urine Negative  Negative   Blood Urine Negative  Negative   Leukocyte Esterase Urine Negative  Negative        Objective    PHYSICAL EXAM  Patient is a 69 year old  male   Vitals: Blood pressure (!) 183/90, pulse 81, height 1.702 m (5' 7\"), weight 77.1 kg (170 lb), SpO2 95%.  Body mass index is 26.63 kg/m .  General: No acute distress           Again, thank you for allowing me to participate in the care of your patient.      Sincerely,    Aris Wooten MD    "

## 2024-12-17 NOTE — NURSING NOTE
Chief Complaint   Patient presents with    Benign Prostatic Hypertrophy    Patients post void residual was 34 ml today.  Beatriz Rivera LPN

## 2024-12-17 NOTE — PROGRESS NOTES
Assessment & Plan   ASSESSMENT and PLAN  69 year old male here for reevaluation of urinary symptoms after HoLEP.  He is doing well overall.  PSA is stable at 2.2.    1. Benign prostatic hyperplasia with urinary retention (Primary), resolved  2. Prostate ca screening  3. UTI symptoms    Patient's urine dipstick analysis was negative today for infection.    Discussed etiology of retrograde ejaculation.    Counseled that although his PSA is above 2, could be due to residual BPH tissue given the discrepancy between his preoperative prostate volume and intraoperative tissue specimen.  Given stability of PSA, little concerns of causation in the future.  The chance of retreatment may be slightly higher in the future, but still should be quite low.    Will have him follow-up as needed in the future.    Time spent: 15 minutes spent on the date of the encounter doing chart review, history and exam, documentation and further activities as noted above.    Aris Wooten MD   Urology  [facility]         Subjective     CHIEF COMPLAINT   follow-up of HoLEP.      HPI   Roni Rebolledo is a very pleasant 69 year old male who presents with a history of BPH status post a prostate artery embolization in the past.  He had a cystoscopy and clot evacuation in August 2023.  He underwent a HoLEP on September 6, 2023.  His pathology was 60.5 g of benign tissue.      Since last visit, did PFPT for post void dribbling, better now.  Little antegrade ejaculation.  Not too bothersome since not sexually active.  Some ED, but not interested in therapy as not sexually active.     Has peripheral neuropathy.  Bladder volumes can be as high as 30 ounces when doesn't urinate overnight. Does double/triple void sometimes, thinks could be related to bladder diverticula.    AUA symptom score 5  Quality life score pleased     PVR 34 ml    Labs:   Latest Reference Range & Units 11/09/22 07:57 12/12/23 10:22 12/17/24 14:45   PSA 0.00 -  "4.00 ug/L 8.38 (H) 2.20 2.20   (H): Data is abnormally high     Latest Reference Range & Units 12/17/24 15:04   Color Urine Colorless, Straw, Light Yellow, Yellow  Yellow   Appearance Urine Clear  Clear   Glucose Urine Negative mg/dL Negative   Bilirubin Urine Negative  Negative   Ketones Urine Negative mg/dL Negative   Specific Gravity Urine 1.003 - 1.035  1.015   pH Urine 5.0 - 7.0  7.0   Protein Albumin Urine Negative mg/dL Negative   Urobilinogen Urine 0.2, 1.0 E.U./dL 0.2   Nitrite Urine Negative  Negative   Blood Urine Negative  Negative   Leukocyte Esterase Urine Negative  Negative        Objective     PHYSICAL EXAM  Patient is a 69 year old  male   Vitals: Blood pressure (!) 183/90, pulse 81, height 1.702 m (5' 7\"), weight 77.1 kg (170 lb), SpO2 95%.  Body mass index is 26.63 kg/m .  General: No acute distress         "

## 2025-01-25 ENCOUNTER — HEALTH MAINTENANCE LETTER (OUTPATIENT)
Age: 70
End: 2025-01-25

## (undated) DEVICE — BAG URINARY DRAIN 4000ML LF 153509

## (undated) DEVICE — PACK CYSTO CUSTOM RIDGES

## (undated) DEVICE — PREP SCRUB SOL EXIDINE 4% CHG 4OZ 29002-404

## (undated) DEVICE — LINEN GOWN X4 5410

## (undated) DEVICE — SYR 50ML CATH TIP W/O NDL 309620

## (undated) DEVICE — DRAPE MAYO STAND 23X54 8337

## (undated) DEVICE — BLADE MORCELLATOR WOLF PIRANHA 4.75X385MM 49700103

## (undated) DEVICE — CATH LASER URETERAL 7.1FRX40CM G17797  022403-7.1-40

## (undated) DEVICE — TUBING IRRIG TUR Y TYPE 96" LF 6543-01

## (undated) DEVICE — GLOVE BIOGEL PI MICRO SZ 7.0 48570

## (undated) DEVICE — TUBING SUCTION MEDI-VAC 1/4"X20' N620A

## (undated) DEVICE — LINEN HALF SHEET 5512

## (undated) DEVICE — ESU GROUND PAD ADULT W/CORD E7507

## (undated) DEVICE — DEVICE CATH STABILIZATION STATLOCK FOLEY 3-WAY FOL0105

## (undated) DEVICE — GLOVE BIOGEL PI ULTRATOUCH SZ 7.5 41175

## (undated) DEVICE — SOL NACL 0.9% IRRIG 1000ML BOTTLE 2F7124

## (undated) DEVICE — SYR ELLIK EVACUATOR W/ADAPT LATEX

## (undated) DEVICE — FILTER PIRANHA DISP 2228.901

## (undated) DEVICE — PAD FLOOR SURGSAFE 30X40" 83030

## (undated) DEVICE — SOL WATER IRRIG 3000ML BAG 2B7117

## (undated) DEVICE — LASER FIBER HOLMIUM MOSES 550 D/F/L AC-10030120

## (undated) DEVICE — CATH FOLEY 3WAY 22FR 30ML LATEX 0167SI22

## (undated) DEVICE — LINEN FULL SHEET 5511

## (undated) DEVICE — TUBING SET PIRANHA 41702208

## (undated) DEVICE — Device

## (undated) DEVICE — DRSG ABDOMINAL 07 1/2X8" 7197D

## (undated) DEVICE — SOL NACL 0.9% IRRIG 3000ML BAG 2B7477

## (undated) DEVICE — ESU ELEC ROLLERBALL 24FR 3MM  27050N

## (undated) DEVICE — LINEN TOWEL PACK X5 5464

## (undated) DEVICE — BASIN SET MINOR DISP

## (undated) DEVICE — DRSG GAUZE 4X8" NON21842

## (undated) DEVICE — BAG CLEAR TRASH 1.3M 39X33" P4040C

## (undated) DEVICE — CATH FOLEY 3WAY 24FR 30ML LUBRICATH LATEX 0167L24

## (undated) DEVICE — CATH FOLEY 20FR 5ML LUBRICATH LATEX 0165L20

## (undated) DEVICE — TAPE DURAPORE 3" SILK 1538-3

## (undated) DEVICE — SUCTION MANIFOLD NEPTUNE 2 SYS 4 PORT 0702-020-000

## (undated) DEVICE — TUBING SET THERMEDX UROLOGY SGL USE LL0006

## (undated) DEVICE — DEVICE CATH STABILIZATION STATLOCK FOLEY 2-WAY FOL0102

## (undated) DEVICE — SPECIMEN TRAP TISSUE CONTAINER PIRANHA 2208120

## (undated) DEVICE — SOL WATER IRRIG 1000ML BOTTLE 2F7114

## (undated) DEVICE — SYR 50ML LL W/O NDL 309653

## (undated) DEVICE — STRAP KNEE/BODY 31143004

## (undated) DEVICE — PREP DYNA-HEX 4% CHG SCRUB 4OZ BOTTLE MDS098710

## (undated) DEVICE — SYR PISTON IRRIGATION 60 ML DYND20325

## (undated) DEVICE — PAD CHUX UNDERPAD 30X36" P3036C

## (undated) RX ORDER — LIDOCAINE HYDROCHLORIDE 10 MG/ML
INJECTION, SOLUTION EPIDURAL; INFILTRATION; INTRACAUDAL; PERINEURAL
Status: DISPENSED
Start: 2023-08-21

## (undated) RX ORDER — CEFAZOLIN SODIUM/WATER 2 G/20 ML
SYRINGE (ML) INTRAVENOUS
Status: DISPENSED
Start: 2023-08-21

## (undated) RX ORDER — DEXAMETHASONE SODIUM PHOSPHATE 4 MG/ML
INJECTION, SOLUTION INTRA-ARTICULAR; INTRALESIONAL; INTRAMUSCULAR; INTRAVENOUS; SOFT TISSUE
Status: DISPENSED
Start: 2023-09-06

## (undated) RX ORDER — FENTANYL CITRATE 0.05 MG/ML
INJECTION, SOLUTION INTRAMUSCULAR; INTRAVENOUS
Status: DISPENSED
Start: 2023-09-06

## (undated) RX ORDER — FENTANYL CITRATE 50 UG/ML
INJECTION, SOLUTION INTRAMUSCULAR; INTRAVENOUS
Status: DISPENSED
Start: 2023-09-06

## (undated) RX ORDER — ONDANSETRON 2 MG/ML
INJECTION INTRAMUSCULAR; INTRAVENOUS
Status: DISPENSED
Start: 2023-08-21

## (undated) RX ORDER — GLYCOPYRROLATE 0.2 MG/ML
INJECTION INTRAMUSCULAR; INTRAVENOUS
Status: DISPENSED
Start: 2023-08-21

## (undated) RX ORDER — REGADENOSON 0.08 MG/ML
INJECTION, SOLUTION INTRAVENOUS
Status: DISPENSED
Start: 2021-05-07

## (undated) RX ORDER — FENTANYL CITRATE 50 UG/ML
INJECTION, SOLUTION INTRAMUSCULAR; INTRAVENOUS
Status: DISPENSED
Start: 2023-08-21

## (undated) RX ORDER — FENTANYL CITRATE-0.9 % NACL/PF 10 MCG/ML
PLASTIC BAG, INJECTION (ML) INTRAVENOUS
Status: DISPENSED
Start: 2023-08-21

## (undated) RX ORDER — PROPOFOL 10 MG/ML
INJECTION, EMULSION INTRAVENOUS
Status: DISPENSED
Start: 2023-08-21

## (undated) RX ORDER — FENTANYL CITRATE-0.9 % NACL/PF 10 MCG/ML
PLASTIC BAG, INJECTION (ML) INTRAVENOUS
Status: DISPENSED
Start: 2023-09-06

## (undated) RX ORDER — HYDROMORPHONE HYDROCHLORIDE 1 MG/ML
INJECTION, SOLUTION INTRAMUSCULAR; INTRAVENOUS; SUBCUTANEOUS
Status: DISPENSED
Start: 2023-09-06

## (undated) RX ORDER — DEXAMETHASONE SODIUM PHOSPHATE 4 MG/ML
INJECTION, SOLUTION INTRA-ARTICULAR; INTRALESIONAL; INTRAMUSCULAR; INTRAVENOUS; SOFT TISSUE
Status: DISPENSED
Start: 2023-08-21

## (undated) RX ORDER — ACETAMINOPHEN 325 MG/1
TABLET ORAL
Status: DISPENSED
Start: 2023-09-06

## (undated) RX ORDER — ONDANSETRON 2 MG/ML
INJECTION INTRAMUSCULAR; INTRAVENOUS
Status: DISPENSED
Start: 2023-09-06

## (undated) RX ORDER — PROPOFOL 10 MG/ML
INJECTION, EMULSION INTRAVENOUS
Status: DISPENSED
Start: 2023-09-06